# Patient Record
Sex: FEMALE | Race: WHITE | Employment: FULL TIME | URBAN - NONMETROPOLITAN AREA
[De-identification: names, ages, dates, MRNs, and addresses within clinical notes are randomized per-mention and may not be internally consistent; named-entity substitution may affect disease eponyms.]

---

## 2017-12-29 ENCOUNTER — HOSPITAL ENCOUNTER (EMERGENCY)
Age: 52
Discharge: HOME | End: 2017-12-29
Attending: FAMILY MEDICINE
Payer: COMMERCIAL

## 2017-12-29 VITALS
OXYGEN SATURATION: 98 % | DIASTOLIC BLOOD PRESSURE: 71 MMHG | SYSTOLIC BLOOD PRESSURE: 124 MMHG | HEART RATE: 81 BPM | RESPIRATION RATE: 16 BRPM | TEMPERATURE: 98.1 F

## 2017-12-29 DIAGNOSIS — S16.1XXA CERVICAL STRAIN, ACUTE, INITIAL ENCOUNTER: ICD-10-CM

## 2017-12-29 DIAGNOSIS — G43.009 MIGRAINE WITHOUT AURA AND WITHOUT STATUS MIGRAINOSUS, NOT INTRACTABLE: Primary | ICD-10-CM

## 2017-12-29 PROCEDURE — 2580000003 HC RX 258: Performed by: FAMILY MEDICINE

## 2017-12-29 PROCEDURE — 6360000002 HC RX W HCPCS: Performed by: FAMILY MEDICINE

## 2017-12-29 PROCEDURE — 96374 THER/PROPH/DIAG INJ IV PUSH: CPT

## 2017-12-29 PROCEDURE — 96375 TX/PRO/DX INJ NEW DRUG ADDON: CPT

## 2017-12-29 PROCEDURE — 96372 THER/PROPH/DIAG INJ SC/IM: CPT

## 2017-12-29 PROCEDURE — 99283 EMERGENCY DEPT VISIT LOW MDM: CPT

## 2017-12-29 RX ORDER — DIPHENHYDRAMINE HYDROCHLORIDE 50 MG/ML
25 INJECTION INTRAMUSCULAR; INTRAVENOUS ONCE
Status: COMPLETED | OUTPATIENT
Start: 2017-12-29 | End: 2017-12-29

## 2017-12-29 RX ORDER — METOCLOPRAMIDE HYDROCHLORIDE 5 MG/ML
5 INJECTION INTRAMUSCULAR; INTRAVENOUS ONCE
Status: COMPLETED | OUTPATIENT
Start: 2017-12-29 | End: 2017-12-29

## 2017-12-29 RX ORDER — HYDROCODONE BITARTRATE AND ACETAMINOPHEN 7.5; 325 MG/1; MG/1
1 TABLET ORAL EVERY 6 HOURS PRN
Qty: 8 TABLET | Refills: 0 | Status: SHIPPED | OUTPATIENT
Start: 2017-12-29 | End: 2018-01-05

## 2017-12-29 RX ORDER — METOCLOPRAMIDE HYDROCHLORIDE 5 MG/ML
10 INJECTION INTRAMUSCULAR; INTRAVENOUS ONCE
Status: DISCONTINUED | OUTPATIENT
Start: 2017-12-29 | End: 2017-12-29

## 2017-12-29 RX ORDER — 0.9 % SODIUM CHLORIDE 0.9 %
500 INTRAVENOUS SOLUTION INTRAVENOUS ONCE
Status: COMPLETED | OUTPATIENT
Start: 2017-12-29 | End: 2017-12-29

## 2017-12-29 RX ADMIN — DIPHENHYDRAMINE HYDROCHLORIDE 25 MG: 50 INJECTION, SOLUTION INTRAMUSCULAR; INTRAVENOUS at 12:29

## 2017-12-29 RX ADMIN — METOCLOPRAMIDE 5 MG: 5 INJECTION, SOLUTION INTRAMUSCULAR; INTRAVENOUS at 12:29

## 2017-12-29 RX ADMIN — HYDROMORPHONE HYDROCHLORIDE 1 MG: 1 INJECTION, SOLUTION INTRAMUSCULAR; INTRAVENOUS; SUBCUTANEOUS at 12:28

## 2017-12-29 RX ADMIN — SODIUM CHLORIDE 500 ML: 9 INJECTION, SOLUTION INTRAVENOUS at 12:34

## 2017-12-29 ASSESSMENT — ENCOUNTER SYMPTOMS
EYE DISCHARGE: 0
VOICE CHANGE: 0
DIARRHEA: 0
FACIAL SWELLING: 0
CHEST TIGHTNESS: 0
PHOTOPHOBIA: 0
EYE PAIN: 0
CONSTIPATION: 0
ABDOMINAL PAIN: 0
BACK PAIN: 0
VOMITING: 0
RHINORRHEA: 0
STRIDOR: 0
COUGH: 0
COLOR CHANGE: 0
EYE REDNESS: 0
WHEEZING: 0
SORE THROAT: 0
SHORTNESS OF BREATH: 0
NAUSEA: 0
ABDOMINAL DISTENTION: 0

## 2017-12-29 ASSESSMENT — PAIN SCALES - GENERAL
PAINLEVEL_OUTOF10: 0
PAINLEVEL_OUTOF10: 10

## 2017-12-29 ASSESSMENT — PAIN DESCRIPTION - ORIENTATION: ORIENTATION: POSTERIOR

## 2017-12-29 ASSESSMENT — PAIN DESCRIPTION - LOCATION: LOCATION: HEAD

## 2017-12-29 ASSESSMENT — PAIN DESCRIPTION - PAIN TYPE: TYPE: ACUTE PAIN

## 2017-12-29 NOTE — ED PROVIDER NOTES
not nervous/anxious. All other systems reviewed and are negative. PAST MEDICAL HISTORY    has a past medical history of Headache(784.0). SURGICAL HISTORY      has a past surgical history that includes  section; Endometrial ablation; and Carpal tunnel release (Right). CURRENT MEDICATIONS       Previous Medications    DIPHENHYDRAMINE (BENADRYL) 25 MG CAPSULE    Take 25 mg by mouth every 6 hours as needed for Itching    ESCITALOPRAM OXALATE (LEXAPRO PO)    Take by mouth    NAPROXEN (NAPROSYN) 500 MG TABLET    Take 1 tablet by mouth 2 times daily    SUMATRIPTAN (IMITREX) 5 MG/ACT NASAL SPRAY    1 spray by Nasal route daily as needed for Migraine       ALLERGIES     has No Known Allergies. FAMILY HISTORY     has no family status information on file. family history is not on file. SOCIAL HISTORY      reports that she has quit smoking. She does not have any smokeless tobacco history on file. She reports that she drinks alcohol. She reports that she does not use drugs. PHYSICAL EXAM     INITIAL VITALS:  oral temperature is 98.1 °F (36.7 °C). Her blood pressure is 124/71 and her pulse is 81. Her respiration is 16 and oxygen saturation is 98%. Physical Exam   Constitutional: She is oriented to person, place, and time. She appears well-developed and well-nourished. No distress. HENT:   Head: Normocephalic and atraumatic. Right Ear: External ear normal.   Left Ear: External ear normal.   Nose: Nose normal.   Mouth/Throat: Oropharynx is clear and moist.   Eyes: Conjunctivae and EOM are normal. Pupils are equal, round, and reactive to light. Right eye exhibits no discharge. Left eye exhibits no discharge. No scleral icterus. Neck: Normal range of motion. Neck supple. No JVD present. No tracheal deviation present. No thyromegaly present. Cardiovascular: Normal rate, regular rhythm, normal heart sounds and intact distal pulses. Exam reveals no gallop and no friction rub.     No murmur heard.  Pulmonary/Chest: Effort normal and breath sounds normal. No stridor. No respiratory distress. She has no wheezes. She has no rales. She exhibits no tenderness. Musculoskeletal: Normal range of motion. She exhibits no edema or tenderness. Lymphadenopathy:     She has no cervical adenopathy. Neurological: She is alert and oriented to person, place, and time. She has normal reflexes. No cranial nerve deficit. She exhibits normal muscle tone. Coordination normal.   No focal deficits. Skin: Skin is warm and dry. No rash (on exposed skin) noted. She is not diaphoretic. Psychiatric: She has a normal mood and affect. Her behavior is normal.   Nursing note and vitals reviewed. DIFFERENTIAL DIAGNOSIS:   Migraine, trapezius strain,    DIAGNOSTIC RESULTS     EKG: All EKG's are interpreted by the Emergency Department Physician who either signs or Co-signs this chart in the absence of a cardiologist.      RADIOLOGY: non-plain film images(s) such as CT, Ultrasound and MRI are read by the radiologist.  Plain radiographic images are visualized and preliminarily interpreted by the emergency physician unless otherwise stated below. LABS:   Labs Reviewed - No data to display    EMERGENCY DEPARTMENT COURSE(MDM): Vitals:    Vitals:    12/29/17 1209 12/29/17 1305   BP: (!) 140/83 124/71   Pulse: 88 81   Resp: 20 16   Temp: 98.1 °F (36.7 °C)    TempSrc: Oral    SpO2: 98% 98%     Patient admits to significant right-sided headache extending to her right trapezius area. Patient admits to using Imitrex prior to arrival with no improvement. Patient does admit to a history of migraines in the past however nothing within the last few months. She admitted to some mild nausea. Neurologic exam was unremarkable. This did not seem to have thunderclap equivalent symptoms. Patient was provided normal saline bolus 500 mL, Reglan 5 mg IV, Benadryl 25 mg IV, and Dilaudid 1 mg.   Patient noted significant improvement in

## 2018-02-28 ENCOUNTER — HOSPITAL ENCOUNTER (EMERGENCY)
Age: 53
Discharge: HOME | End: 2018-02-28
Attending: EMERGENCY MEDICINE
Payer: COMMERCIAL

## 2018-02-28 ENCOUNTER — APPOINTMENT (OUTPATIENT)
Dept: GENERAL RADIOLOGY | Age: 53
End: 2018-02-28
Payer: COMMERCIAL

## 2018-02-28 VITALS
RESPIRATION RATE: 16 BRPM | SYSTOLIC BLOOD PRESSURE: 133 MMHG | WEIGHT: 190 LBS | DIASTOLIC BLOOD PRESSURE: 83 MMHG | BODY MASS INDEX: 33.66 KG/M2 | HEART RATE: 102 BPM | OXYGEN SATURATION: 95 % | TEMPERATURE: 97.8 F | HEIGHT: 63 IN

## 2018-02-28 DIAGNOSIS — S93.601A SPRAIN OF RIGHT FOOT, INITIAL ENCOUNTER: Primary | ICD-10-CM

## 2018-02-28 PROCEDURE — 99283 EMERGENCY DEPT VISIT LOW MDM: CPT

## 2018-02-28 PROCEDURE — 73630 X-RAY EXAM OF FOOT: CPT

## 2018-02-28 PROCEDURE — L3260 AMBULATORY SURGICAL BOOT EAC: HCPCS

## 2018-02-28 ASSESSMENT — PAIN DESCRIPTION - PAIN TYPE: TYPE: ACUTE PAIN

## 2018-02-28 ASSESSMENT — PAIN DESCRIPTION - ORIENTATION: ORIENTATION: RIGHT

## 2018-02-28 ASSESSMENT — PAIN SCALES - GENERAL: PAINLEVEL_OUTOF10: 6

## 2018-02-28 ASSESSMENT — PAIN DESCRIPTION - DESCRIPTORS: DESCRIPTORS: BURNING;NUMBNESS;TINGLING

## 2018-02-28 ASSESSMENT — PAIN DESCRIPTION - LOCATION: LOCATION: FOOT

## 2018-02-28 NOTE — ED PROVIDER NOTES
[facility]  eMERGENCY dEPARTMENT eNCOUnter      [address] [facility]    Chief Complaint   Patient presents with    Foot Pain       HPI    Nicol Schmidt is a 50 y.o. female who presents  Above-noted complaint. Patient complains of pain and discomfort. She was walking in slippers this morning and felt pain in the bottom of the foot. She feels like her toes a little tingling now after that. She points to the area of the distal metatarsals. Denies weakness some significant tingling foreign body sensation other problems    PAST MEDICAL HISTORY    Past Medical History:   Diagnosis Date    Headache(784.0)        SURGICAL HISTORY    Past Surgical History:   Procedure Laterality Date    CARPAL TUNNEL RELEASE Right      SECTION      ENDOMETRIAL ABLATION         CURRENT MEDICATIONS    Current Outpatient Rx   Medication Sig Dispense Refill    diphenhydrAMINE (BENADRYL) 25 MG capsule Take 25 mg by mouth every 6 hours as needed for Itching      SUMAtriptan (IMITREX) 5 MG/ACT nasal spray 1 spray by Nasal route daily as needed for Migraine      Escitalopram Oxalate (LEXAPRO PO) Take by mouth         ALLERGIES    No Known Allergies    FAMILY HISTORY    History reviewed. No pertinent family history. SOCIAL HISTORY    Social History     Social History    Marital status:      Spouse name: N/A    Number of children: N/A    Years of education: N/A     Social History Main Topics    Smoking status: Current Every Day Smoker     Packs/day: 0.25    Smokeless tobacco: Never Used    Alcohol use Yes      Comment: on occasion    Drug use: No    Sexual activity: Not Asked     Other Topics Concern    None     Social History Narrative    None       REVIEW OF SYSTEMS    Positive for foot pain; no discharge redness drainage. No weakness  All systems negative except as marked.      PHYSICAL EXAM    VITAL SIGNS: /83   Pulse 102   Temp 97.8 °F (36.6 °C)   Resp 16   Ht 5' 3\"

## 2018-02-28 NOTE — ED TRIAGE NOTES
Patient presents to the ED via private auto with complaints of right foot pain. States she was walking up the steps this morning and felt a \"stinging\" pain in the base of her foot just below her toes. Denies trauma to the area.

## 2018-07-06 ENCOUNTER — HOSPITAL ENCOUNTER (EMERGENCY)
Age: 53
Discharge: HOME | End: 2018-07-06
Attending: EMERGENCY MEDICINE
Payer: COMMERCIAL

## 2018-07-06 VITALS
HEART RATE: 74 BPM | RESPIRATION RATE: 18 BRPM | SYSTOLIC BLOOD PRESSURE: 128 MMHG | OXYGEN SATURATION: 97 % | DIASTOLIC BLOOD PRESSURE: 73 MMHG | TEMPERATURE: 98.1 F

## 2018-07-06 DIAGNOSIS — K08.89 PAIN, DENTAL: Primary | ICD-10-CM

## 2018-07-06 PROCEDURE — 96372 THER/PROPH/DIAG INJ SC/IM: CPT

## 2018-07-06 PROCEDURE — 6360000002 HC RX W HCPCS: Performed by: EMERGENCY MEDICINE

## 2018-07-06 PROCEDURE — 6370000000 HC RX 637 (ALT 250 FOR IP): Performed by: EMERGENCY MEDICINE

## 2018-07-06 PROCEDURE — 99283 EMERGENCY DEPT VISIT LOW MDM: CPT

## 2018-07-06 RX ORDER — METHYLPREDNISOLONE ACETATE 80 MG/ML
80 INJECTION, SUSPENSION INTRA-ARTICULAR; INTRALESIONAL; INTRAMUSCULAR; SOFT TISSUE ONCE
Status: COMPLETED | OUTPATIENT
Start: 2018-07-06 | End: 2018-07-06

## 2018-07-06 RX ORDER — PREDNISONE 20 MG/1
TABLET ORAL
Qty: 12 TABLET | Refills: 0 | Status: SHIPPED | OUTPATIENT
Start: 2018-07-06 | End: 2018-12-14 | Stop reason: ALTCHOICE

## 2018-07-06 RX ORDER — OXYCODONE AND ACETAMINOPHEN 10; 325 MG/1; MG/1
1 TABLET ORAL EVERY 6 HOURS PRN
Qty: 12 TABLET | Refills: 0 | Status: SHIPPED | OUTPATIENT
Start: 2018-07-06 | End: 2018-07-09

## 2018-07-06 RX ORDER — PROMETHAZINE HYDROCHLORIDE 25 MG/ML
25 INJECTION, SOLUTION INTRAMUSCULAR; INTRAVENOUS ONCE
Status: COMPLETED | OUTPATIENT
Start: 2018-07-06 | End: 2018-07-06

## 2018-07-06 RX ORDER — MEPERIDINE HYDROCHLORIDE 50 MG/ML
50 INJECTION INTRAMUSCULAR; INTRAVENOUS; SUBCUTANEOUS ONCE
Status: COMPLETED | OUTPATIENT
Start: 2018-07-06 | End: 2018-07-06

## 2018-07-06 RX ORDER — HYDROCODONE BITARTRATE AND ACETAMINOPHEN 5; 325 MG/1; MG/1
1 TABLET ORAL EVERY 6 HOURS PRN
COMMUNITY
End: 2018-10-28 | Stop reason: RX

## 2018-07-06 RX ADMIN — PROMETHAZINE HYDROCHLORIDE 25 MG: 25 INJECTION INTRAMUSCULAR; INTRAVENOUS at 14:47

## 2018-07-06 RX ADMIN — LIDOCAINE HYDROCHLORIDE 15 ML: 20 SOLUTION ORAL; TOPICAL at 14:39

## 2018-07-06 RX ADMIN — MEPERIDINE HYDROCHLORIDE 50 MG: 50 INJECTION, SOLUTION INTRAMUSCULAR; INTRAVENOUS; SUBCUTANEOUS at 14:47

## 2018-07-06 RX ADMIN — METHYLPREDNISOLONE ACETATE 80 MG: 80 INJECTION, SUSPENSION INTRA-ARTICULAR; INTRALESIONAL; INTRAMUSCULAR; SOFT TISSUE at 15:38

## 2018-07-06 ASSESSMENT — PAIN DESCRIPTION - LOCATION
LOCATION: JAW

## 2018-07-06 ASSESSMENT — PAIN DESCRIPTION - ORIENTATION
ORIENTATION: LEFT;LOWER

## 2018-07-06 ASSESSMENT — PAIN SCALES - GENERAL
PAINLEVEL_OUTOF10: 10
PAINLEVEL_OUTOF10: 2
PAINLEVEL_OUTOF10: 9
PAINLEVEL_OUTOF10: 10

## 2018-07-06 NOTE — ED PROVIDER NOTES
[facility]  eMERGENCY dEPARTMENT eNCOUnter             Brady Peoples 19 COMPLAINT    Chief Complaint   Patient presents with    Dental Pain       Nurses Notes reviewed and I agree except as noted in the HPI. HPI    Angela Espino is a 50 y.o. female who presents with severe left mandibular dental pain after having removal of a cracked tooth yesterday at [facility]. The intense pain started soon after the numbing medication wore off. Her pain is  10/10, aching, constant, not relieved with Ibuprofen and Norco. She is also on Amoxicillin. Part of her left jaw \"still feels numb\", burning and stinging. She is sobbing, anxious, having trouble answering questions due to her pain. The worst pain is in the extraction site, and radiates into the adjacent mandible and submandibular area. She does not think cold application helps, and has not tried warm salt water rinses. REVIEW OF SYSTEMS      Review of Systems   Constitutional: Positive for malaise/fatigue. Negative for fever. HENT: Positive for ear pain (left side). Negative for sore throat. Gastrointestinal: Negative for abdominal pain and nausea. Musculoskeletal: Negative for neck pain. Neurological: Positive for headaches. Negative for focal weakness. Psychiatric/Behavioral: The patient is nervous/anxious. All other systems reviewed and are negative. PAST MEDICAL HISTORY     has a past medical history of Headache(784.0). SURGICAL HISTORY     has a past surgical history that includes  section; Endometrial ablation; and Carpal tunnel release (Right). CURRENT MEDICATIONS    Discharge Medication List as of 2018  3:40 PM      CONTINUE these medications which have NOT CHANGED    Details   HYDROcodone-acetaminophen (NORCO) 5-325 MG per tablet Take 1 tablet by mouth every 6 hours as needed for Pain. Amadou Sampson Historical Med      AMOXICILLIN PO Take 500 mg by mouth Historical Med

## 2018-07-06 NOTE — ED NOTES
Dr. Payton Cooper with patient and spouse discussing plan of care.      Natalio Perez RN  07/06/18 9869

## 2018-07-07 ASSESSMENT — ENCOUNTER SYMPTOMS
ABDOMINAL PAIN: 0
SORE THROAT: 0
NAUSEA: 0

## 2018-10-28 ENCOUNTER — HOSPITAL ENCOUNTER (EMERGENCY)
Age: 53
Discharge: HOME | End: 2018-10-28
Attending: FAMILY MEDICINE
Payer: COMMERCIAL

## 2018-10-28 VITALS
OXYGEN SATURATION: 95 % | HEART RATE: 70 BPM | TEMPERATURE: 96.6 F | HEIGHT: 63 IN | BODY MASS INDEX: 35.44 KG/M2 | WEIGHT: 200 LBS | RESPIRATION RATE: 16 BRPM | SYSTOLIC BLOOD PRESSURE: 141 MMHG | DIASTOLIC BLOOD PRESSURE: 89 MMHG

## 2018-10-28 DIAGNOSIS — G43.001 MIGRAINE WITHOUT AURA AND WITH STATUS MIGRAINOSUS, NOT INTRACTABLE: Primary | ICD-10-CM

## 2018-10-28 LAB
ANION GAP: 13 MEQ/L (ref 8–16)
BASOPHILS # BLD: 0.6 % (ref 0–3)
BUN BLDV-MCNC: 14 MG/DL (ref 7–18)
CHLORIDE BLD-SCNC: 103 MEQ/L (ref 98–107)
CO2: 24 MEQ/L (ref 21–32)
CREAT SERPL-MCNC: 0.7 MG/DL (ref 0.6–1.3)
EOSINOPHILS RELATIVE PERCENT: 4.1 % (ref 0–4)
GFR, ESTIMATED: > 90 ML/MIN/1.73M2
GLUCOSE BLD-MCNC: 115 MG/DL (ref 74–106)
HCT VFR BLD CALC: 45.3 % (ref 37–47)
HEMOGLOBIN: 15.7 GM/DL (ref 12–16)
LYMPHOCYTES # BLD: 36.2 % (ref 15–47)
MCH RBC QN AUTO: 32.5 PG (ref 27–31)
MCHC RBC AUTO-ENTMCNC: 34.7 GM/DL (ref 33–37)
MCV RBC AUTO: 93.5 FL (ref 81–99)
MONOCYTES: 7.1 % (ref 0–12)
PDW BLD-RTO: 13.8 % (ref 11.5–14.5)
PLATELET # BLD: 243 THOU/MM3 (ref 130–400)
PMV BLD AUTO: 8.3 FL (ref 7.4–10.4)
POC CALCIUM: 9.1 MG/DL (ref 8.5–10.1)
POTASSIUM SERPL-SCNC: 4.3 MEQ/L (ref 3.5–5.1)
RBC # BLD: 4.84 MILL/MM3 (ref 4.2–5.4)
SEGS: 52 % (ref 43–75)
SODIUM BLD-SCNC: 140 MEQ/L (ref 136–145)
WBC # BLD: 5.8 THOU/MM3 (ref 4.8–10.8)

## 2018-10-28 PROCEDURE — 96375 TX/PRO/DX INJ NEW DRUG ADDON: CPT

## 2018-10-28 PROCEDURE — 36415 COLL VENOUS BLD VENIPUNCTURE: CPT

## 2018-10-28 PROCEDURE — 2580000003 HC RX 258: Performed by: FAMILY MEDICINE

## 2018-10-28 PROCEDURE — 80048 BASIC METABOLIC PNL TOTAL CA: CPT

## 2018-10-28 PROCEDURE — 99283 EMERGENCY DEPT VISIT LOW MDM: CPT

## 2018-10-28 PROCEDURE — 2709999900 HC NON-CHARGEABLE SUPPLY

## 2018-10-28 PROCEDURE — 6360000002 HC RX W HCPCS: Performed by: FAMILY MEDICINE

## 2018-10-28 PROCEDURE — 96374 THER/PROPH/DIAG INJ IV PUSH: CPT

## 2018-10-28 PROCEDURE — 85025 COMPLETE CBC W/AUTO DIFF WBC: CPT

## 2018-10-28 RX ORDER — HYDROCODONE BITARTRATE AND ACETAMINOPHEN 5; 325 MG/1; MG/1
1 TABLET ORAL EVERY 6 HOURS PRN
Qty: 6 TABLET | Refills: 0 | Status: SHIPPED | OUTPATIENT
Start: 2018-10-28 | End: 2018-10-31

## 2018-10-28 RX ORDER — METOCLOPRAMIDE HYDROCHLORIDE 5 MG/ML
10 INJECTION INTRAMUSCULAR; INTRAVENOUS ONCE
Status: COMPLETED | OUTPATIENT
Start: 2018-10-28 | End: 2018-10-28

## 2018-10-28 RX ORDER — DIPHENHYDRAMINE HYDROCHLORIDE 50 MG/ML
25 INJECTION INTRAMUSCULAR; INTRAVENOUS ONCE
Status: COMPLETED | OUTPATIENT
Start: 2018-10-28 | End: 2018-10-28

## 2018-10-28 RX ORDER — 0.9 % SODIUM CHLORIDE 0.9 %
1000 INTRAVENOUS SOLUTION INTRAVENOUS ONCE
Status: COMPLETED | OUTPATIENT
Start: 2018-10-28 | End: 2018-10-28

## 2018-10-28 RX ADMIN — DIPHENHYDRAMINE HYDROCHLORIDE 25 MG: 50 INJECTION, SOLUTION INTRAMUSCULAR; INTRAVENOUS at 09:37

## 2018-10-28 RX ADMIN — SODIUM CHLORIDE 1000 ML: 9 INJECTION, SOLUTION INTRAVENOUS at 09:35

## 2018-10-28 RX ADMIN — HYDROMORPHONE HYDROCHLORIDE 1 MG: 1 INJECTION, SOLUTION INTRAMUSCULAR; INTRAVENOUS; SUBCUTANEOUS at 09:36

## 2018-10-28 RX ADMIN — METOCLOPRAMIDE 10 MG: 5 INJECTION, SOLUTION INTRAMUSCULAR; INTRAVENOUS at 09:36

## 2018-10-28 ASSESSMENT — ENCOUNTER SYMPTOMS
CONSTIPATION: 0
COUGH: 0
EYE PAIN: 0
STRIDOR: 0
WHEEZING: 0
VOMITING: 0
DIARRHEA: 0
COLOR CHANGE: 0
CHEST TIGHTNESS: 0
RHINORRHEA: 0
ABDOMINAL PAIN: 0
VOICE CHANGE: 0
PHOTOPHOBIA: 0
NAUSEA: 0
BACK PAIN: 0
EYE DISCHARGE: 0
FACIAL SWELLING: 0
ABDOMINAL DISTENTION: 0
SHORTNESS OF BREATH: 0
EYE REDNESS: 0
SORE THROAT: 0

## 2018-10-28 ASSESSMENT — PAIN SCALES - GENERAL
PAINLEVEL_OUTOF10: 0
PAINLEVEL_OUTOF10: 10

## 2018-10-28 NOTE — PROGRESS NOTES
AVS rev'd with pt. And copy given, pt. Given Rx for norco, verbalizes understanding, pt. delines w/c. adivised pt. To go home and rest remainder of day and not to operate or drive heavy machinery, pt. verabalizes understanding. Of poc. Pulse regular. Extremities warm. Respirations regular and quiet. Mucous membranes pink & moist. Alert and oriented times 3. No nausea or vomiting. Range of motion within patient's limits. Skin pink, warm and dry. Calm and cooperative.

## 2018-10-28 NOTE — PROGRESS NOTES
Pt. Presents ambulatory to [facility]  With c/o migraine h/a woke her up around 8:00 am, c/o nausea, pt. Voices unable to sit down, pt. Tearful and anxious, pt. Upset with nurse trying to get medical history, pt. Taken to exam 1 , lights dimmed,emesis bag given.

## 2018-10-28 NOTE — ED PROVIDER NOTES
Endometrial ablation; and Carpal tunnel release (Right). CURRENT MEDICATIONS     Discharge Medication List as of 10/28/2018 10:28 AM      CONTINUE these medications which have NOT CHANGED    Details   AMOXICILLIN PO Take 500 mg by mouth Historical Med      predniSONE (DELTASONE) 20 MG tablet 3 daily for 2 days, then 2 daily for 2 days,then 1 daily for 2 days for inflammation. , Disp-12 tablet, R-0Print      diphenhydrAMINE (BENADRYL) 25 MG capsule Take 25 mg by mouth every 6 hours as needed for ItchingHistorical Med      SUMAtriptan (IMITREX) 5 MG/ACT nasal spray 1 spray by Nasal route daily as needed for Migraine      Escitalopram Oxalate (LEXAPRO PO) Take by mouth             ALLERGIES     has No Known Allergies. FAMILY HISTORY     has no family status information on file. family history is not on file. SOCIAL HISTORY      reports that she has been smoking. She has been smoking about 0.25 packs per day. She has never used smokeless tobacco. She reports that she drinks alcohol. She reports that she does not use drugs. PHYSICAL EXAM     INITIAL VITALS:  height is 5' 3\" (1.6 m) and weight is 200 lb (90.7 kg). Her temporal temperature is 96.6 °F (35.9 °C). Her blood pressure is 141/89 (abnormal) and her pulse is 70. Her respiration is 16 and oxygen saturation is 95%. Physical Exam   Constitutional: She appears well-developed and well-nourished. She appears distressed. HENT:   Head: Normocephalic and atraumatic. Eyes: Pupils are equal, round, and reactive to light. Conjunctivae and EOM are normal.   Neck: Normal range of motion. Neck supple. Skin: Skin is warm and dry. Psychiatric: Her mood appears anxious. She is agitated. Nursing note and vitals reviewed.       DIFFERENTIAL DIAGNOSIS:   Migraine, SAH,    DIAGNOSTIC RESULTS     EKG: All EKG's are interpreted by the Emergency Department Physician who either signs or Co-signs this chart in the absence of a cardiologist.      RADIOLOGY: for Pain for up to 3 days. Intended supply: 3 days.  Take lowest dose possible to manage pain., Disp-6 tablet, R-0Print             (Please note that portionsof this note were completed with a voice recognition program.  Efforts were made to edit the dictations but occasionally words are mis-transcribed.)    MD Khanh Bojorquez MD  10/28/18 7179

## 2018-12-14 ENCOUNTER — HOSPITAL ENCOUNTER (EMERGENCY)
Age: 53
Discharge: HOME | End: 2018-12-14
Attending: EMERGENCY MEDICINE
Payer: COMMERCIAL

## 2018-12-14 VITALS
OXYGEN SATURATION: 94 % | HEART RATE: 82 BPM | RESPIRATION RATE: 16 BRPM | TEMPERATURE: 98.8 F | BODY MASS INDEX: 31.89 KG/M2 | SYSTOLIC BLOOD PRESSURE: 124 MMHG | WEIGHT: 180 LBS | DIASTOLIC BLOOD PRESSURE: 59 MMHG

## 2018-12-14 DIAGNOSIS — J30.2 SEASONAL ALLERGIES: ICD-10-CM

## 2018-12-14 DIAGNOSIS — J06.9 ACUTE UPPER RESPIRATORY INFECTION: Primary | ICD-10-CM

## 2018-12-14 PROCEDURE — 99283 EMERGENCY DEPT VISIT LOW MDM: CPT

## 2018-12-14 PROCEDURE — 6360000002 HC RX W HCPCS: Performed by: EMERGENCY MEDICINE

## 2018-12-14 PROCEDURE — 96372 THER/PROPH/DIAG INJ SC/IM: CPT

## 2018-12-14 RX ORDER — PREDNISONE 10 MG/1
TABLET ORAL
Qty: 30 TABLET | Refills: 0 | Status: SHIPPED | OUTPATIENT
Start: 2018-12-14 | End: 2019-03-26 | Stop reason: ALTCHOICE

## 2018-12-14 RX ORDER — METHYLPREDNISOLONE SODIUM SUCCINATE 125 MG/2ML
80 INJECTION, POWDER, LYOPHILIZED, FOR SOLUTION INTRAMUSCULAR; INTRAVENOUS ONCE
Status: COMPLETED | OUTPATIENT
Start: 2018-12-14 | End: 2018-12-14

## 2018-12-14 RX ADMIN — METHYLPREDNISOLONE SODIUM SUCCINATE 80 MG: 125 INJECTION, POWDER, FOR SOLUTION INTRAMUSCULAR; INTRAVENOUS at 06:33

## 2018-12-14 NOTE — ED PROVIDER NOTES
activity: Not Asked     Other Topics Concern    None     Social History Narrative    None       REVIEW OF SYSTEMS    Positive for rhinorrhea and stuffiness. Negative for neck pain chest pain terms of breath focal weakness or vomiting. All systems negative except as marked. PHYSICAL EXAM    VITAL SIGNS: BP (!) 124/59   Pulse 82   Temp 98.8 °F (37.1 °C) (Temporal)   Resp 16   Wt 180 lb (81.6 kg)   SpO2 94%   BMI 31.89 kg/m²    Constitutional:  Alert not toxtic or ill, able to give coherent history  HENT:  Normocephalic, Atraumatic, TMs are normal, oropharynx normal.  Some maxillary sinus tenderness. Cervical Spine: Normal range of motion,  No stridor. Eyes:  No discharge or  Swelling,perrla  Respiratory: No respiratory distress, clear to auscultation  Musculoskeletal:  Intact distal pulses, No edema, No tenderness, No cyanosis, No clubbing. Good range of motion in all major joints. No tenderness to palpation or major deformities noted. Integument:  Warm, Dry, No erythema, No rash (on exposed areas)   Neurologic:  Alert & appropriate, moving all fours   Psychiatric:  Affect normal                    RADIOLOGY    No orders to display       PROCEDURES    none      CONSULTS:  None        ED COURSE & MEDICAL DECISION MAKING    Pertinent Labs & Imaging studies reviewed. (See chart for details)  She presents with URI and sinusitis type symptoms. Neurological exam is otherwise stable. Nothing else to suggest stroke mass or bleeding or other issues. X-rays allergic component. Give her dose and some Solu-Medrol Place her on some prednisone taper. She is artery on appropriate other medicines. Continue Flonase.       SCREENINGS  BP (!) 124/59   Pulse 82   Temp 98.8 °F (37.1 °C) (Temporal)   Resp 16   Wt 180 lb (81.6 kg)   SpO2 94%   BMI 31.89 kg/m²      No orders to display       Screening For Hypertension and Follow-up (#317)  patient informed that blood pressure is normal but should always be

## 2019-03-26 ENCOUNTER — HOSPITAL ENCOUNTER (EMERGENCY)
Age: 54
Discharge: HOME | End: 2019-03-26
Attending: EMERGENCY MEDICINE
Payer: COMMERCIAL

## 2019-03-26 VITALS
HEART RATE: 75 BPM | BODY MASS INDEX: 31.89 KG/M2 | HEIGHT: 63 IN | RESPIRATION RATE: 16 BRPM | SYSTOLIC BLOOD PRESSURE: 123 MMHG | DIASTOLIC BLOOD PRESSURE: 70 MMHG | WEIGHT: 180 LBS | OXYGEN SATURATION: 96 % | TEMPERATURE: 98.7 F

## 2019-03-26 DIAGNOSIS — H66.91 ACUTE RIGHT OTITIS MEDIA: Primary | ICD-10-CM

## 2019-03-26 PROCEDURE — 6370000000 HC RX 637 (ALT 250 FOR IP): Performed by: EMERGENCY MEDICINE

## 2019-03-26 PROCEDURE — 99283 EMERGENCY DEPT VISIT LOW MDM: CPT

## 2019-03-26 RX ORDER — AMOXICILLIN 500 MG/1
1000 CAPSULE ORAL 2 TIMES DAILY
Qty: 40 CAPSULE | Refills: 0 | Status: SHIPPED | OUTPATIENT
Start: 2019-03-26 | End: 2019-04-05

## 2019-03-26 RX ORDER — LISINOPRIL 10 MG/1
10 TABLET ORAL DAILY
COMMUNITY
End: 2019-12-26

## 2019-03-26 RX ORDER — ONDANSETRON 4 MG/1
4 TABLET, ORALLY DISINTEGRATING ORAL EVERY 8 HOURS PRN
Qty: 6 TABLET | Refills: 0 | Status: SHIPPED | OUTPATIENT
Start: 2019-03-26 | End: 2019-12-26

## 2019-03-26 RX ORDER — IBUPROFEN 200 MG
600 TABLET ORAL ONCE
Status: COMPLETED | OUTPATIENT
Start: 2019-03-26 | End: 2019-03-26

## 2019-03-26 RX ORDER — HYDROCODONE BITARTRATE AND ACETAMINOPHEN 5; 325 MG/1; MG/1
1 TABLET ORAL ONCE
Status: COMPLETED | OUTPATIENT
Start: 2019-03-26 | End: 2019-03-26

## 2019-03-26 RX ORDER — HYDROCODONE BITARTRATE AND ACETAMINOPHEN 5; 325 MG/1; MG/1
1 TABLET ORAL EVERY 6 HOURS PRN
Qty: 12 TABLET | Refills: 0 | Status: SHIPPED | OUTPATIENT
Start: 2019-03-26 | End: 2019-03-29

## 2019-03-26 RX ORDER — ONDANSETRON 4 MG/1
4 TABLET, ORALLY DISINTEGRATING ORAL ONCE
Status: COMPLETED | OUTPATIENT
Start: 2019-03-26 | End: 2019-03-26

## 2019-03-26 RX ORDER — AMOXICILLIN 250 MG/1
250 CAPSULE ORAL ONCE
Status: COMPLETED | OUTPATIENT
Start: 2019-03-26 | End: 2019-03-26

## 2019-03-26 RX ADMIN — HYDROCODONE BITARTRATE AND ACETAMINOPHEN 1 TABLET: 5; 325 TABLET ORAL at 04:00

## 2019-03-26 RX ADMIN — AMOXICILLIN 250 MG: 250 CAPSULE ORAL at 17:58

## 2019-03-26 RX ADMIN — IBUPROFEN 600 MG: 200 TABLET, FILM COATED ORAL at 17:59

## 2019-03-26 RX ADMIN — ONDANSETRON 4 MG: 4 TABLET, ORALLY DISINTEGRATING ORAL at 17:33

## 2019-03-26 ASSESSMENT — PAIN SCALES - GENERAL
PAINLEVEL_OUTOF10: 4
PAINLEVEL_OUTOF10: 7

## 2019-03-26 ASSESSMENT — ENCOUNTER SYMPTOMS
SHORTNESS OF BREATH: 0
COUGH: 1
SORE THROAT: 1
NAUSEA: 0
SINUS PRESSURE: 1
ABDOMINAL PAIN: 0
WHEEZING: 0
RHINORRHEA: 1

## 2019-03-26 ASSESSMENT — PAIN DESCRIPTION - LOCATION: LOCATION: EAR

## 2019-03-26 ASSESSMENT — PAIN DESCRIPTION - ORIENTATION: ORIENTATION: RIGHT

## 2019-05-06 ENCOUNTER — HOSPITAL ENCOUNTER (OUTPATIENT)
Dept: GENERAL RADIOLOGY | Age: 54
Discharge: HOME | End: 2019-05-06
Payer: COMMERCIAL

## 2019-05-06 ENCOUNTER — HOSPITAL ENCOUNTER (OUTPATIENT)
Age: 54
Discharge: HOME | End: 2019-05-06
Payer: COMMERCIAL

## 2019-05-06 DIAGNOSIS — R52 PAIN: ICD-10-CM

## 2019-05-06 PROCEDURE — 73630 X-RAY EXAM OF FOOT: CPT

## 2019-05-06 PROCEDURE — 73610 X-RAY EXAM OF ANKLE: CPT

## 2019-05-16 ENCOUNTER — HOSPITAL ENCOUNTER (OUTPATIENT)
Dept: MRI IMAGING | Age: 54
Discharge: HOME | End: 2019-05-16
Payer: COMMERCIAL

## 2019-05-16 DIAGNOSIS — M25.571 RIGHT ANKLE PAIN, UNSPECIFIED CHRONICITY: ICD-10-CM

## 2019-05-16 PROCEDURE — 73721 MRI JNT OF LWR EXTRE W/O DYE: CPT

## 2019-05-24 ENCOUNTER — HOSPITAL ENCOUNTER (EMERGENCY)
Age: 54
Discharge: HOME | End: 2019-05-24
Attending: EMERGENCY MEDICINE
Payer: COMMERCIAL

## 2019-05-24 VITALS
TEMPERATURE: 98.7 F | OXYGEN SATURATION: 97 % | HEART RATE: 73 BPM | DIASTOLIC BLOOD PRESSURE: 90 MMHG | RESPIRATION RATE: 18 BRPM | SYSTOLIC BLOOD PRESSURE: 148 MMHG

## 2019-05-24 DIAGNOSIS — M72.2 PLANTAR FASCIITIS, RIGHT: ICD-10-CM

## 2019-05-24 DIAGNOSIS — M25.571 ACUTE RIGHT ANKLE PAIN: Primary | ICD-10-CM

## 2019-05-24 PROCEDURE — 99283 EMERGENCY DEPT VISIT LOW MDM: CPT

## 2019-05-24 PROCEDURE — 2709999900 HC NON-CHARGEABLE SUPPLY

## 2019-05-24 ASSESSMENT — ENCOUNTER SYMPTOMS: SHORTNESS OF BREATH: 0

## 2019-05-24 ASSESSMENT — PAIN SCALES - GENERAL
PAINLEVEL_OUTOF10: 4
PAINLEVEL_OUTOF10: 7

## 2019-05-24 ASSESSMENT — PAIN DESCRIPTION - LOCATION
LOCATION: ANKLE
LOCATION: ANKLE;FOOT

## 2019-05-24 ASSESSMENT — PAIN DESCRIPTION - ORIENTATION
ORIENTATION: RIGHT;OUTER
ORIENTATION: LEFT

## 2019-05-24 NOTE — ED NOTES
Discharge instructions reviewed with patient and spouse,  questions answered. Skin warm and dry, color normal for ethnicity. Remains alert and cooperative. Denies further questions or concerns at this time.      Ella Solomon RN  05/24/19 9112

## 2019-05-24 NOTE — DISCHARGE INSTR - COC
Continuity of Care Form    Patient Name: Justina Murillo   :  1965  MRN:  [id number]    Admit date:  2019  Discharge date:  ***    Code Status Order: No Order   Advance Directives:     Admitting Physician:  No admitting provider for patient encounter. PCP: Jacek Gates MD    Discharging Nurse: [facility] Unit/Room#: E5/E5  Discharging Unit Phone Number: ***    Emergency Contact:   Extended Emergency Contact Information  Primary Emergency Contact: Lion Pino [address] [place] [address] Phone: [phone]  Relation: Other  Secondary Emergency Contact: Ambikakt Lindy  Address: [place] [address].           Atul Mondragon [address] [place] [address] Phone: [phone]  Relation: Spouse    Past Surgical History:  Past Surgical History:   Procedure Laterality Date    CARPAL TUNNEL RELEASE Right      SECTION      ENDOMETRIAL ABLATION         Immunization History:   Immunization History   Administered Date(s) Administered    Tdap (Boostrix, Adacel) 2013       Active Problems: There is no problem list on file for this patient.       Isolation/Infection:   Isolation          No Isolation            Nurse Assessment:  Last Vital Signs: BP (!) 148/90   Pulse 73   Temp 98.7 °F (37.1 °C) (Oral)   Resp 18   SpO2 97%     Last documented pain score (0-10 scale): Pain Level: 4  Last Weight:   Wt Readings from Last 1 Encounters:   19 180 lb (81.6 kg)     Mental Status:  {IP PT MENTAL STATUS:43871}    IV Access:  { PRUDENCE IV ACCESS:517739307}    Nursing Mobility/ADLs:  Walking   {CHP DME IBDM:343272534}  Transfer  {CHP DME ZVXU:691571732}  Bathing  {CHP DME FJFF:353964293}  Dressing  {CHP DME JDLN:140413462}  Toileting  {CHP DME VPZV:832591502}  Feeding  {CHP DME OTDO:139767152}  Med Admin  {CHP DME IRHA:684867856}  Med Delivery   { PRUDENCE MED Delivery:975685855}    Wound Care Documentation and Therapy: Elimination:  Continence:   · Bowel: {YES / DV:74324}  · Bladder: {YES / SQ:72144}  Urinary Catheter: {Urinary Catheter:155216999}   Colostomy/Ileostomy/Ileal Conduit: {YES / JL:04911}       Date of Last BM: ***  No intake or output data in the 24 hours ending 19 1031  No intake/output data recorded.     Safety Concerns:     [facility] Safety Concerns:272133495}    Impairments/Disabilities:      [facility] Impairments/Disabilities:156054716}    Nutrition Therapy:  Current Nutrition Therapy:   [facility] Diet List:499089472}    Routes of Feeding: {CHP DME Other Feedings:840647874}  Liquids: {Slp liquid thickness:88944}  Daily Fluid Restriction: {CHP DME Yes amt example:352348150}  Last Modified Barium Swallow with Video (Video Swallowing Test): {Done Not Done EWSN:941540433}    Treatments at the Time of Hospital Discharge:   Respiratory Treatments: ***  Oxygen Therapy:  {Therapy; copd oxygen:87164}  Ventilator:    {[facility] Vent KVUL:353902634}    Rehab Therapies: {THERAPEUTIC INTERVENTION:9748089081}  Weight Bearing Status/Restrictions: [address] [place] [address] Weight Bearin}  Other Medical Equipment (for information only, NOT a DME order):  {EQUIPMENT:830107387}  Other Treatments: ***    Patient's personal belongings (please select all that are sent with patient):  {[facility] DME Belongings:562403431}    RN SIGNATURE:  {Esignature:378538963}    CASE MANAGEMENT/SOCIAL WORK SECTION    Inpatient Status Date: ***    Readmission Risk Assessment Score:  Readmission Risk              Risk of Unplanned Readmission:        0           Discharging to Facility/ Agency   · Name:   · Address:  · Phone:  · Fax:    Dialysis Facility (if applicable)   · Name:  · Address:  · Dialysis Schedule:  · Phone:  · Fax:    / signature: {Esignature:329126363}    PHYSICIAN SECTION    Prognosis: {Prognosis:2109126099}    Condition at Discharge: [facility] Patient Condition:010605672}    Rehab Potential (if transferring to Rehab):

## 2019-05-24 NOTE — ED PROVIDER NOTES
Jola Goltz   [address]  Phone: [phone]    eMERGENCY dEPARTMENT eNCOUnter           [address] [facility]       Chief Complaint   Patient presents with    Ankle Pain     right       Nurses Notes reviewed and I agree except as noted in the HPI. HISTORY OF PRESENT ILLNESS    Grace Pizarro is a 48 y.o. female who presented via private vehicle with the  chief complaint mentioned above. She presented with one-month history of right foot and ankle pain. She denies injury or fall. She said that her right foot developed on her today at work. He did not fall. She describes her pain as mild sharp pain involving the anterior aspect of the right and conjoint and the plantar aspect of the right foot. Pain is worse with walking. She denies weakness or numbness. She denies fever or chills. She had MRI of the right foot 8 days ago, which showed the bone contusion and    REVIEW OF SYSTEMS     Review of Systems   Constitutional: Negative for fever. Respiratory: Negative for shortness of breath. Cardiovascular: Negative for chest pain. PAST MEDICAL HISTORY    has a past medical history of Headache(784.0). SURGICAL HISTORY      has a past surgical history that includes  section; Endometrial ablation; and Carpal tunnel release (Right).     CURRENT MEDICATIONS       Previous Medications    ASPIRIN 500 MG TABLET    Take 1,000 mg by mouth every 6 hours as needed for Pain caffeine also in product    DIPHENHYDRAMINE (BENADRYL) 25 MG CAPSULE    Take 25 mg by mouth every 6 hours as needed for Itching    ESCITALOPRAM OXALATE (LEXAPRO PO)    Take by mouth    IBUPROFEN PO    Take by mouth    LISINOPRIL (ZESTRIL) 10 MG TABLET    Take 10 mg by mouth daily    ONDANSETRON (ZOFRAN ODT) 4 MG DISINTEGRATING TABLET    Take 1 tablet by mouth every 8 hours as needed for Nausea or Vomiting    SUMATRIPTAN (IMITREX) 5 MG/ACT NASAL SPRAY    1 spray by Nasal route daily as needed for Migraine       ALLERGIES     has No Known Allergies. FAMILY HISTORY     has no family status information on file. family history is not on file. SOCIAL HISTORY      reports that she has been smoking. She has been smoking about 0.25 packs per day. She has never used smokeless tobacco. She reports that she drinks alcohol. She reports that she does not use drugs. PHYSICAL EXAM     INITIAL VITALS:  oral temperature is 98.7 °F (37.1 °C). Her blood pressure is 148/90 (abnormal) and her pulse is 73. Her respiration is 18 and oxygen saturation is 97%. Physical Exam   Constitutional: She appears well-developed and well-nourished. No distress. HENT:   Head: Atraumatic. Musculoskeletal:   Examination of the right foot and ankle showed no swelling or obvious injury. Temperature is normal to touch. There is no joint effusion. Ligaments are stable. Active and ge of motion didn't did not induce pain. There is tenderness to palpation of the proximal plantar area. Is normal.  Neurovascular examination of the right foot and ankle. Neurological: She is alert. DIAGNOSTIC RESULTS       LABS:   Labs Reviewed - No data to display    EMERGENCY DEPARTMENT COURSE:   Vitals:    Vitals:    05/24/19 0948   BP: (!) 148/90   Pulse: 73   Resp: 18   Temp: 98.7 °F (37.1 °C)   TempSrc: Oral   SpO2: 97%         FINAL IMPRESSION      1. Acute right ankle pain    2. Plantar fasciitis, right          DISPOSITION/PLAN   Discharge home in stable condition, she was given discharge instructions and was advised to return of force or new symptoms.     PATIENT REFERRED TO:  Mariaa Oliveros DPM  [address] [phone]    In 2 days        DISCHARGE MEDICATIONS:  New Prescriptions    No medications on file       (Please note that portions of this note were completed with a voice recognition program.  Efforts were made to edit the dictations but occasionally words are mis-transcribed.)    MD Noemi Bella MD  05/24/19 7812

## 2019-05-24 NOTE — ED NOTES
Flare up of right ankle pain that she has had for several months. No new injury noted. Has had xrays and MRI completed. Pain worse with walking. Tried to work today but pain worsened today. Neuro/circ status intact. Limited range of motion noted due to pain.      Lakhwinder Aguillon RN  05/24/19 4309

## 2019-05-29 ENCOUNTER — HOSPITAL ENCOUNTER (OUTPATIENT)
Age: 54
Discharge: HOME | End: 2019-05-29
Payer: COMMERCIAL

## 2019-05-29 LAB
ERYTHROCYTE [DISTWIDTH] IN BLOOD BY AUTOMATED COUNT: 13.5 % (ref 11.5–14.5)
ERYTHROCYTE [DISTWIDTH] IN BLOOD BY AUTOMATED COUNT: 46.3 FL (ref 35–45)
HCT VFR BLD CALC: 45.3 % (ref 37–47)
HEMOGLOBIN: 15 GM/DL (ref 12–16)
MCH RBC QN AUTO: 30.9 PG (ref 26–33)
MCHC RBC AUTO-ENTMCNC: 33.1 GM/DL (ref 32.2–35.5)
MCV RBC AUTO: 93.4 FL (ref 81–99)
PLATELET # BLD: 290 THOU/MM3 (ref 130–400)
PMV BLD AUTO: 11.4 FL (ref 9.4–12.4)
RBC # BLD: 4.85 MILL/MM3 (ref 4.2–5.4)
RHEUMATOID FACTOR: < 10 IU/ML (ref 0–13)
SEDIMENTATION RATE, ERYTHROCYTE: 4 MM/HR (ref 0–20)
TSH SERPL DL<=0.05 MIU/L-ACNC: 1.16 UIU/ML (ref 0.4–4.2)
URIC ACID: 3.6 MG/DL (ref 2.4–5.7)
VITAMIN D 25-HYDROXY: 27 NG/ML (ref 30–100)
WBC # BLD: 9.3 THOU/MM3 (ref 4.8–10.8)

## 2019-05-29 PROCEDURE — 84443 ASSAY THYROID STIM HORMONE: CPT

## 2019-05-29 PROCEDURE — 84550 ASSAY OF BLOOD/URIC ACID: CPT

## 2019-05-29 PROCEDURE — 36415 COLL VENOUS BLD VENIPUNCTURE: CPT

## 2019-05-29 PROCEDURE — 85027 COMPLETE CBC AUTOMATED: CPT

## 2019-05-29 PROCEDURE — 86038 ANTINUCLEAR ANTIBODIES: CPT

## 2019-05-29 PROCEDURE — 86431 RHEUMATOID FACTOR QUANT: CPT

## 2019-05-29 PROCEDURE — 86200 CCP ANTIBODY: CPT

## 2019-05-29 PROCEDURE — 85651 RBC SED RATE NONAUTOMATED: CPT

## 2019-05-29 PROCEDURE — 86039 ANTINUCLEAR ANTIBODIES (ANA): CPT

## 2019-05-29 PROCEDURE — 82306 VITAMIN D 25 HYDROXY: CPT

## 2019-06-01 LAB
ANA SCREEN: DETECTED
CYCLIC CITRULLIN PEPTIDE AB: 2 UNITS (ref 0–19)

## 2019-06-02 LAB
ANA PATTERN 2: ABNORMAL
ANA PATTERN: ABNORMAL
ANA TITER 2: ABNORMAL
ANA TITER: ABNORMAL
ANTINUCLEAR AB INTERPRETIVE COMMENT: ABNORMAL
ANTINUCLEAR ANTIBODY, HEP-2, IGG: DETECTED
CYTOPLASMIC PATTERN TITER: ABNORMAL

## 2019-07-22 ENCOUNTER — HOSPITAL ENCOUNTER (EMERGENCY)
Age: 54
Discharge: HOME | End: 2019-07-22
Attending: EMERGENCY MEDICINE
Payer: COMMERCIAL

## 2019-07-22 ENCOUNTER — APPOINTMENT (OUTPATIENT)
Dept: CT IMAGING | Age: 54
End: 2019-07-22
Payer: COMMERCIAL

## 2019-07-22 VITALS
SYSTOLIC BLOOD PRESSURE: 119 MMHG | HEART RATE: 77 BPM | DIASTOLIC BLOOD PRESSURE: 70 MMHG | RESPIRATION RATE: 16 BRPM | OXYGEN SATURATION: 95 % | BODY MASS INDEX: 35.44 KG/M2 | WEIGHT: 200 LBS | TEMPERATURE: 98.2 F | HEIGHT: 63 IN

## 2019-07-22 DIAGNOSIS — G43.009 MIGRAINE WITHOUT AURA AND WITHOUT STATUS MIGRAINOSUS, NOT INTRACTABLE: Primary | ICD-10-CM

## 2019-07-22 LAB
ALBUMIN SERPL-MCNC: 4 GM/DL (ref 3.4–5)
ALP BLD-CCNC: 59 U/L (ref 46–116)
ALT SERPL-CCNC: 30 U/L (ref 14–63)
ANION GAP: 10 MEQ/L (ref 8–16)
AST SERPL-CCNC: 14 U/L (ref 15–37)
BASOPHILS # BLD: 0.4 % (ref 0–3)
BILIRUB SERPL-MCNC: 0.4 MG/DL (ref 0.2–1)
BUN BLDV-MCNC: 17 MG/DL (ref 7–18)
CHLORIDE BLD-SCNC: 103 MEQ/L (ref 98–107)
CO2: 29 MEQ/L (ref 21–32)
CREAT SERPL-MCNC: 1 MG/DL (ref 0.6–1.3)
EOSINOPHILS RELATIVE PERCENT: 2.3 % (ref 0–4)
GFR, ESTIMATED: 62 ML/MIN/1.73M2
GLUCOSE BLD-MCNC: 149 MG/DL (ref 74–106)
HCT VFR BLD CALC: 39.9 % (ref 37–47)
HEMOGLOBIN: 13.8 GM/DL (ref 12–16)
LYMPHOCYTES # BLD: 14.2 % (ref 15–47)
MCH RBC QN AUTO: 32.1 PG (ref 27–31)
MCHC RBC AUTO-ENTMCNC: 34.6 GM/DL (ref 33–37)
MCV RBC AUTO: 92.6 FL (ref 81–99)
MONOCYTES: 6 % (ref 0–12)
PDW BLD-RTO: 12.4 % (ref 11.5–14.5)
PLATELET # BLD: 215 THOU/MM3 (ref 130–400)
PMV BLD AUTO: 8.1 FL (ref 7.4–10.4)
POC CALCIUM: 9.4 MG/DL (ref 8.5–10.1)
POTASSIUM SERPL-SCNC: 3.7 MEQ/L (ref 3.5–5.1)
RBC # BLD: 4.31 MILL/MM3 (ref 4.2–5.4)
SEGS: 77.1 % (ref 43–75)
SODIUM BLD-SCNC: 142 MEQ/L (ref 136–145)
TOTAL PROTEIN: 7 GM/DL (ref 6.4–8.2)
WBC # BLD: 7.4 THOU/MM3 (ref 4.8–10.8)

## 2019-07-22 PROCEDURE — 96375 TX/PRO/DX INJ NEW DRUG ADDON: CPT

## 2019-07-22 PROCEDURE — 80053 COMPREHEN METABOLIC PANEL: CPT

## 2019-07-22 PROCEDURE — 99284 EMERGENCY DEPT VISIT MOD MDM: CPT

## 2019-07-22 PROCEDURE — 96374 THER/PROPH/DIAG INJ IV PUSH: CPT

## 2019-07-22 PROCEDURE — 85025 COMPLETE CBC W/AUTO DIFF WBC: CPT

## 2019-07-22 PROCEDURE — 6360000002 HC RX W HCPCS: Performed by: EMERGENCY MEDICINE

## 2019-07-22 PROCEDURE — 70450 CT HEAD/BRAIN W/O DYE: CPT

## 2019-07-22 PROCEDURE — 36415 COLL VENOUS BLD VENIPUNCTURE: CPT

## 2019-07-22 PROCEDURE — 2709999900 HC NON-CHARGEABLE SUPPLY

## 2019-07-22 RX ORDER — PROMETHAZINE HYDROCHLORIDE 25 MG/1
25 TABLET ORAL EVERY 6 HOURS PRN
Qty: 12 TABLET | Refills: 0 | Status: SHIPPED | OUTPATIENT
Start: 2019-07-22 | End: 2019-07-25

## 2019-07-22 RX ORDER — HYDROCODONE BITARTRATE AND ACETAMINOPHEN 5; 325 MG/1; MG/1
1 TABLET ORAL EVERY 6 HOURS PRN
Qty: 8 TABLET | Refills: 0 | Status: SHIPPED | OUTPATIENT
Start: 2019-07-22 | End: 2019-07-24

## 2019-07-22 RX ORDER — DIPHENHYDRAMINE HYDROCHLORIDE 50 MG/ML
25 INJECTION INTRAMUSCULAR; INTRAVENOUS ONCE
Status: COMPLETED | OUTPATIENT
Start: 2019-07-22 | End: 2019-07-22

## 2019-07-22 RX ORDER — METOCLOPRAMIDE HYDROCHLORIDE 5 MG/ML
10 INJECTION INTRAMUSCULAR; INTRAVENOUS ONCE
Status: COMPLETED | OUTPATIENT
Start: 2019-07-22 | End: 2019-07-22

## 2019-07-22 RX ORDER — METHYLPREDNISOLONE SODIUM SUCCINATE 125 MG/2ML
125 INJECTION, POWDER, LYOPHILIZED, FOR SOLUTION INTRAMUSCULAR; INTRAVENOUS ONCE
Status: COMPLETED | OUTPATIENT
Start: 2019-07-22 | End: 2019-07-22

## 2019-07-22 RX ADMIN — HYDROMORPHONE HYDROCHLORIDE 0.5 MG: 1 INJECTION, SOLUTION INTRAMUSCULAR; INTRAVENOUS; SUBCUTANEOUS at 13:20

## 2019-07-22 RX ADMIN — DIPHENHYDRAMINE HYDROCHLORIDE 25 MG: 50 INJECTION, SOLUTION INTRAMUSCULAR; INTRAVENOUS at 13:20

## 2019-07-22 RX ADMIN — METOCLOPRAMIDE 10 MG: 5 INJECTION, SOLUTION INTRAMUSCULAR; INTRAVENOUS at 13:21

## 2019-07-22 RX ADMIN — METHYLPREDNISOLONE SODIUM SUCCINATE 125 MG: 125 INJECTION, POWDER, FOR SOLUTION INTRAMUSCULAR; INTRAVENOUS at 14:41

## 2019-07-22 ASSESSMENT — ENCOUNTER SYMPTOMS
BLURRED VISION: 0
SHORTNESS OF BREATH: 0
NAUSEA: 1
PHOTOPHOBIA: 1
ABDOMINAL PAIN: 0
BACK PAIN: 0
VOMITING: 1
SORE THROAT: 0

## 2019-07-22 ASSESSMENT — PAIN DESCRIPTION - LOCATION
LOCATION: HEAD
LOCATION: HEAD

## 2019-07-22 ASSESSMENT — PAIN SCALES - GENERAL
PAINLEVEL_OUTOF10: 9
PAINLEVEL_OUTOF10: 5

## 2019-07-22 ASSESSMENT — PAIN DESCRIPTION - DESCRIPTORS
DESCRIPTORS: CONSTANT
DESCRIPTORS: CONSTANT

## 2019-07-22 NOTE — ED PROVIDER NOTES
PM      CONTINUE these medications which have NOT CHANGED    Details   IBUPROFEN PO Take by mouthHistorical Med      aspirin 500 MG tablet Take 1,000 mg by mouth every 6 hours as needed for Pain caffeine also in productHistorical Med      lisinopril (ZESTRIL) 10 MG tablet Take 10 mg by mouth dailyHistorical Med      ondansetron (ZOFRAN ODT) 4 MG disintegrating tablet Take 1 tablet by mouth every 8 hours as needed for Nausea or Vomiting, Disp-6 tablet, R-0Print      diphenhydrAMINE (BENADRYL) 25 MG capsule Take 25 mg by mouth every 6 hours as needed for ItchingHistorical Med      SUMAtriptan (IMITREX) 5 MG/ACT nasal spray 1 spray by Nasal route daily as needed for Migraine      Escitalopram Oxalate (LEXAPRO PO) Take by mouth             ALLERGIES    has No Known Allergies. FAMILY HISTORY    has no family status information on file. family history is not on file. SOCIAL HISTORY     reports that she has been smoking. She has been smoking about 0.25 packs per day. She has never used smokeless tobacco. She reports that she drinks alcohol. She reports that she does not use drugs. PHYSICAL EXAM       INITIAL VITALS: /70   Pulse 77   Temp 98.2 °F (36.8 °C) (Oral)   Resp 16   Ht 5' 3\" (1.6 m)   Wt 200 lb (90.7 kg)   SpO2 95%   BMI 35.43 kg/m²      Physical Exam   Constitutional: She is oriented to person, place, and time. She appears well-developed and well-nourished. She appears distressed. Lying in a darkened room, tearful   HENT:   Head: Normocephalic and atraumatic. Right Ear: External ear normal.   Left Ear: External ear normal.   Nose: Nose normal.   Mouth/Throat: Oropharynx is clear and moist.   Eyes: Pupils are equal, round, and reactive to light. EOM are normal.   Neck: Neck supple. No meningeal signs   Cardiovascular: Normal rate and regular rhythm. No murmur heard. Pulmonary/Chest: Effort normal and breath sounds normal. No respiratory distress. Abdominal: Soft.  Bowel sounds are normal. She exhibits no mass. There is no tenderness. Musculoskeletal: She exhibits no edema or tenderness. Lymphadenopathy:     She has no cervical adenopathy. Neurological: She is alert and oriented to person, place, and time. She exhibits normal muscle tone. Coordination normal.   Skin: Skin is warm and dry. No rash noted. She is not diaphoretic. Psychiatric:   Pain behavior, tearful   Nursing note and vitals reviewed. RADIOLOGY:    CT HEAD WO CONTRAST   Final Result   No acute intracranial findings. **This report has been created using voice recognition software. It may contain minor errors which are inherent in voice recognition technology. **      Final report electronically signed by Dr. Mike Amaral on 7/22/2019 2:07 PM             LABS:     Labs Reviewed   CBC WITH AUTO DIFFERENTIAL - Abnormal; Notable for the following components:       Result Value    MCH 32.1 (*)     SEGS 77.1 (*)     Lymphocytes 14.2 (*)     All other components within normal limits   COMPREHENSIVE METABOLIC PANEL - Abnormal; Notable for the following components:    Glucose 149 (*)     AST 14 (*)     All other components within normal limits   GLOMERULAR FILTRATION RATE, ESTIMATED - Abnormal; Notable for the following components:    GFR, Estimated 62 (*)     All other components within normal limits   ANION GAP       Vitals:    Vitals:    07/22/19 1256 07/22/19 1417   BP: 118/76 119/70   Pulse: 85 77   Resp: 18 16   Temp: 98.2 °F (36.8 °C)    TempSrc: Oral    SpO2: 95%    Weight: 200 lb (90.7 kg)    Height: 5' 3\" (1.6 m)        EMERGENCY DEPARTMENT COURSE:    Much better with IVF, Benadryl, Reglan, Dilaudid, and Solu Medrol. Discussed recent positive ERIKA, is being referred to rheumatology. She has had some joint pains, no rash. She wonders if this is linked to her headaches, instructed to discuss this with her doctor. Consider neurology referral.       FINAL IMPRESSION      1.  Migraine without aura and without

## 2019-08-20 ENCOUNTER — HOSPITAL ENCOUNTER (EMERGENCY)
Age: 54
Discharge: HOME | End: 2019-08-20
Attending: FAMILY MEDICINE
Payer: COMMERCIAL

## 2019-08-20 VITALS
RESPIRATION RATE: 15 BRPM | HEART RATE: 74 BPM | DIASTOLIC BLOOD PRESSURE: 60 MMHG | TEMPERATURE: 96.7 F | SYSTOLIC BLOOD PRESSURE: 120 MMHG | OXYGEN SATURATION: 98 %

## 2019-08-20 DIAGNOSIS — G43.009 MIGRAINE WITHOUT AURA AND WITHOUT STATUS MIGRAINOSUS, NOT INTRACTABLE: Primary | ICD-10-CM

## 2019-08-20 PROCEDURE — 2580000003 HC RX 258: Performed by: FAMILY MEDICINE

## 2019-08-20 PROCEDURE — 96374 THER/PROPH/DIAG INJ IV PUSH: CPT

## 2019-08-20 PROCEDURE — 2709999900 HC NON-CHARGEABLE SUPPLY

## 2019-08-20 PROCEDURE — 99283 EMERGENCY DEPT VISIT LOW MDM: CPT

## 2019-08-20 PROCEDURE — 6370000000 HC RX 637 (ALT 250 FOR IP): Performed by: FAMILY MEDICINE

## 2019-08-20 PROCEDURE — 6360000002 HC RX W HCPCS: Performed by: FAMILY MEDICINE

## 2019-08-20 PROCEDURE — 96375 TX/PRO/DX INJ NEW DRUG ADDON: CPT

## 2019-08-20 RX ORDER — METOCLOPRAMIDE HYDROCHLORIDE 5 MG/ML
10 INJECTION INTRAMUSCULAR; INTRAVENOUS ONCE
Status: COMPLETED | OUTPATIENT
Start: 2019-08-20 | End: 2019-08-20

## 2019-08-20 RX ORDER — 0.9 % SODIUM CHLORIDE 0.9 %
1000 INTRAVENOUS SOLUTION INTRAVENOUS ONCE
Status: COMPLETED | OUTPATIENT
Start: 2019-08-20 | End: 2019-08-20

## 2019-08-20 RX ORDER — DIPHENHYDRAMINE HYDROCHLORIDE 50 MG/ML
25 INJECTION INTRAMUSCULAR; INTRAVENOUS ONCE
Status: COMPLETED | OUTPATIENT
Start: 2019-08-20 | End: 2019-08-20

## 2019-08-20 RX ORDER — KETOROLAC TROMETHAMINE 10 MG/1
10 TABLET, FILM COATED ORAL EVERY 6 HOURS PRN
COMMUNITY
End: 2020-04-09 | Stop reason: ALTCHOICE

## 2019-08-20 RX ORDER — KETOROLAC TROMETHAMINE 30 MG/ML
30 INJECTION, SOLUTION INTRAMUSCULAR; INTRAVENOUS ONCE
Status: DISCONTINUED | OUTPATIENT
Start: 2019-08-20 | End: 2019-08-20

## 2019-08-20 RX ADMIN — SODIUM CHLORIDE 1000 ML: 9 INJECTION, SOLUTION INTRAVENOUS at 16:08

## 2019-08-20 RX ADMIN — DIPHENHYDRAMINE HYDROCHLORIDE 25 MG: 50 INJECTION, SOLUTION INTRAMUSCULAR; INTRAVENOUS at 16:09

## 2019-08-20 RX ADMIN — Medication: at 17:34

## 2019-08-20 RX ADMIN — HYDROMORPHONE HYDROCHLORIDE 1 MG: 1 INJECTION, SOLUTION INTRAMUSCULAR; INTRAVENOUS; SUBCUTANEOUS at 16:12

## 2019-08-20 RX ADMIN — METOCLOPRAMIDE 10 MG: 5 INJECTION, SOLUTION INTRAMUSCULAR; INTRAVENOUS at 16:10

## 2019-08-20 ASSESSMENT — PAIN DESCRIPTION - LOCATION: LOCATION: HEAD

## 2019-08-20 ASSESSMENT — PAIN SCALES - GENERAL
PAINLEVEL_OUTOF10: 10
PAINLEVEL_OUTOF10: 2
PAINLEVEL_OUTOF10: 8

## 2019-08-20 NOTE — ED NOTES
Pt presenta amb and tearful with complaint of sudden onset of severe migraine. Pt holding back of neck and  States pain starts there and goes up her head. Pt requests ice for neck which she says helps it. Pt denies vomiting. States is nauseated. Pt denies headache prior to going to work today.       Gracia Moon, [place]  08/20/19 [address] [facility], [place]  08/20/19 4809

## 2019-08-21 ASSESSMENT — ENCOUNTER SYMPTOMS
SINUS PRESSURE: 0
PHOTOPHOBIA: 1
NAUSEA: 1
BACK PAIN: 0
CHEST TIGHTNESS: 0
VOMITING: 0
WHEEZING: 0
SORE THROAT: 0
DIARRHEA: 0
ABDOMINAL PAIN: 0
COLOR CHANGE: 0
SHORTNESS OF BREATH: 0

## 2019-10-28 ENCOUNTER — HOSPITAL ENCOUNTER (OUTPATIENT)
Age: 54
Discharge: HOME | End: 2019-10-28
Payer: COMMERCIAL

## 2019-10-28 LAB
ANION GAP SERPL CALCULATED.3IONS-SCNC: 12 MEQ/L (ref 8–16)
BASOPHILS # BLD: 1.2 %
BASOPHILS ABSOLUTE: 0.1 THOU/MM3 (ref 0–0.1)
BUN BLDV-MCNC: 17 MG/DL (ref 7–22)
CALCIUM SERPL-MCNC: 9.7 MG/DL (ref 8.5–10.5)
CHLORIDE BLD-SCNC: 100 MEQ/L (ref 98–111)
CO2: 23 MEQ/L (ref 23–33)
CREAT SERPL-MCNC: 1 MG/DL (ref 0.4–1.2)
EOSINOPHIL # BLD: 4.5 %
EOSINOPHILS ABSOLUTE: 0.3 THOU/MM3 (ref 0–0.4)
ERYTHROCYTE [DISTWIDTH] IN BLOOD BY AUTOMATED COUNT: 13.9 % (ref 11.5–14.5)
ERYTHROCYTE [DISTWIDTH] IN BLOOD BY AUTOMATED COUNT: 49.3 FL (ref 35–45)
GFR SERPL CREATININE-BSD FRML MDRD: 58 ML/MIN/1.73M2
GLUCOSE BLD-MCNC: 98 MG/DL (ref 70–108)
HCT VFR BLD CALC: 43.5 % (ref 37–47)
HEMOGLOBIN: 13.8 GM/DL (ref 12–16)
IMMATURE GRANS (ABS): 0.02 THOU/MM3 (ref 0–0.07)
IMMATURE GRANULOCYTES: 0.3 %
LYMPHOCYTES # BLD: 36 %
LYMPHOCYTES ABSOLUTE: 2.4 THOU/MM3 (ref 1–4.8)
MCH RBC QN AUTO: 30.6 PG (ref 26–33)
MCHC RBC AUTO-ENTMCNC: 31.7 GM/DL (ref 32.2–35.5)
MCV RBC AUTO: 96.5 FL (ref 81–99)
MONOCYTES # BLD: 6.6 %
MONOCYTES ABSOLUTE: 0.4 THOU/MM3 (ref 0.4–1.3)
NUCLEATED RED BLOOD CELLS: 0 /100 WBC
PLATELET # BLD: 241 THOU/MM3 (ref 130–400)
PMV BLD AUTO: 11.2 FL (ref 9.4–12.4)
POTASSIUM SERPL-SCNC: 4.2 MEQ/L (ref 3.5–5.2)
RBC # BLD: 4.51 MILL/MM3 (ref 4.2–5.4)
SEG NEUTROPHILS: 51.4 %
SEGMENTED NEUTROPHILS ABSOLUTE COUNT: 3.4 THOU/MM3 (ref 1.8–7.7)
SODIUM BLD-SCNC: 135 MEQ/L (ref 135–145)
WBC # BLD: 6.6 THOU/MM3 (ref 4.8–10.8)

## 2019-10-28 PROCEDURE — 85025 COMPLETE CBC W/AUTO DIFF WBC: CPT

## 2019-10-28 PROCEDURE — 80048 BASIC METABOLIC PNL TOTAL CA: CPT

## 2019-10-28 PROCEDURE — 36415 COLL VENOUS BLD VENIPUNCTURE: CPT

## 2019-12-26 ENCOUNTER — HOSPITAL ENCOUNTER (EMERGENCY)
Age: 54
Discharge: HOME | End: 2019-12-26
Attending: FAMILY MEDICINE
Payer: COMMERCIAL

## 2019-12-26 ENCOUNTER — APPOINTMENT (OUTPATIENT)
Dept: GENERAL RADIOLOGY | Age: 54
End: 2019-12-26
Payer: COMMERCIAL

## 2019-12-26 VITALS
SYSTOLIC BLOOD PRESSURE: 123 MMHG | TEMPERATURE: 97 F | DIASTOLIC BLOOD PRESSURE: 63 MMHG | WEIGHT: 210 LBS | HEIGHT: 63 IN | HEART RATE: 89 BPM | BODY MASS INDEX: 37.21 KG/M2 | OXYGEN SATURATION: 95 % | RESPIRATION RATE: 15 BRPM

## 2019-12-26 DIAGNOSIS — J01.00 ACUTE MAXILLARY SINUSITIS, RECURRENCE NOT SPECIFIED: Primary | ICD-10-CM

## 2019-12-26 DIAGNOSIS — J40 BRONCHITIS: ICD-10-CM

## 2019-12-26 PROCEDURE — 99283 EMERGENCY DEPT VISIT LOW MDM: CPT

## 2019-12-26 PROCEDURE — 96374 THER/PROPH/DIAG INJ IV PUSH: CPT

## 2019-12-26 PROCEDURE — 96372 THER/PROPH/DIAG INJ SC/IM: CPT

## 2019-12-26 PROCEDURE — 71046 X-RAY EXAM CHEST 2 VIEWS: CPT

## 2019-12-26 PROCEDURE — 6360000002 HC RX W HCPCS: Performed by: FAMILY MEDICINE

## 2019-12-26 RX ORDER — PREDNISONE 10 MG/1
TABLET ORAL
Qty: 20 TABLET | Refills: 0 | Status: SHIPPED | OUTPATIENT
Start: 2019-12-26 | End: 2020-01-05

## 2019-12-26 RX ORDER — LEVOCETIRIZINE DIHYDROCHLORIDE 5 MG/1
TABLET, FILM COATED ORAL
COMMUNITY
Start: 2019-10-30 | End: 2021-09-07

## 2019-12-26 RX ORDER — KETOROLAC TROMETHAMINE 30 MG/ML
30 INJECTION, SOLUTION INTRAMUSCULAR; INTRAVENOUS ONCE
Status: COMPLETED | OUTPATIENT
Start: 2019-12-26 | End: 2019-12-26

## 2019-12-26 RX ORDER — ALBUTEROL SULFATE 90 UG/1
2 AEROSOL, METERED RESPIRATORY (INHALATION) EVERY 4 HOURS PRN
Qty: 1 INHALER | Refills: 0 | Status: SHIPPED | OUTPATIENT
Start: 2019-12-26 | End: 2020-08-30

## 2019-12-26 RX ORDER — PROMETHAZINE HYDROCHLORIDE 25 MG/ML
25 INJECTION, SOLUTION INTRAMUSCULAR; INTRAVENOUS ONCE
Status: COMPLETED | OUTPATIENT
Start: 2019-12-26 | End: 2019-12-26

## 2019-12-26 RX ORDER — AMOXICILLIN AND CLAVULANATE POTASSIUM 875; 125 MG/1; MG/1
1 TABLET, FILM COATED ORAL 2 TIMES DAILY
Qty: 20 TABLET | Refills: 0 | Status: SHIPPED | OUTPATIENT
Start: 2019-12-26 | End: 2020-01-05

## 2019-12-26 RX ADMIN — PROMETHAZINE HYDROCHLORIDE 25 MG: 25 INJECTION INTRAMUSCULAR; INTRAVENOUS at 12:44

## 2019-12-26 RX ADMIN — KETOROLAC TROMETHAMINE 30 MG: 30 INJECTION, SOLUTION INTRAMUSCULAR at 12:39

## 2019-12-26 ASSESSMENT — ENCOUNTER SYMPTOMS
VOMITING: 0
COUGH: 1
SINUS PRESSURE: 1
SINUS PAIN: 1
EYE REDNESS: 0
SORE THROAT: 0
NAUSEA: 1
EYE DISCHARGE: 0

## 2019-12-26 ASSESSMENT — PAIN DESCRIPTION - LOCATION
LOCATION: HEAD;FACE
LOCATION_2: ABDOMEN
LOCATION: HEAD;FACE

## 2019-12-26 ASSESSMENT — PAIN SCALES - GENERAL
PAINLEVEL_OUTOF10: 5
PAINLEVEL_OUTOF10: 8

## 2019-12-26 ASSESSMENT — PAIN DESCRIPTION - INTENSITY: RATING_2: 8

## 2020-04-09 ENCOUNTER — HOSPITAL ENCOUNTER (EMERGENCY)
Age: 55
Discharge: HOME | End: 2020-04-09
Attending: EMERGENCY MEDICINE
Payer: COMMERCIAL

## 2020-04-09 ENCOUNTER — APPOINTMENT (OUTPATIENT)
Dept: CT IMAGING | Age: 55
End: 2020-04-09
Payer: COMMERCIAL

## 2020-04-09 VITALS
HEIGHT: 63 IN | BODY MASS INDEX: 35.44 KG/M2 | HEART RATE: 86 BPM | OXYGEN SATURATION: 96 % | SYSTOLIC BLOOD PRESSURE: 139 MMHG | TEMPERATURE: 98 F | WEIGHT: 200 LBS | DIASTOLIC BLOOD PRESSURE: 78 MMHG | RESPIRATION RATE: 16 BRPM

## 2020-04-09 VITALS
DIASTOLIC BLOOD PRESSURE: 64 MMHG | BODY MASS INDEX: 35.44 KG/M2 | TEMPERATURE: 98.2 F | SYSTOLIC BLOOD PRESSURE: 105 MMHG | OXYGEN SATURATION: 95 % | RESPIRATION RATE: 16 BRPM | HEIGHT: 63 IN | HEART RATE: 91 BPM | WEIGHT: 200 LBS

## 2020-04-09 PROCEDURE — 6360000002 HC RX W HCPCS: Performed by: EMERGENCY MEDICINE

## 2020-04-09 PROCEDURE — 99283 EMERGENCY DEPT VISIT LOW MDM: CPT

## 2020-04-09 PROCEDURE — 70450 CT HEAD/BRAIN W/O DYE: CPT

## 2020-04-09 PROCEDURE — 99284 EMERGENCY DEPT VISIT MOD MDM: CPT

## 2020-04-09 PROCEDURE — 2709999900 HC NON-CHARGEABLE SUPPLY

## 2020-04-09 PROCEDURE — 6370000000 HC RX 637 (ALT 250 FOR IP): Performed by: EMERGENCY MEDICINE

## 2020-04-09 PROCEDURE — 96372 THER/PROPH/DIAG INJ SC/IM: CPT

## 2020-04-09 PROCEDURE — 96374 THER/PROPH/DIAG INJ IV PUSH: CPT

## 2020-04-09 RX ORDER — MEPERIDINE HYDROCHLORIDE 25 MG/ML
50 INJECTION INTRAMUSCULAR; INTRAVENOUS; SUBCUTANEOUS ONCE
Status: COMPLETED | OUTPATIENT
Start: 2020-04-09 | End: 2020-04-09

## 2020-04-09 RX ORDER — SUMATRIPTAN 6 MG/.5ML
6 INJECTION, SOLUTION SUBCUTANEOUS ONCE
Status: COMPLETED | OUTPATIENT
Start: 2020-04-09 | End: 2020-04-09

## 2020-04-09 RX ORDER — PROMETHAZINE HYDROCHLORIDE 25 MG/ML
25 INJECTION, SOLUTION INTRAMUSCULAR; INTRAVENOUS ONCE
Status: COMPLETED | OUTPATIENT
Start: 2020-04-09 | End: 2020-04-09

## 2020-04-09 RX ORDER — KETOROLAC TROMETHAMINE 30 MG/ML
30 INJECTION, SOLUTION INTRAMUSCULAR; INTRAVENOUS ONCE
Status: COMPLETED | OUTPATIENT
Start: 2020-04-09 | End: 2020-04-09

## 2020-04-09 RX ORDER — DIPHENHYDRAMINE HYDROCHLORIDE 50 MG/ML
50 INJECTION INTRAMUSCULAR; INTRAVENOUS ONCE
Status: COMPLETED | OUTPATIENT
Start: 2020-04-09 | End: 2020-04-09

## 2020-04-09 RX ADMIN — DIPHENHYDRAMINE HYDROCHLORIDE 50 MG: 50 INJECTION, SOLUTION INTRAMUSCULAR; INTRAVENOUS at 01:49

## 2020-04-09 RX ADMIN — SUMATRIPTAN 6 MG: 6 INJECTION, SOLUTION SUBCUTANEOUS at 01:47

## 2020-04-09 RX ADMIN — MEPERIDINE HYDROCHLORIDE 50 MG: 25 INJECTION INTRAMUSCULAR; INTRAVENOUS; SUBCUTANEOUS at 06:31

## 2020-04-09 RX ADMIN — PROMETHAZINE HYDROCHLORIDE 25 MG: 25 INJECTION INTRAMUSCULAR; INTRAVENOUS at 01:48

## 2020-04-09 RX ADMIN — PROMETHAZINE HYDROCHLORIDE 25 MG: 25 INJECTION INTRAMUSCULAR; INTRAVENOUS at 06:32

## 2020-04-09 RX ADMIN — KETOROLAC TROMETHAMINE 30 MG: 30 INJECTION, SOLUTION INTRAMUSCULAR at 01:49

## 2020-04-09 RX ADMIN — KETOROLAC TROMETHAMINE 30 MG: 30 INJECTION, SOLUTION INTRAMUSCULAR at 06:32

## 2020-04-09 ASSESSMENT — PAIN DESCRIPTION - LOCATION
LOCATION: HEAD;NECK
LOCATION: HEAD

## 2020-04-09 ASSESSMENT — PAIN DESCRIPTION - DESCRIPTORS
DESCRIPTORS: HEADACHE
DESCRIPTORS: PRESSURE;HEADACHE

## 2020-04-09 ASSESSMENT — ENCOUNTER SYMPTOMS
BLOOD IN STOOL: 0
PHOTOPHOBIA: 1
SHORTNESS OF BREATH: 0
WHEEZING: 0
SHORTNESS OF BREATH: 0
PHOTOPHOBIA: 1
VOMITING: 0
EYE PAIN: 0
NAUSEA: 1
DIARRHEA: 0
WHEEZING: 0
ABDOMINAL PAIN: 0
SORE THROAT: 0
DIARRHEA: 0
SORE THROAT: 0
ABDOMINAL PAIN: 0
BLOOD IN STOOL: 0
NAUSEA: 1

## 2020-04-09 ASSESSMENT — PAIN SCALES - GENERAL
PAINLEVEL_OUTOF10: 8
PAINLEVEL_OUTOF10: 10
PAINLEVEL_OUTOF10: 7
PAINLEVEL_OUTOF10: 8
PAINLEVEL_OUTOF10: 8
PAINLEVEL_OUTOF10: 10
PAINLEVEL_OUTOF10: 1

## 2020-04-09 NOTE — ED TRIAGE NOTES
Patient reports, \"migraine started yesterday about 1100 am it woke me up out of a sleep. Has got increasing worse. My neck hurts and it feels like it needs to be cracked. I have had this before. It hasn't hurt this bad in a long time. I took 4 Excedrin at midnight it didn't help. I hate coming here for this. I need a shot for this pain. \" Observed patient sitting up on the bed with ice pack on head. Declines laying down.

## 2020-04-09 NOTE — ED PROVIDER NOTES
[address]  [address] [facility] [address]  Phone: [phone]    eMERGENCY dEPARTMENT eNCOUnter           CHIEF COMPLAINT       Chief Complaint   Patient presents with    Migraine       Nurses Notes reviewed and I agree except as noted in the HPI. HISTORY OF PRESENT ILLNESS    Constanza Osorio is a 48 y.o. female who presented via private vehicle with a chief complaint mentioned above. Patient was seen earlier for the same complaint and return because her headache rebounded. She describes her headache as severe frontal throbbing which radiates to the top of her head. She denies focal weakness or numbness. She denies mental status change. She has nausea but no vomiting. He stated this is not the worst headache in her life. REVIEW OF SYSTEMS     Review of Systems   Constitutional: Negative for chills and fever. HENT: Negative for sore throat. Eyes: Positive for photophobia. Negative for pain. Respiratory: Negative for shortness of breath and wheezing. Cardiovascular: Negative for chest pain and palpitations. Gastrointestinal: Positive for nausea. Negative for abdominal pain, blood in stool and diarrhea. Genitourinary: Negative for dysuria and hematuria. Musculoskeletal: Negative for neck pain and neck stiffness. Neurological: Positive for headaches. Negative for seizures and syncope. Psychiatric/Behavioral: Negative for confusion. PAST MEDICAL HISTORY    has a past medical history of Headache(784.0). SURGICAL HISTORY      has a past surgical history that includes  section; Endometrial ablation; Carpal tunnel release (Right); and Ankle Fusion.     CURRENT MEDICATIONS       Previous Medications    ALBUTEROL SULFATE HFA (PROVENTIL HFA) 108 (90 BASE) MCG/ACT INHALER    Inhale 2 puffs into the lungs every 4 hours as needed for Wheezing or Shortness of Breath (Space out to every 6 hours as symptoms improve) Space out to every 6 hours as symptoms improve. ESCITALOPRAM OXALATE (LEXAPRO PO)    Take by mouth    IBUPROFEN PO    Take by mouth    LEVOCETIRIZINE (XYZAL) 5 MG TABLET           ALLERGIES     has No Known Allergies. FAMILY HISTORY     has no family status information on file. family history is not on file. SOCIAL HISTORY      reports that she has been smoking cigarettes. She has been smoking about 0.50 packs per day. She has never used smokeless tobacco. She reports current alcohol use. She reports that she does not use drugs. PHYSICAL EXAM     INITIAL VITALS:  height is 5' 3\" (1.6 m) and weight is 200 lb (90.7 kg). Her temperature is 98.2 °F (36.8 °C). Her blood pressure is 103/78 and her pulse is 95. Her respiration is 17 and oxygen saturation is 98%. Physical Exam   Constitutional: She appears well-developed and well-nourished. She appears distressed. HENT:   Head: Atraumatic. Mouth/Throat: Oropharynx is clear and moist.   Eyes: Pupils are equal, round, and reactive to light. Conjunctivae are normal.   Neck: Neck supple. No JVD present. No tracheal deviation present. No thyromegaly present. Cardiovascular: Normal rate and regular rhythm. Exam reveals no gallop and no friction rub. No murmur heard. Pulmonary/Chest: Effort normal and breath sounds normal.   Musculoskeletal:         General: No tenderness or edema. Neurological: She is alert. She has normal strength. No cranial nerve deficit. Coordination normal.   Nursing note and vitals reviewed. DIFFERENTIAL DIAGNOSIS:       DIAGNOSTIC RESULTS       RADIOLOGY:   Head CT without contrast was unremarkable.   LABS:   Labs Reviewed - No data to display    EMERGENCY DEPARTMENT COURSE:   Vitals:    Vitals:    04/09/20 0617   BP: 103/78   Pulse: 95   Resp: 17   Temp: 98.2 °F (36.8 °C)   SpO2: 98%   Weight: 200 lb (90.7 kg)   Height: 5' 3\" (1.6 m)     She received Phenergan, Demerol and Toradol IM,  Reevaluation at 7:40 AM;  Resting comfortably, she

## 2020-04-09 NOTE — ED NOTES
Observed patient fully dressed sitting on the bed, resp easy, warm and dry. Patient stable requesting discharge paperwork, provided. Patient educated on rest, diet, pain control, signs and symptoms, and follow up. Patient verbalized understanding, questions denied. Patient steadily assisted in leaving department. Observed patient leave with significant.      Katrina Hassan RN  04/09/20 8987

## 2020-04-09 NOTE — ED NOTES
Pt released ambulatory in stable condition. Offered w/c but declined. Resp easy, non-labored. Skin warm, dry. Color pink. AVS reviewed. Pt voiced understanding.      Lexy Ghosh RN  04/09/20 7848

## 2020-08-30 ENCOUNTER — HOSPITAL ENCOUNTER (EMERGENCY)
Age: 55
Discharge: HOME | End: 2020-08-30
Attending: FAMILY MEDICINE
Payer: COMMERCIAL

## 2020-08-30 VITALS
HEART RATE: 77 BPM | SYSTOLIC BLOOD PRESSURE: 119 MMHG | BODY MASS INDEX: 38.98 KG/M2 | RESPIRATION RATE: 16 BRPM | DIASTOLIC BLOOD PRESSURE: 56 MMHG | WEIGHT: 220 LBS | OXYGEN SATURATION: 95 % | TEMPERATURE: 98.5 F | HEIGHT: 63 IN

## 2020-08-30 PROCEDURE — 2709999900 HC NON-CHARGEABLE SUPPLY

## 2020-08-30 PROCEDURE — 99283 EMERGENCY DEPT VISIT LOW MDM: CPT

## 2020-08-30 PROCEDURE — 6360000002 HC RX W HCPCS: Performed by: FAMILY MEDICINE

## 2020-08-30 PROCEDURE — 99282 EMERGENCY DEPT VISIT SF MDM: CPT

## 2020-08-30 PROCEDURE — 96372 THER/PROPH/DIAG INJ SC/IM: CPT

## 2020-08-30 PROCEDURE — 6370000000 HC RX 637 (ALT 250 FOR IP): Performed by: FAMILY MEDICINE

## 2020-08-30 RX ORDER — HYDROXYZINE HYDROCHLORIDE 25 MG/1
25 TABLET, FILM COATED ORAL EVERY 6 HOURS PRN
Qty: 20 TABLET | Refills: 0 | Status: SHIPPED | OUTPATIENT
Start: 2020-08-30 | End: 2020-09-09

## 2020-08-30 RX ORDER — HYDROXYZINE HYDROCHLORIDE 10 MG/1
TABLET, FILM COATED ORAL
Status: DISCONTINUED
Start: 2020-08-30 | End: 2020-08-30 | Stop reason: WASHOUT

## 2020-08-30 RX ORDER — LEVOTHYROXINE SODIUM 0.05 MG/1
50 TABLET ORAL DAILY
COMMUNITY
End: 2021-04-04

## 2020-08-30 RX ORDER — METHYLPREDNISOLONE SODIUM SUCCINATE 125 MG/2ML
60 INJECTION, POWDER, LYOPHILIZED, FOR SOLUTION INTRAMUSCULAR; INTRAVENOUS ONCE
Status: COMPLETED | OUTPATIENT
Start: 2020-08-30 | End: 2020-08-30

## 2020-08-30 RX ORDER — METHYLPREDNISOLONE 4 MG/1
TABLET ORAL
Qty: 1 KIT | Refills: 0 | Status: SHIPPED | OUTPATIENT
Start: 2020-08-30 | End: 2020-09-05

## 2020-08-30 RX ORDER — HYDROXYZINE HYDROCHLORIDE 10 MG/1
25 TABLET, FILM COATED ORAL ONCE
Status: COMPLETED | OUTPATIENT
Start: 2020-08-30 | End: 2020-08-30

## 2020-08-30 RX ADMIN — METHYLPREDNISOLONE SODIUM SUCCINATE 60 MG: 125 INJECTION, POWDER, FOR SOLUTION INTRAMUSCULAR; INTRAVENOUS at 13:49

## 2020-08-30 RX ADMIN — HYDROXYZINE HYDROCHLORIDE 25 MG: 10 TABLET, FILM COATED ORAL at 13:49

## 2020-08-30 ASSESSMENT — PAIN DESCRIPTION - LOCATION
LOCATION: FOOT;HAND
LOCATION: FOOT;HAND

## 2020-08-30 ASSESSMENT — ENCOUNTER SYMPTOMS
COUGH: 0
TROUBLE SWALLOWING: 0
SORE THROAT: 0
SHORTNESS OF BREATH: 0

## 2020-08-30 ASSESSMENT — PAIN DESCRIPTION - ORIENTATION
ORIENTATION: RIGHT;LEFT
ORIENTATION: LEFT;RIGHT

## 2020-08-30 ASSESSMENT — PAIN SCALES - GENERAL
PAINLEVEL_OUTOF10: 5
PAINLEVEL_OUTOF10: 9

## 2020-08-30 ASSESSMENT — PAIN DESCRIPTION - PAIN TYPE
TYPE: ACUTE PAIN;OTHER (COMMENT)
TYPE: ACUTE PAIN

## 2020-08-30 ASSESSMENT — PAIN - FUNCTIONAL ASSESSMENT: PAIN_FUNCTIONAL_ASSESSMENT: 0-10

## 2020-08-30 ASSESSMENT — PAIN DESCRIPTION - DESCRIPTORS
DESCRIPTORS: BURNING;OTHER (COMMENT)
DESCRIPTORS: ACHING;ITCHING

## 2020-08-30 NOTE — ED PROVIDER NOTES
[facility]  eMERGENCY dEPARTMENT eNCOUnter          [address] [facility]       Chief Complaint   Patient presents with    Rash     itching and slight redness of palms of hands and feet. Had teeth extracted Friday and started PCN and tramadol       Nurses Notes reviewed and I agree except as noted in the HPI. HISTORY OF PRESENT ILLNESS    Dimitrios Miranda is a 48 y.o. female who presents itching and slight redness to the palms of the hands and feet. Patient notes a few days ago she had dental extraction and at that time was started on penicillin and tramadol. She is unaware of previous drug allergies. She does admit yesterday to taking some friends EpiPen. She does not believe that the EpiPen helped at all. She is also taken some Benadryl with only mild relief. Denies any other symptoms at this time. REVIEW OF SYSTEMS     Review of Systems   Constitutional: Negative for chills and fever. HENT: Negative for sore throat and trouble swallowing. Respiratory: Negative for cough and shortness of breath. Musculoskeletal: Negative for arthralgias and joint swelling. Skin: Positive for rash. Negative for wound. Psychiatric/Behavioral: Negative for agitation and behavioral problems. All other systems reviewed and are negative. PAST MEDICAL HISTORY    has a past medical history of Headache(784.0). SURGICAL HISTORY      has a past surgical history that includes  section; Endometrial ablation; Carpal tunnel release (Right); Ankle Fusion; and Tooth Extraction (2020). CURRENT MEDICATIONS       Previous Medications    ESCITALOPRAM OXALATE (LEXAPRO PO)    Take by mouth    LEVOCETIRIZINE (XYZAL) 5 MG TABLET        LEVOTHYROXINE (SYNTHROID) 50 MCG TABLET    Take 50 mcg by mouth Daily       ALLERGIES     has No Known Allergies. FAMILY HISTORY     has no family status information on file. family history is not on file.     SOCIAL HISTORY      reports that she has been smoking cigarettes. She has been smoking about 0.50 packs per day. She has never used smokeless tobacco. She reports current alcohol use. She reports that she does not use drugs. PHYSICAL EXAM     INITIAL VITALS:  height is 5' 3\" (1.6 m) and weight is 220 lb (99.8 kg). Her temporal temperature is 98.5 °F (36.9 °C). Her blood pressure is 131/60 and her pulse is 90. Her respiration is 16 and oxygen saturation is 96%. Physical Exam  Vitals signs and nursing note reviewed. Constitutional:       Appearance: She is not ill-appearing. Eyes:      General:         Right eye: No discharge. Left eye: No discharge. Extraocular Movements: Extraocular movements intact. Conjunctiva/sclera: Conjunctivae normal.      Pupils: Pupils are equal, round, and reactive to light. Skin:     Findings: Erythema and rash present. Neurological:      Mental Status: She is alert. Psychiatric:         Mood and Affect: Mood normal.         Behavior: Behavior normal.         DIFFERENTIAL DIAGNOSIS:   Drug reaction,RMSF,poison ivy,    DIAGNOSTIC RESULTS     EKG: All EKG's are interpreted by the Emergency Department Physician who either signs or Co-signs this chart in the absence of a cardiologist.      RADIOLOGY: non-plain film images(s) such as CT, Ultrasound and MRI are read by the radiologist.      LABS:   Labs Reviewed - No data to display    EMERGENCY DEPARTMENT COURSE:   Vitals:    Vitals:    08/30/20 1319   BP: 131/60   Pulse: 90   Resp: 16   Temp: 98.5 °F (36.9 °C)   TempSrc: Temporal   SpO2: 96%   Weight: 220 lb (99.8 kg)   Height: 5' 3\" (1.6 m)     On exam the patient's rash is mostly noted to the palms of the hands, and the feet including the dorsal and plantar aspects. There is no peeling noted. No other areas of rash eruption is noted on the body. Lungs are clear. Eyes are clear. Denies any throat swelling. This could be a drug rash rather be penicillin or tramadol I am unsure at this time. I did asked the patient to stop both medications. Solu-Medrol was provided during her course of care. I will start the patient on a Medrol Dosepak and provide her Atarax for itching. Care instructions were provided. Further follow-up was advised if symptoms should not be improving. CRITICAL CARE:       CONSULTS:      PROCEDURES:  None    FINAL IMPRESSION      1. Rash and other nonspecific skin eruption          DISPOSITION/PLAN   Home. Care instructions provided. Follow up with PCP or ED as needed. PATIENT REFERRED TO:  FAISAL Hurt CNP  [address]  [phone]    Call in 2 days  If symptoms worsen      DISCHARGE MEDICATIONS:  New Prescriptions    HYDROXYZINE (ATARAX) 25 MG TABLET    Take 1 tablet by mouth every 6 hours as needed for Itching    METHYLPREDNISOLONE (MEDROL, ANNAMARIE,) 4 MG TABLET    Take by mouth.        (Please note that portions of this note were completed with a voice recognition program.  Efforts were made to edit the dictations but occasionally words are mis-transcribed.)    MD Conrad Martinez MD  08/30/20 8477

## 2020-08-30 NOTE — ED TRIAGE NOTES
Pt had a tooth extraction on Friday. Started PCN 500mg 4 times a day, and tramadol. Last dose this am. Swelling and itching of feet and palms. Strong pedal pulses.

## 2020-08-30 NOTE — ED NOTES
Pt pink, warm and dry, breathing with ease. Pt states the itching and pain is improving. Pt is to stop taking PCN and tramadol per Dr. Asif Stanley. Pt is to call her dentist for further instructions regarding antibiotics or pain meds. Prescriptions explained, pt states understanding. AVS reviewed including follow up appointments, diagnosis, and care of self at home. Denies questions or concerns. Pt remains alert and oriented. Pt discharged in stable condition.       Darnell Aden RN  08/30/20 4259

## 2021-04-04 ENCOUNTER — HOSPITAL ENCOUNTER (EMERGENCY)
Age: 56
Discharge: HOME | End: 2021-04-05
Attending: EMERGENCY MEDICINE
Payer: COMMERCIAL

## 2021-04-04 DIAGNOSIS — G43.009 MIGRAINE WITHOUT AURA AND WITHOUT STATUS MIGRAINOSUS, NOT INTRACTABLE: Primary | ICD-10-CM

## 2021-04-04 PROCEDURE — 96372 THER/PROPH/DIAG INJ SC/IM: CPT

## 2021-04-04 PROCEDURE — 99284 EMERGENCY DEPT VISIT MOD MDM: CPT

## 2021-04-04 PROCEDURE — 6370000000 HC RX 637 (ALT 250 FOR IP): Performed by: EMERGENCY MEDICINE

## 2021-04-04 PROCEDURE — 96374 THER/PROPH/DIAG INJ IV PUSH: CPT

## 2021-04-04 PROCEDURE — 6360000002 HC RX W HCPCS: Performed by: EMERGENCY MEDICINE

## 2021-04-04 RX ORDER — DIPHENHYDRAMINE HYDROCHLORIDE 50 MG/ML
50 INJECTION INTRAMUSCULAR; INTRAVENOUS ONCE
Status: COMPLETED | OUTPATIENT
Start: 2021-04-05 | End: 2021-04-04

## 2021-04-04 RX ORDER — 0.9 % SODIUM CHLORIDE 0.9 %
500 INTRAVENOUS SOLUTION INTRAVENOUS ONCE
Status: COMPLETED | OUTPATIENT
Start: 2021-04-05 | End: 2021-04-05

## 2021-04-04 RX ORDER — SUMATRIPTAN 6 MG/.5ML
6 INJECTION, SOLUTION SUBCUTANEOUS ONCE
Status: COMPLETED | OUTPATIENT
Start: 2021-04-05 | End: 2021-04-04

## 2021-04-04 RX ORDER — ACETAMINOPHEN, ASPIRIN AND CAFFEINE 250; 250; 65 MG/1; MG/1; MG/1
1 TABLET, FILM COATED ORAL EVERY 6 HOURS PRN
COMMUNITY

## 2021-04-04 RX ORDER — KETOROLAC TROMETHAMINE 30 MG/ML
15 INJECTION, SOLUTION INTRAMUSCULAR; INTRAVENOUS ONCE
Status: COMPLETED | OUTPATIENT
Start: 2021-04-05 | End: 2021-04-04

## 2021-04-04 RX ORDER — PROMETHAZINE HYDROCHLORIDE 25 MG/ML
25 INJECTION, SOLUTION INTRAMUSCULAR; INTRAVENOUS ONCE
Status: COMPLETED | OUTPATIENT
Start: 2021-04-05 | End: 2021-04-04

## 2021-04-04 RX ADMIN — PROMETHAZINE HYDROCHLORIDE 25 MG: 25 INJECTION INTRAMUSCULAR; INTRAVENOUS at 23:57

## 2021-04-04 RX ADMIN — SUMATRIPTAN 6 MG: 6 INJECTION, SOLUTION SUBCUTANEOUS at 23:56

## 2021-04-04 RX ADMIN — DIPHENHYDRAMINE HYDROCHLORIDE 50 MG: 50 INJECTION, SOLUTION INTRAMUSCULAR; INTRAVENOUS at 23:58

## 2021-04-04 RX ADMIN — KETOROLAC TROMETHAMINE 15 MG: 30 INJECTION, SOLUTION INTRAMUSCULAR at 23:58

## 2021-04-04 ASSESSMENT — PAIN DESCRIPTION - LOCATION: LOCATION: HEAD

## 2021-04-04 ASSESSMENT — ENCOUNTER SYMPTOMS
BLOOD IN STOOL: 0
ABDOMINAL PAIN: 0
PHOTOPHOBIA: 1
DIARRHEA: 0
VOMITING: 0
NAUSEA: 1
SORE THROAT: 0
WHEEZING: 0
EYE DISCHARGE: 0
SHORTNESS OF BREATH: 0
EYE PAIN: 0

## 2021-04-04 ASSESSMENT — PAIN SCALES - GENERAL: PAINLEVEL_OUTOF10: 10

## 2021-04-05 VITALS
HEIGHT: 63 IN | SYSTOLIC BLOOD PRESSURE: 117 MMHG | TEMPERATURE: 98 F | WEIGHT: 205 LBS | RESPIRATION RATE: 16 BRPM | OXYGEN SATURATION: 98 % | HEART RATE: 67 BPM | DIASTOLIC BLOOD PRESSURE: 56 MMHG | BODY MASS INDEX: 36.32 KG/M2

## 2021-04-05 PROCEDURE — 2580000003 HC RX 258: Performed by: EMERGENCY MEDICINE

## 2021-04-05 RX ADMIN — SODIUM CHLORIDE 500 ML: 9 INJECTION, SOLUTION INTRAVENOUS at 00:11

## 2021-04-05 ASSESSMENT — PAIN DESCRIPTION - DESCRIPTORS: DESCRIPTORS: DULL;HEADACHE

## 2021-04-05 NOTE — ED PROVIDER NOTES
(EXCEDRIN MIGRAINE) 250-250-65 MG per tablet Take 1 tablet by mouth every 6 hours as needed for HeadachesHistorical Med      levocetirizine (XYZAL) 5 MG tablet Historical Med      Escitalopram Oxalate (LEXAPRO PO) Take by mouth             ALLERGIES     is allergic to penicillins. FAMILY HISTORY     has no family status information on file. family history is not on file. SOCIAL HISTORY      reports that she has been smoking cigarettes. She has been smoking about 0.50 packs per day. She has never used smokeless tobacco. She reports current alcohol use. She reports that she does not use drugs. PHYSICAL EXAM     INITIAL VITALS:  height is 5' 3\" (1.6 m) and weight is 205 lb (93 kg). Her oral temperature is 98 °F (36.7 °C). Her blood pressure is 117/56 (abnormal) and her pulse is 67. Her respiration is 16 and oxygen saturation is 98%. Physical Exam   Constitutional: She is oriented to person, place, and time. She appears well-developed and well-nourished. She appears distressed. She is awake alert, nontoxic-appearing she looks slightly uncomfortable sitting in a dark room   HENT:   Head: Atraumatic. Mouth/Throat: Oropharynx is clear and moist.   Eyes: Pupils are equal, round, and reactive to light. Conjunctivae are normal.   Neck: Neck supple. No JVD present. No tracheal deviation present. No thyromegaly present. Cardiovascular: Normal rate and regular rhythm. Exam reveals no gallop and no friction rub. No murmur heard. Pulmonary/Chest: Effort normal and breath sounds normal.   Abdominal: Soft. Bowel sounds are normal. There is no abdominal tenderness. Musculoskeletal:         General: No tenderness or edema. Neurological: She is alert and oriented to person, place, and time. She has normal strength. No cranial nerve deficit. Coordination normal.   Psychiatric: She has a normal mood and affect. Nursing note and vitals reviewed.           DIAGNOSTIC RESULTS       LABS:   Labs Reviewed - No data to display    EMERGENCY DEPARTMENT COURSE:   Vitals:    Vitals:    04/04/21 2332 04/05/21 0015 04/05/21 0057 04/05/21 0107   BP: 139/67 (!) 135/59 (!) 117/56    Pulse: 74 68 67    Resp: 16 16 16    Temp: 97.3 °F (36.3 °C)   98 °F (36.7 °C)   TempSrc: Oral   Oral   SpO2: 98% 95% 97% 98%   Weight: 205 lb (93 kg)      Height: 5' 3\" (1.6 m)        She received normal saline 500 bolus IV, Phenergan IM, matrix subcu, Toradol and Benadryl IV. Reevaluation and 0035: She was resting comfortably. She said that her pain is much better and almost resolved. I discussed with her the diagnosis and discharge instructions. She demonstrated understanding. FINAL IMPRESSION      1. Migraine without aura and without status migrainosus, not intractable          DISPOSITION/PLAN   Discharged home in good condition.     PATIENT REFERRED TO:  FAISAL Shaikh - [facility]  [address] [facility]  [phone]    In 2 days        DISCHARGE MEDICATIONS:  Discharge Medication List as of 4/5/2021  1:03 AM          (Please note that portions of this note were completed with a voice recognition program.  Efforts were made to edit the dictations but occasionally words are mis-transcribed.)    MD Shakeel Garcia MD  04/05/21 9239

## 2021-04-05 NOTE — ED NOTES
Observed patient sitting up in bed, eyes open, resp easy. Patient stable discharge instructions provided. Patient educated on pain control, diet, signs and symptoms, and follow up. Patient verbalized understanding, questions denied. Observed patient steadily ambulate from the department with the significant after discharge.       Godfrey Culp RN  04/05/21 8308

## 2021-04-05 NOTE — ED TRIAGE NOTES
Patient reports, \"headache woke her up. I took Excedrin 2 tabs at 2230. Headache hurting in the front of the head and the neck is hurting on the right side. I have had migraines before usually the Excedrin helps. I am nauseated also. \" lights dimmed for comfort.

## 2021-09-07 PROBLEM — E03.9 HYPOTHYROIDISM: Status: ACTIVE | Noted: 2021-09-07

## 2021-09-07 PROBLEM — F17.200 SMOKER: Status: ACTIVE | Noted: 2021-09-07

## 2021-09-07 PROBLEM — E55.9 VITAMIN D DEFICIENCY: Status: ACTIVE | Noted: 2021-09-07

## 2021-09-07 PROBLEM — F33.42 RECURRENT MAJOR DEPRESSIVE DISORDER, IN FULL REMISSION (HCC): Status: ACTIVE | Noted: 2021-09-07

## 2021-09-07 PROBLEM — F41.9 ANXIETY: Status: ACTIVE | Noted: 2021-09-07

## 2021-09-07 PROBLEM — B35.1 FUNGAL INFECTION OF TOENAIL: Status: ACTIVE | Noted: 2021-09-07

## 2021-09-11 ENCOUNTER — HOSPITAL ENCOUNTER (OUTPATIENT)
Age: 56
Discharge: HOME | End: 2021-09-11
Payer: COMMERCIAL

## 2021-09-11 DIAGNOSIS — E03.9 HYPOTHYROIDISM, UNSPECIFIED TYPE: ICD-10-CM

## 2021-09-11 DIAGNOSIS — Z00.00 WELLNESS EXAMINATION: ICD-10-CM

## 2021-09-11 DIAGNOSIS — E55.9 VITAMIN D DEFICIENCY: ICD-10-CM

## 2021-09-11 LAB
ALBUMIN SERPL-MCNC: 4.8 G/DL (ref 3.5–5.1)
ALP BLD-CCNC: 60 U/L (ref 38–126)
ALT SERPL-CCNC: 14 U/L (ref 11–66)
ANION GAP SERPL CALCULATED.3IONS-SCNC: 10 MEQ/L (ref 8–16)
AST SERPL-CCNC: 16 U/L (ref 5–40)
BASOPHILS # BLD: 1.1 %
BASOPHILS ABSOLUTE: 0.1 THOU/MM3 (ref 0–0.1)
BILIRUB SERPL-MCNC: 0.3 MG/DL (ref 0.3–1.2)
BUN BLDV-MCNC: 25 MG/DL (ref 7–22)
CALCIUM SERPL-MCNC: 9.8 MG/DL (ref 8.5–10.5)
CHLORIDE BLD-SCNC: 107 MEQ/L (ref 98–111)
CHOLESTEROL, TOTAL: 194 MG/DL (ref 100–199)
CO2: 27 MEQ/L (ref 23–33)
CREAT SERPL-MCNC: 0.7 MG/DL (ref 0.4–1.2)
EOSINOPHIL # BLD: 4.3 %
EOSINOPHILS ABSOLUTE: 0.2 THOU/MM3 (ref 0–0.4)
ERYTHROCYTE [DISTWIDTH] IN BLOOD BY AUTOMATED COUNT: 13.3 % (ref 11.5–14.5)
ERYTHROCYTE [DISTWIDTH] IN BLOOD BY AUTOMATED COUNT: 48.5 FL (ref 35–45)
GFR SERPL CREATININE-BSD FRML MDRD: 87 ML/MIN/1.73M2
GLUCOSE BLD-MCNC: 107 MG/DL (ref 70–108)
HCT VFR BLD CALC: 47.2 % (ref 37–47)
HDLC SERPL-MCNC: 39 MG/DL
HEMOGLOBIN: 15.2 GM/DL (ref 12–16)
IMMATURE GRANS (ABS): 0.01 THOU/MM3 (ref 0–0.07)
IMMATURE GRANULOCYTES: 0.2 %
LDL CHOLESTEROL CALCULATED: 122 MG/DL
LYMPHOCYTES # BLD: 25.2 %
LYMPHOCYTES ABSOLUTE: 1.4 THOU/MM3 (ref 1–4.8)
MCH RBC QN AUTO: 31.9 PG (ref 26–33)
MCHC RBC AUTO-ENTMCNC: 32.2 GM/DL (ref 32.2–35.5)
MCV RBC AUTO: 99 FL (ref 81–99)
MONOCYTES # BLD: 7.6 %
MONOCYTES ABSOLUTE: 0.4 THOU/MM3 (ref 0.4–1.3)
NUCLEATED RED BLOOD CELLS: 0 /100 WBC
PLATELET # BLD: 221 THOU/MM3 (ref 130–400)
PMV BLD AUTO: 11.8 FL (ref 9.4–12.4)
POTASSIUM SERPL-SCNC: 5.1 MEQ/L (ref 3.5–5.2)
RBC # BLD: 4.77 MILL/MM3 (ref 4.2–5.4)
SEG NEUTROPHILS: 61.6 %
SEGMENTED NEUTROPHILS ABSOLUTE COUNT: 3.4 THOU/MM3 (ref 1.8–7.7)
SODIUM BLD-SCNC: 144 MEQ/L (ref 135–145)
TOTAL PROTEIN: 7 G/DL (ref 6.1–8)
TRIGL SERPL-MCNC: 166 MG/DL (ref 0–199)
TSH SERPL DL<=0.05 MIU/L-ACNC: 1.34 UIU/ML (ref 0.4–4.2)
VITAMIN D 25-HYDROXY: 38 NG/ML (ref 30–100)
WBC # BLD: 5.6 THOU/MM3 (ref 4.8–10.8)

## 2021-09-11 PROCEDURE — 85025 COMPLETE CBC W/AUTO DIFF WBC: CPT

## 2021-09-11 PROCEDURE — 84443 ASSAY THYROID STIM HORMONE: CPT

## 2021-09-11 PROCEDURE — 80061 LIPID PANEL: CPT

## 2021-09-11 PROCEDURE — 80053 COMPREHEN METABOLIC PANEL: CPT

## 2021-09-11 PROCEDURE — 82306 VITAMIN D 25 HYDROXY: CPT

## 2021-09-11 PROCEDURE — 36415 COLL VENOUS BLD VENIPUNCTURE: CPT

## 2021-09-20 ENCOUNTER — HOSPITAL ENCOUNTER (OUTPATIENT)
Dept: MAMMOGRAPHY | Age: 56
Discharge: HOME | End: 2021-09-20
Payer: COMMERCIAL

## 2021-09-20 DIAGNOSIS — Z12.31 ENCOUNTER FOR SCREENING MAMMOGRAM FOR BREAST CANCER: ICD-10-CM

## 2021-09-20 PROCEDURE — 77063 BREAST TOMOSYNTHESIS BI: CPT

## 2021-10-19 ENCOUNTER — APPOINTMENT (OUTPATIENT)
Dept: CT IMAGING | Age: 56
End: 2021-10-19
Payer: COMMERCIAL

## 2021-10-19 ENCOUNTER — HOSPITAL ENCOUNTER (EMERGENCY)
Age: 56
Discharge: HOME | End: 2021-10-19
Attending: EMERGENCY MEDICINE
Payer: COMMERCIAL

## 2021-10-19 VITALS
RESPIRATION RATE: 16 BRPM | BODY MASS INDEX: 35.08 KG/M2 | SYSTOLIC BLOOD PRESSURE: 124 MMHG | HEIGHT: 63 IN | WEIGHT: 198 LBS | TEMPERATURE: 98.3 F | OXYGEN SATURATION: 97 % | HEART RATE: 68 BPM | DIASTOLIC BLOOD PRESSURE: 56 MMHG

## 2021-10-19 DIAGNOSIS — E27.8 ADRENAL NODULE (HCC): ICD-10-CM

## 2021-10-19 DIAGNOSIS — S29.011A INTERCOSTAL MUSCLE STRAIN, INITIAL ENCOUNTER: Primary | ICD-10-CM

## 2021-10-19 LAB
ALBUMIN SERPL-MCNC: 4.5 GM/DL (ref 3.4–5)
ALP BLD-CCNC: 59 U/L (ref 46–116)
ALT SERPL-CCNC: 28 U/L (ref 14–63)
AMORPHOUS: ABNORMAL
ANION GAP: 10 MEQ/L (ref 8–16)
AST SERPL-CCNC: 17 U/L (ref 15–37)
BACTERIA: ABNORMAL
BASOPHILS # BLD: 1.2 % (ref 0–3)
BILIRUB SERPL-MCNC: 0.5 MG/DL (ref 0.2–1)
BILIRUBIN URINE: NEGATIVE
BLOOD, URINE: NEGATIVE
BUN BLDV-MCNC: 22 MG/DL (ref 7–18)
CASTS UA: ABNORMAL /LPF
CHARACTER, URINE: CLEAR
CHLORIDE BLD-SCNC: 103 MEQ/L (ref 98–107)
CO2: 29 MEQ/L (ref 21–32)
COLOR: ABNORMAL
CREAT SERPL-MCNC: 0.8 MG/DL (ref 0.6–1.3)
CRYSTALS, UA: ABNORMAL
EOSINOPHILS RELATIVE PERCENT: 3.1 % (ref 0–4)
EPITHELIAL CELLS, UA: ABNORMAL /HPF
GFR, ESTIMATED: 79 ML/MIN/1.73M2
GLUCOSE BLD-MCNC: 90 MG/DL (ref 74–106)
GLUCOSE, URINE: NEGATIVE MG/DL
HCT VFR BLD CALC: 44.5 % (ref 37–47)
HEMOGLOBIN: 14.9 GM/DL (ref 12–16)
KETONES, URINE: NEGATIVE
LEUKOCYTE ESTERASE, URINE: ABNORMAL
LIPASE: 224 U/L (ref 73–393)
LYMPHOCYTES # BLD: 36.4 % (ref 15–47)
MCH RBC QN AUTO: 31.4 PG (ref 27–31)
MCHC RBC AUTO-ENTMCNC: 33.6 GM/DL (ref 33–37)
MCV RBC AUTO: 93.6 FL (ref 81–99)
MONOCYTES: 8.3 % (ref 0–12)
MUCUS: ABNORMAL
NITRITE, URINE: NEGATIVE
PDW BLD-RTO: 13.5 % (ref 11.5–14.5)
PH UA: 7 (ref 5–9)
PLATELET # BLD: 237 THOU/MM3 (ref 130–400)
PMV BLD AUTO: 9 FL (ref 7.4–10.4)
POC CALCIUM: 9.5 MG/DL (ref 8.5–10.1)
POTASSIUM SERPL-SCNC: 4.1 MEQ/L (ref 3.5–5.1)
PROTEIN UA: NEGATIVE MG/DL
RBC # BLD: 4.75 MILL/MM3 (ref 4.2–5.4)
RBC UA: ABNORMAL /HPF
REFLEX TO URINE C & S: ABNORMAL
SEGS: 51 % (ref 43–75)
SODIUM BLD-SCNC: 142 MEQ/L (ref 136–145)
SPECIFIC GRAVITY UA: 1.01 (ref 1–1.03)
TOTAL PROTEIN: 7.6 GM/DL (ref 6.4–8.2)
UROBILINOGEN, URINE: 0.2 EU/DL (ref 0–1)
WBC # BLD: 7.4 THOU/MM3 (ref 4.8–10.8)
WBC UA: ABNORMAL /HPF

## 2021-10-19 PROCEDURE — 36415 COLL VENOUS BLD VENIPUNCTURE: CPT

## 2021-10-19 PROCEDURE — 74176 CT ABD & PELVIS W/O CONTRAST: CPT

## 2021-10-19 PROCEDURE — 81001 URINALYSIS AUTO W/SCOPE: CPT

## 2021-10-19 PROCEDURE — 80053 COMPREHEN METABOLIC PANEL: CPT

## 2021-10-19 PROCEDURE — 6360000002 HC RX W HCPCS: Performed by: EMERGENCY MEDICINE

## 2021-10-19 PROCEDURE — 96374 THER/PROPH/DIAG INJ IV PUSH: CPT

## 2021-10-19 PROCEDURE — 83690 ASSAY OF LIPASE: CPT

## 2021-10-19 PROCEDURE — 85025 COMPLETE CBC W/AUTO DIFF WBC: CPT

## 2021-10-19 PROCEDURE — 99284 EMERGENCY DEPT VISIT MOD MDM: CPT

## 2021-10-19 PROCEDURE — 96375 TX/PRO/DX INJ NEW DRUG ADDON: CPT

## 2021-10-19 PROCEDURE — 6370000000 HC RX 637 (ALT 250 FOR IP): Performed by: EMERGENCY MEDICINE

## 2021-10-19 RX ORDER — CYCLOBENZAPRINE HCL 10 MG
10 TABLET ORAL ONCE
Status: COMPLETED | OUTPATIENT
Start: 2021-10-19 | End: 2021-10-19

## 2021-10-19 RX ORDER — CYCLOBENZAPRINE HCL 10 MG
10 TABLET ORAL 3 TIMES DAILY PRN
Qty: 30 TABLET | Refills: 0 | Status: SHIPPED | OUTPATIENT
Start: 2021-10-19 | End: 2021-10-29

## 2021-10-19 RX ORDER — KETOROLAC TROMETHAMINE 30 MG/ML
30 INJECTION, SOLUTION INTRAMUSCULAR; INTRAVENOUS ONCE
Status: COMPLETED | OUTPATIENT
Start: 2021-10-19 | End: 2021-10-19

## 2021-10-19 RX ORDER — DICLOFENAC SODIUM 75 MG/1
75 TABLET, DELAYED RELEASE ORAL 2 TIMES DAILY
Qty: 30 TABLET | Refills: 0 | Status: SHIPPED | OUTPATIENT
Start: 2021-10-19 | End: 2021-10-20 | Stop reason: SDUPTHER

## 2021-10-19 RX ORDER — ONDANSETRON 2 MG/ML
4 INJECTION INTRAMUSCULAR; INTRAVENOUS ONCE
Status: COMPLETED | OUTPATIENT
Start: 2021-10-19 | End: 2021-10-19

## 2021-10-19 RX ADMIN — CYCLOBENZAPRINE 10 MG: 10 TABLET, FILM COATED ORAL at 20:25

## 2021-10-19 RX ADMIN — Medication: at 20:26

## 2021-10-19 RX ADMIN — KETOROLAC TROMETHAMINE 30 MG: 30 INJECTION, SOLUTION INTRAMUSCULAR; INTRAVENOUS at 17:38

## 2021-10-19 RX ADMIN — ONDANSETRON 4 MG: 2 INJECTION INTRAMUSCULAR; INTRAVENOUS at 17:39

## 2021-10-19 ASSESSMENT — PAIN DESCRIPTION - LOCATION: LOCATION: FLANK

## 2021-10-19 ASSESSMENT — PAIN DESCRIPTION - DESCRIPTORS: DESCRIPTORS: STABBING

## 2021-10-19 ASSESSMENT — PAIN SCALES - GENERAL
PAINLEVEL_OUTOF10: 0
PAINLEVEL_OUTOF10: 3

## 2021-10-19 ASSESSMENT — ENCOUNTER SYMPTOMS
SHORTNESS OF BREATH: 0
WHEEZING: 0
COUGH: 0
NAUSEA: 0

## 2021-10-19 ASSESSMENT — PAIN DESCRIPTION - PAIN TYPE: TYPE: ACUTE PAIN

## 2021-10-19 ASSESSMENT — PAIN DESCRIPTION - ORIENTATION: ORIENTATION: RIGHT

## 2021-10-19 ASSESSMENT — PAIN DESCRIPTION - FREQUENCY: FREQUENCY: INTERMITTENT

## 2021-10-19 NOTE — Clinical Note
Ricci Oliva was seen and treated in our emergency department on 10/19/2021. She may return to work on 10/22/2021. If you have any questions or concerns, please don't hesitate to call.       Donte Skinner MD

## 2021-10-19 NOTE — ED NOTES
Zero pain while lying in bed. Unless she moves a certain way then some sharp pain.       Loren Nicolas RN  10/19/21 4359

## 2021-10-20 NOTE — ED PROVIDER NOTES
[facility]  eMERGENCY dEPARTMENT eNCOUnter             Brady Peoples 21 COMPLAINT    Chief Complaint   Patient presents with    Flank Pain     right flank pain, hurts worse with movement. Nurses Notes reviewed and I agree except as noted in the HPI. HPI    Kimberly Hutchison is a 49 y.o. female who presents pain that for 1 week she has had pain in her right flank area, including the lower lateral ribs, going to the front into the back. She knows of no specific injury. She does a job that requires repetitive movement. When she does her job, the pain bothers her more when she twists. Today, it is hurting worse, with \"muscle spasms \"when she changes position. Over-the-counter medication is not helping much. Her pain is worse with deep breathing. She denies any nausea or change in appetite. The pain is not worse with eating. She has no dysuria or fever. Current pain is 6/10 with movement, 1/10 if she sits still. REVIEW OF SYSTEMS      Review of Systems   Constitutional: Negative for fever. HENT: Negative for congestion. Respiratory: Negative for cough, shortness of breath and wheezing. Cardiovascular: Negative for palpitations and leg swelling. Gastrointestinal: Negative for nausea. Genitourinary: Negative for dysuria and hematuria. Musculoskeletal: Negative for falls. Skin: Negative for rash. Neurological: Negative for sensory change and weakness. All other systems reviewed and are negative. PAST MEDICAL HISTORY     has a past medical history of Headache(784.0). SURGICAL HISTORY     has a past surgical history that includes  section; Endometrial ablation; Carpal tunnel release (Right); Ankle Fusion; and Tooth Extraction (2020).     CURRENT MEDICATIONS    Discharge Medication List as of 10/19/2021  8:12 PM      CONTINUE these medications which have NOT CHANGED    Details   levothyroxine (SYNTHROID) 50 MCG tablet Take 50 mcg by mouth DailyHistorical Med      diphenhydrAMINE HCl (BENADRYL ALLERGY PO) Take by mouthHistorical Med      aspirin-acetaminophen-caffeine (EXCEDRIN MIGRAINE) 250-250-65 MG per tablet Take 1 tablet by mouth every 6 hours as needed for HeadachesHistorical Med      Escitalopram Oxalate (LEXAPRO PO) Take 20 mg by mouth Historical Med             ALLERGIES    is allergic to augmentin [amoxicillin-pot clavulanate], chantix [varenicline], and penicillins. FAMILY HISTORY    has no family status information on file. family history is not on file. SOCIAL HISTORY     reports that she has been smoking cigarettes. She started smoking about 16 years ago. She has a 15.00 pack-year smoking history. She has never used smokeless tobacco. She reports current alcohol use. She reports that she does not use drugs. PHYSICAL EXAM       INITIAL VITALS: BP (!) 124/56   Pulse 68   Temp 98.3 °F (36.8 °C)   Resp 16   Ht 5' 3\" (1.6 m)   Wt 198 lb (89.8 kg)   SpO2 97%   BMI 35.07 kg/m²        Physical Exam  Vitals and nursing note reviewed. Exam conducted with a chaperone present. Constitutional:       General: She is in acute distress. HENT:      Nose: Nose normal.      Mouth/Throat:      Mouth: Mucous membranes are moist.   Eyes:      Pupils: Pupils are equal, round, and reactive to light. Cardiovascular:      Rate and Rhythm: Normal rate and regular rhythm. Heart sounds: No murmur heard. Pulmonary:      Effort: Pulmonary effort is normal. No respiratory distress. Breath sounds: Normal breath sounds. Chest:      Chest wall: Tenderness (Right lateral, inferior, posterior, and anterior chest wall. No step-off, deformity, crepitus.) present. Abdominal:      General: Bowel sounds are normal.      Palpations: Abdomen is soft. Tenderness: There is abdominal tenderness (Right upper quadrant, right CVA). Musculoskeletal:         General: No swelling or tenderness.       Cervical back: Neck supple. Lymphadenopathy:      Cervical: No cervical adenopathy. Skin:     General: Skin is warm and dry. Findings: No erythema or rash. Neurological:      Mental Status: She is alert and oriented to person, place, and time. Psychiatric:         Behavior: Behavior normal.           RADIOLOGY:      CT ABDOMEN PELVIS WO CONTRAST Additional Contrast? None   Final Result   1. No nephrolithiasis, ureterolithiasis, hydronephrosis or hydroureter is seen. No acute intra-abdominal or pelvic findings. 2. There is a right adrenal gland nodular lesion demonstrated. This measures 2.5 cm in transverse dimension and 7 Hounsfield units. This could represent a possible lipid rich adenoma. However this is incompletely characterized and can be further    evaluated with routine nonemergent adrenal lesion protocol CT imaging. **This report has been created using voice recognition software. It may contain minor errors which are inherent in voice recognition technology. **      Final report electronically signed by Dr. Meenu Gustafson on 10/19/2021 7:48 PM           LABS:     Labs Reviewed   URINE RT REFLEX TO CULTURE - Abnormal; Notable for the following components:       Result Value    Leukocyte Esterase, Urine TRACE (*)     All other components within normal limits   CBC WITH AUTO DIFFERENTIAL - Abnormal; Notable for the following components:    MCH 31.4 (*)     All other components within normal limits   COMPREHENSIVE METABOLIC PANEL - Abnormal; Notable for the following components:    BUN 22 (*)     All other components within normal limits   GLOMERULAR FILTRATION RATE, ESTIMATED - Abnormal; Notable for the following components:    GFR, Estimated 79 (*)     All other components within normal limits   LIPASE   ANION GAP       Vitals:    Vitals:    10/19/21 1701 10/19/21 1812 10/19/21 2030   BP: 137/82 106/61 (!) 124/56   Pulse: 79 66 68   Resp: 16 16 16   Temp: 98.3 °F (36.8 °C)     SpO2: 98% 96% 97%   Weight: 198 lb (89.8 kg)     Height: 5' 3\" (1.6 m)         EMERGENCY DEPARTMENT COURSE:    Some better with Toradol and Zofran, still hurts with movement. Test results and plan of care discussed. She was notified of her adrenal nodule, which I do not believe has anything to do with her pain. FINAL IMPRESSION      1. Intercostal muscle strain, initial encounter    2. Adrenal nodule [facility])        DISPOSITION/PLAN    DISPOSITION Decision To Discharge 10/19/2021 08:06:14 PM      PATIENT REFERRED TO:    Vicenta Shah, APRN - [facility]  [address] [facility]  [phone]    Schedule an appointment as soon as possible for a visit         DISCHARGE MEDICATIONS:    Discharge Medication List as of 10/19/2021  8:12 PM      START taking these medications    Details   cyclobenzaprine (FLEXERIL) 10 MG tablet Take 1 tablet by mouth 3 times daily as needed for Muscle spasms, Disp-30 tablet, R-0Normal      diclofenac (VOLTAREN) 75 MG EC tablet Take 1 tablet by mouth 2 times daily For pain and inflammation. , Disp-30 tablet, R-0Normal               (Please note that portions of this note were completed with a voice recognition program.  Efforts were made to edit the dictations but occasionally words are mis-transcribed.)      Shahnaz Painting MD  10/19/21 2680

## 2021-10-20 NOTE — ED NOTES
Pain 3. States feels better. Pt released ambulatory in stable condition. Resp easy, non-labored. Skin warm, dry. Color pink. AVS and scripts reviewed. Pt voiced understanding.      Shar Hector RN  10/19/21 9997

## 2021-10-22 ENCOUNTER — HOSPITAL ENCOUNTER (OUTPATIENT)
Dept: CT IMAGING | Age: 56
Discharge: HOME | End: 2021-10-22
Payer: COMMERCIAL

## 2021-10-22 DIAGNOSIS — E27.8 ADRENAL NODULE (HCC): ICD-10-CM

## 2021-10-22 PROCEDURE — 6360000004 HC RX CONTRAST MEDICATION: Performed by: NURSE PRACTITIONER

## 2021-10-22 PROCEDURE — 74170 CT ABD WO CNTRST FLWD CNTRST: CPT

## 2021-10-22 RX ADMIN — IOPAMIDOL 100 ML: 755 INJECTION, SOLUTION INTRAVENOUS at 12:25

## 2021-10-28 NOTE — PROGRESS NOTES
[address] [facility] [id number]  Dept: [phone]  Loc: [phone]      Ms. Rubén Kingsley was seen in follow up for   Chief Complaint   Patient presents with    New Patient     adrenal nodule         HPI:    Ms. Rubén Kingsley is a 60-year-old female with a two-week history of right flank pain. She presented to [facility] on 10/19/21 for further evaluation. She reported spasm-like pain located in the right upper abdomen with radiation to the right flank and lateral ribs. Aggravating factors included twisting and deep breathing. She reports that she performs repetitive movement at work but is not aware of any specific trauma. A CT scan of the abdomen and pelvis was performed demonstrating a 2.5 cm right adrenal gland nodule thought to represent a lipid rich adenoma. There was no hydroureteronephrosis or nephrolithiasis seen. She reports that the day before her pain started, she was hauling water in large buckets and rotating right to left. She was started on a muscle-relaxant, which did offer some improvement in her symptoms. She reports mild, intermittent urinary urgency. She denies any dysuria, gross hematuria, urinary frequency, weakened stream, urinary retention, fever/chills, nausea/vomiting, or bowel irregularity. She presents today for further evaluation. Past Medical History:   Diagnosis Date    Headache(784.0)        Past Surgical History:   Procedure Laterality Date    ANKLE FUSION      CARPAL TUNNEL RELEASE Right      SECTION      ENDOMETRIAL ABLATION      TOOTH EXTRACTION  2020       Current Outpatient Medications on File Prior to Visit   Medication Sig Dispense Refill    diclofenac (VOLTAREN) 75 MG EC tablet Take 1 tablet by mouth 2 times daily For pain and inflammation.  30 tablet 0    levothyroxine (SYNTHROID) 50 MCG tablet Take 50 mcg by mouth Daily      diphenhydrAMINE HCl (BENADRYL ALLERGY PO) Take by mouth      aspirin-acetaminophen-caffeine (EXCEDRIN MIGRAINE) 250-250-65 MG per tablet Take 1 tablet by mouth every 6 hours as needed for Headaches      Escitalopram Oxalate (LEXAPRO PO) Take 20 mg by mouth        No current facility-administered medications on file prior to visit. Allergies   Allergen Reactions    Augmentin [Amoxicillin-Pot Clavulanate]      GI upset    Chantix [Varenicline]      Nausea, sweats, chills    Penicillins Rash       No family history on file. Social History     Socioeconomic History    Marital status:      Spouse name: Not on file    Number of children: Not on file    Years of education: Not on file    Highest education level: Not on file   Occupational History    Not on file   Tobacco Use    Smoking status: Current Every Day Smoker     Packs/day: 1.00     Years: 15.00     Pack years: 15.00     Types: Cigarettes     Start date: 1/1/2005    Smokeless tobacco: Never Used   Vaping Use    Vaping Use: Never used   Substance and Sexual Activity    Alcohol use: Yes     Comment: on occasion    Drug use: No    Sexual activity: Not on file   Other Topics Concern    Not on file   Social History Narrative    Not on file     Social Determinants of Health     Financial Resource Strain:     Difficulty of Paying Living Expenses:    Food Insecurity:     Worried About [address] in the Last Year:     [facility] in the Last Year:    Transportation Needs:     Lack of Transportation (Medical):      Lack of Transportation (Non-Medical):    Physical Activity:     Days of Exercise per Week:     Minutes of Exercise per Session:    Stress:     Feeling of Stress :    Social Connections:     Frequency of Communication with Friends and Family:     Frequency of Social Gatherings with Friends and Family:     Attends [employer] Services:     Active Member of Clubs or Organizations:     Attends Club or Organization Meetings:     Marital Status:    Intimate Partner Violence:     Fear of Current or Ex-Partner:     Emotionally Abused:     Physically Abused:     Sexually Abused:        Review of Systems  Constitutional: Negative for fatigue, fever and unexpected weight change. HENT: Negative for congestion and trouble swallowing. Eyes: Negative for pain and itching. Respiratory: Negative for cough and shortness of breath. Cardiovascular: Negative for chest pain and leg swelling. Gastrointestinal: Positive for right upper quadrant pain. No constipation, diarrhea and nausea. Endocrine: Negative for cold intolerance and heat intolerance. Genitourinary: See HPI. Musculoskeletal: Positive for right flank/upper abdominal pain. Denies other back pain and joint swelling. Skin: Negative for rash. Neurological: Negative for dizziness, weakness, numbness and headaches. Psychiatric/Behavioral: The patient is not nervous/anxious. Exam    BP (!) 133/98   Ht 5' 3\" (1.6 m)   Wt 198 lb (89.8 kg)   BMI 35.07 kg/m²     Constitutional: Oriented to person, place, and time. Vital signs are normal. Appears well-developed and well-nourished. Cooperative. No distress. HENT:    Head: Normocephalic and atraumatic. Eyes: EOM are normal. Pupils are equal, round, and reactive to light. Right eye exhibits no discharge. Left eye exhibits no discharge. No scleral icterus. Neck: Trachea normal. No JVD present. Cardiovascular: Normal rate and regular rhythm. S1/S2. Pulmonary/Chest: Effort normal. No respiratory distress. No wheezes, rhonchi, or rales. Abdominal: Soft. Exhibits mild distension. There is mild  tenderness in the right upper quadrant. There is no rebound, rigidity, or guarding and no CVA tenderness. Musculoskeletal: No pitting edema or calf tenderness. Point tenderness in the right latissimus dorsi to light palpation. Right thoracic paraspinal tenderness to light palpation. Right shoulder is higher than left shoulder.  Right shoulder is higher than the left. Right iliac spine is higher and pelvis, with rotation of the pelvic girdle. Lymphadenopathy:   Right: No supraclavicular adenopathy present. Left: No supraclavicular adenopathy present. Neurological: Alert and oriented to person, place, and time. No cranial nerve deficit. Skin: Skin is warm and dry. Not diaphoretic. Psychiatric: Normal mood and affect. Behavior is normal.   Nursing note and vitals reviewed. Labs    Results for POC orders placed in visit on 11/01/21   POCT Urinalysis No Micro (Auto)   Result Value Ref Range    Glucose, Ur Negative NEGATIVE mg/dl    Bilirubin Urine Negative     Ketones, Urine Negative NEGATIVE    Specific Gravity, Urine 1.010 1.002 - 1.030    Blood, UA POC Negative NEGATIVE    pH, Urine 5.50 5.0 - 9.0    Protein, Urine Negative NEGATIVE mg/dl    Urobilinogen, Urine 0.20 0.0 - 1.0 eu/dl    Nitrite, Urine Negative NEGATIVE    Leukocyte Clumps, Urine Negative NEGATIVE    Color, Urine Yellow [id number]    Character, Urine Clear [id number]       Lab Results   Component Value Date    CREATININE 0.8 10/19/2021    BUN 22 (H) 10/19/2021     10/19/2021    K 4.1 10/19/2021     10/19/2021    CO2 29 10/19/2021       Radiology:     CT abdomen with and without contrast (10/22/21)    FINDINGS:   Belem Schmid is again noted to be a hypodense nodule at the right adrenal gland measuring 2.9 cm in greatest axial dimension, unchanged compared to prior exam. This has an average Hounsfield density of negative 4 on precontrast sequence, 62 on portal venous    phase and 19 on delayed phase images for 65% washout. The left adrenal gland is unremarkable.       Visualized portions of the liver are unremarkable. The gallbladder is unremarkable. The kidneys are normal in appearance. The spleen and pancreas are unremarkable. Visualized portions of the bowel are unremarkable.  Visualized osseous structures are also    unremarkable.           Impression    2.9 cm right adrenal adenoma. Assessment/Plan:    1. Right Adrenal Adenoma- CT abdomen with and without contrast obtained on 10/22/21 demonstrates a 2.9 cm hypodense nodule of the right adrenal gland with characteristics of a right adrenal adenoma. Unenhanced CT attenuation <10 HU and CT contrast washout >50% at 10 minutes, indicating a benign process at this time. She has no evidence of hormone hypersecretion. Will plan to repeat CT abdomen and pelvis (adrenal protocol) in one year. If progression noted (>4 cm in size, growth >1 cm, or development of hormonal activity), surgery will be necessary. 2. Right Flank Pain- Improvement noted. Discontinue Voltaren. Start Toradol. Continue Flexeril. Physical exam demonstrates musculoskeletal etiology for pain. Recommend massage/physical therapy/chiropractor for alignment issues. Two weeks follow-up to assess status.

## 2021-11-01 ENCOUNTER — OFFICE VISIT (OUTPATIENT)
Dept: UROLOGY | Age: 56
End: 2021-11-01
Payer: COMMERCIAL

## 2021-11-01 VITALS
HEIGHT: 63 IN | DIASTOLIC BLOOD PRESSURE: 98 MMHG | WEIGHT: 198 LBS | BODY MASS INDEX: 35.08 KG/M2 | SYSTOLIC BLOOD PRESSURE: 133 MMHG

## 2021-11-01 DIAGNOSIS — E27.8 ADRENAL NODULE (HCC): Primary | ICD-10-CM

## 2021-11-01 DIAGNOSIS — R10.9 FLANK PAIN: ICD-10-CM

## 2021-11-01 LAB
BILIRUBIN URINE: NEGATIVE
BLOOD URINE, POC: NEGATIVE
CHARACTER, URINE: CLEAR
COLOR, URINE: YELLOW
GLUCOSE URINE: NEGATIVE MG/DL
KETONES, URINE: NEGATIVE
LEUKOCYTE CLUMPS, URINE: NEGATIVE
NITRITE, URINE: NEGATIVE
PH, URINE: 5.5 (ref 5–9)
PROTEIN, URINE: NEGATIVE MG/DL
SPECIFIC GRAVITY, URINE: 1.01 (ref 1–1.03)
UROBILINOGEN, URINE: 0.2 EU/DL (ref 0–1)

## 2021-11-01 PROCEDURE — 99204 OFFICE O/P NEW MOD 45 MIN: CPT | Performed by: PHYSICIAN ASSISTANT

## 2021-11-01 PROCEDURE — 81003 URINALYSIS AUTO W/O SCOPE: CPT | Performed by: PHYSICIAN ASSISTANT

## 2021-11-01 RX ORDER — KETOROLAC TROMETHAMINE 10 MG/1
10 TABLET, FILM COATED ORAL EVERY 6 HOURS PRN
Qty: 20 TABLET | Refills: 0 | Status: SHIPPED | OUTPATIENT
Start: 2021-11-01

## 2021-11-01 NOTE — PATIENT INSTRUCTIONS
Start Toradol. Discontinue Diclofenac and Aleve. Continue Flexeril while not at working. Please make appointment for adjustment with Chiropractor.

## 2022-09-13 ENCOUNTER — HOSPITAL ENCOUNTER (OUTPATIENT)
Age: 57
Discharge: HOME | End: 2022-09-13
Payer: COMMERCIAL

## 2022-09-13 ENCOUNTER — HOSPITAL ENCOUNTER (OUTPATIENT)
Dept: GENERAL RADIOLOGY | Age: 57
Discharge: HOME | End: 2022-09-13
Payer: COMMERCIAL

## 2022-09-13 DIAGNOSIS — M79.672 LEFT FOOT PAIN: ICD-10-CM

## 2022-09-13 PROCEDURE — 73630 X-RAY EXAM OF FOOT: CPT

## 2022-11-01 ENCOUNTER — APPOINTMENT (OUTPATIENT)
Dept: GENERAL RADIOLOGY | Age: 57
End: 2022-11-01
Payer: COMMERCIAL

## 2022-11-01 ENCOUNTER — HOSPITAL ENCOUNTER (EMERGENCY)
Age: 57
Discharge: HOME | End: 2022-11-01
Attending: EMERGENCY MEDICINE
Payer: COMMERCIAL

## 2022-11-01 VITALS
TEMPERATURE: 97.7 F | DIASTOLIC BLOOD PRESSURE: 65 MMHG | SYSTOLIC BLOOD PRESSURE: 139 MMHG | OXYGEN SATURATION: 98 % | RESPIRATION RATE: 16 BRPM | HEART RATE: 86 BPM

## 2022-11-01 DIAGNOSIS — M54.40 BILATERAL LOW BACK PAIN WITH SCIATICA, SCIATICA LATERALITY UNSPECIFIED, UNSPECIFIED CHRONICITY: Primary | ICD-10-CM

## 2022-11-01 DIAGNOSIS — M25.561 ACUTE PAIN OF BOTH KNEES: ICD-10-CM

## 2022-11-01 DIAGNOSIS — M25.562 ACUTE PAIN OF BOTH KNEES: ICD-10-CM

## 2022-11-01 PROCEDURE — 99283 EMERGENCY DEPT VISIT LOW MDM: CPT

## 2022-11-01 PROCEDURE — 73564 X-RAY EXAM KNEE 4 OR MORE: CPT

## 2022-11-01 PROCEDURE — 72100 X-RAY EXAM L-S SPINE 2/3 VWS: CPT

## 2022-11-01 RX ORDER — HYDROCODONE BITARTRATE AND ACETAMINOPHEN 5; 325 MG/1; MG/1
1 TABLET ORAL EVERY 6 HOURS PRN
Qty: 10 TABLET | Refills: 0 | Status: SHIPPED | OUTPATIENT
Start: 2022-11-01 | End: 2022-11-04

## 2022-11-01 RX ORDER — CYCLOBENZAPRINE HCL 10 MG
10 TABLET ORAL 3 TIMES DAILY PRN
Qty: 30 TABLET | Refills: 0 | Status: SHIPPED | OUTPATIENT
Start: 2022-11-01 | End: 2022-11-11

## 2022-11-01 RX ORDER — DICLOFENAC SODIUM 75 MG/1
75 TABLET, DELAYED RELEASE ORAL 2 TIMES DAILY
Qty: 30 TABLET | Refills: 0 | Status: SHIPPED | OUTPATIENT
Start: 2022-11-01

## 2022-11-01 ASSESSMENT — PAIN DESCRIPTION - LOCATION: LOCATION: BACK;KNEE

## 2022-11-01 ASSESSMENT — ENCOUNTER SYMPTOMS
BACK PAIN: 1
GASTROINTESTINAL NEGATIVE: 1
RESPIRATORY NEGATIVE: 1

## 2022-11-01 ASSESSMENT — PAIN - FUNCTIONAL ASSESSMENT: PAIN_FUNCTIONAL_ASSESSMENT: 0-10

## 2022-11-01 ASSESSMENT — PAIN DESCRIPTION - DESCRIPTORS: DESCRIPTORS: SORE;ACHING

## 2022-11-01 ASSESSMENT — PAIN SCALES - GENERAL: PAINLEVEL_OUTOF10: 9

## 2022-11-01 ASSESSMENT — PAIN DESCRIPTION - ORIENTATION: ORIENTATION: RIGHT;LEFT

## 2022-11-01 NOTE — Clinical Note
Patricia Pina was seen and treated in our emergency department on 11/1/2022. She may return to work on 11/04/2022. If you have any questions or concerns, please don't hesitate to call.       Murphy Romero MD

## 2022-11-01 NOTE — ED PROVIDER NOTES
[facility]  eMERGENCY dEPARTMENT eNCOUnter             Brady Peoples 21 COMPLAINT    Chief Complaint   Patient presents with    Knee Pain    Back Pain       Nurses Notes reviewed and I agree except as noted in the HPI. HPI    Rachele Colby is a 56 y.o. female who presents in tears because of pain in both of her knees and her low back. She has been having this for a while, and notices it gets worse when she stands or walks for a long time. She does an 8-hour day job that requires her to be on her feet all the time. Starting yesterday, the pain has been much worse. She had trouble sleeping last night because of her pain, especially in the left knee. Over-the-counter Aleve is not helping much. She has not seen her doctor about this problem. She has no known history of arthritis. She has no other affected joints. Current pain level is 9/10, aching, constant. REVIEW OF SYSTEMS      Review of Systems   Constitutional:  Positive for malaise/fatigue. Respiratory: Negative. Cardiovascular: Negative. Gastrointestinal: Negative. Musculoskeletal:  Positive for back pain, joint pain and myalgias. Negative for falls and neck pain. Neurological:  Negative for sensory change and focal weakness. All other systems reviewed and are negative. PAST MEDICAL HISTORY     has a past medical history of Headache(784.0). SURGICAL HISTORY     has a past surgical history that includes  section; Endometrial ablation; Carpal tunnel release (Right); Ankle Fusion; and Tooth Extraction (2020).     CURRENT MEDICATIONS    Discharge Medication List as of 2022  3:22 PM        CONTINUE these medications which have NOT CHANGED    Details   ketorolac (TORADOL) 10 MG tablet Take 1 tablet by mouth every 6 hours as needed for Pain, Disp-20 tablet, R-0Normal      levothyroxine (SYNTHROID) 50 MCG tablet Take 50 mcg by mouth DailyHistorical Med diphenhydrAMINE HCl (BENADRYL ALLERGY PO) Take by mouthHistorical Med      aspirin-acetaminophen-caffeine (EXCEDRIN MIGRAINE) 250-250-65 MG per tablet Take 1 tablet by mouth every 6 hours as needed for HeadachesHistorical Med      Escitalopram Oxalate (LEXAPRO PO) Take 20 mg by mouth Historical Med             ALLERGIES    is allergic to augmentin [amoxicillin-pot clavulanate], chantix [varenicline], and penicillins. FAMILY HISTORY    has no family status information on file. family history is not on file. SOCIAL HISTORY     reports that she has been smoking cigarettes. She started smoking about 17 years ago. She has a 15.00 pack-year smoking history. She has never used smokeless tobacco. She reports current alcohol use. She reports that she does not use drugs. PHYSICAL EXAM       INITIAL VITALS: /65   Pulse 86   Temp 97.7 °F (36.5 °C)   Resp 16   SpO2 98%      Physical Exam  Vitals and nursing note reviewed. Constitutional:       General: She is in acute distress. Eyes:      Pupils: Pupils are equal, round, and reactive to light. Cardiovascular:      Rate and Rhythm: Normal rate and regular rhythm. Pulses: Normal pulses. Heart sounds: No murmur heard. Pulmonary:      Effort: Pulmonary effort is normal. No respiratory distress. Breath sounds: Normal breath sounds. Musculoskeletal:         General: Tenderness present. No swelling. Cervical back: Neck supple. No tenderness. Comments: She is tender across her lower lumbar area. Range of motion of her back is fairly normal with some increase in pain with extremes of range of motion. Both knees are tender, slightly swollen, left greater than right. No erythema, heat. Range of motion of her knees is painful. No localized areas of tenderness. Some crepitus bilaterally. The knees are stable to varus and valgus stress, no anterior drawer signs. No calf tenderness. Skin:     General: Skin is warm and dry. Neurological:      General: No focal deficit present. Mental Status: She is alert and oriented to person, place, and time. Psychiatric:         Behavior: Behavior normal.          RADIOLOGY:    XR LUMBAR SPINE (2-3 VIEWS)   Final Result      1. Lumbar spondylosis at T11-12, T12-L1, L1-2, L2-3, L3-4 and L5-S1.   2. Degenerative change involving the sacroiliac joints bilaterally. **This report has been created using voice recognition software. It may contain minor errors which are inherent in voice recognition technology. **      Final report electronically signed by DR Jr Tello on 11/1/2022 2:43 PM      XR KNEE RIGHT (MIN 4 VIEWS)   Final Result   No acute fracture or dislocation involving the right knee. **This report has been created using voice recognition software. It may contain minor errors which are inherent in voice recognition technology. **      Final report electronically signed by Dr Gavino Dyson on 11/1/2022 2:35 PM      XR KNEE LEFT (MIN 4 VIEWS)   Final Result       1. Mild degenerative change. 2. Otherwise negative left knee radiographs. .               **This report has been created using voice recognition software. It may contain minor errors which are inherent in voice recognition technology. **      Final report electronically signed by DR Jr Tello on 11/1/2022 2:54 PM           Vitals:    Vitals:    11/01/22 1309   BP: 139/65   Pulse: 86   Resp: 16   Temp: 97.7 °F (36.5 °C)   SpO2: 98%       EMERGENCY DEPARTMENT COURSE:    X-ray results and general measures were discussed with the patient. She was given a Velcro brace for her left knee, which is the most painful. She states she has a walker at home. She was given a note for work. She will follow-up with her own physician. FINAL IMPRESSION      1. Bilateral low back pain with sciatica, sciatica laterality unspecified, unspecified chronicity    2.  Acute pain of both knees DISPOSITION/PLAN    DISPOSITION Decision To Discharge 11/01/2022 03:18:28 PM      PATIENT REFERRED TO:    Mohamud Ferguson, FAISAL - CNP  [address] [facility]  [phone]      As scheduled tomorrow    DISCHARGE MEDICATIONS:    Discharge Medication List as of 11/1/2022  3:22 PM        START taking these medications    Details   diclofenac (VOLTAREN) 75 MG EC tablet Take 1 tablet by mouth 2 times daily, Disp-30 tablet, R-0Normal      cyclobenzaprine (FLEXERIL) 10 MG tablet Take 1 tablet by mouth 3 times daily as needed for Muscle spasms, Disp-30 tablet, R-0Normal      HYDROcodone-acetaminophen (NORCO) 5-325 MG per tablet Take 1 tablet by mouth every 6 hours as needed for Pain for up to 3 days. Intended supply: 3 days.  Take lowest dose possible to manage pain, Disp-10 tablet, R-0Normal                (Please note that portions of this note were completed with a voice recognition program.  Efforts were made to edit the dictations but occasionally words are mis-transcribed.)       Osvaldo Lee MD  11/01/22 [id number]

## 2022-11-01 NOTE — ED NOTES
Patient in stable condition. Alert and oriented x3. Unlabored breathing present. Patient aware of plan of care. Patient discharge instructions given and explained. Follow up information instructions given. Prescription order explained to patient. Pharmacy with patient verified. Patient agreeable to plan of care. Patient states understanding and denies any questions or concerns. Patient ambulated out of ER with no complications.         Rachel Christine RN  11/01/22 0844

## 2022-11-01 NOTE — ED TRIAGE NOTES
Patient arrival to the ER with back and bilateral knee pain states the flare up started lastnight. Patient didn't sleep good due to the pain. Patient states it feels like \"they could break\". Patient is crying. Patient believes the back pain is from her knee pain and walking different then normal. Patient has had this before and believes weather changes trigger it. Patient states today is just \"a really bad day\". Patient drove herself, she left work early today. Patient is alert and oriented and breathing with ease.

## 2022-11-01 NOTE — DISCHARGE INSTRUCTIONS
Wear the brace as needed on your left knee. Rest, elevate, ice as needed for pain and inflammation. Medications as prescribed. Follow-up with your doctor.

## 2022-11-11 ENCOUNTER — HOSPITAL ENCOUNTER (OUTPATIENT)
Dept: MRI IMAGING | Age: 57
Discharge: HOME | End: 2022-11-11
Payer: COMMERCIAL

## 2022-11-11 DIAGNOSIS — M46.1 DEGENERATIVE JOINT DISEASE OF SACROILIAC JOINT (HCC): ICD-10-CM

## 2022-11-11 DIAGNOSIS — M47.816 LUMBAR SPONDYLOSIS: ICD-10-CM

## 2022-11-11 DIAGNOSIS — M54.41 ACUTE BILATERAL LOW BACK PAIN WITH BILATERAL SCIATICA: ICD-10-CM

## 2022-11-11 DIAGNOSIS — M54.42 ACUTE BILATERAL LOW BACK PAIN WITH BILATERAL SCIATICA: ICD-10-CM

## 2022-11-11 PROCEDURE — 72148 MRI LUMBAR SPINE W/O DYE: CPT

## 2023-01-20 ENCOUNTER — APPOINTMENT (OUTPATIENT)
Dept: GENERAL RADIOLOGY | Age: 58
End: 2023-01-20
Payer: COMMERCIAL

## 2023-01-20 ENCOUNTER — HOSPITAL ENCOUNTER (EMERGENCY)
Age: 58
Discharge: HOME | End: 2023-01-20
Attending: FAMILY MEDICINE
Payer: COMMERCIAL

## 2023-01-20 VITALS
RESPIRATION RATE: 19 BRPM | DIASTOLIC BLOOD PRESSURE: 81 MMHG | WEIGHT: 212 LBS | HEART RATE: 90 BPM | SYSTOLIC BLOOD PRESSURE: 135 MMHG | HEIGHT: 63 IN | TEMPERATURE: 96.9 F | OXYGEN SATURATION: 96 % | BODY MASS INDEX: 37.56 KG/M2

## 2023-01-20 DIAGNOSIS — S60.221A CONTUSION OF RIGHT HAND, INITIAL ENCOUNTER: Primary | ICD-10-CM

## 2023-01-20 PROCEDURE — 99283 EMERGENCY DEPT VISIT LOW MDM: CPT

## 2023-01-20 PROCEDURE — 73130 X-RAY EXAM OF HAND: CPT

## 2023-01-20 ASSESSMENT — PAIN - FUNCTIONAL ASSESSMENT: PAIN_FUNCTIONAL_ASSESSMENT: 0-10

## 2023-01-20 ASSESSMENT — PAIN DESCRIPTION - PAIN TYPE: TYPE: ACUTE PAIN

## 2023-01-20 ASSESSMENT — ENCOUNTER SYMPTOMS
VOMITING: 0
NAUSEA: 0
COLOR CHANGE: 1

## 2023-01-20 ASSESSMENT — LIFESTYLE VARIABLES
HOW OFTEN DO YOU HAVE A DRINK CONTAINING ALCOHOL: NEVER
HOW MANY STANDARD DRINKS CONTAINING ALCOHOL DO YOU HAVE ON A TYPICAL DAY: PATIENT DOES NOT DRINK

## 2023-01-20 ASSESSMENT — PAIN SCALES - GENERAL: PAINLEVEL_OUTOF10: 7

## 2023-01-20 ASSESSMENT — PAIN DESCRIPTION - DESCRIPTORS: DESCRIPTORS: ACHING

## 2023-01-20 ASSESSMENT — PAIN DESCRIPTION - LOCATION: LOCATION: HAND

## 2023-01-20 ASSESSMENT — PAIN DESCRIPTION - ORIENTATION: ORIENTATION: RIGHT

## 2023-01-20 NOTE — ED NOTES
ED nurse-to-nurse bedside report    Chief Complaint   Patient presents with    Work Related Injury    Hand Injury      LOC: alert and orientated to name, place, date  Vital signs   Vitals:    01/20/23 0654 01/20/23 0702   BP: 135/81    Pulse: 90 90   Resp: 19    Temp: 96.9 °F (36.1 °C)    TempSrc: Temporal    SpO2: 96%    Weight: 212 lb (96.2 kg)    Height: 5' 3\" (1.6 m)       Pain:    Pain Interventions: ICE / MOTRIN AT HOME   Pain Goal: 0  Oxygen: No    Current needs required DRUG SCREEN    Telemetry: No  LDAs:    Continuous Infusions:   Mobility: Independent  Yo Fall Risk Score: No flowsheet data found.   Fall Interventions: SIDE RAIL X 1   Report given to: Caitlyn Jordan RN  01/20/23 9005

## 2023-01-20 NOTE — ED NOTES
Pt given discharge instructions. Verbalized understanding and left ambulatory per self. Pt in stable condition.       Maye Barron RN  01/20/23 0197

## 2023-01-20 NOTE — ED PROVIDER NOTES
[facility]  eMERGENCY dEPARTMENT eNCOUnter          CHIEF COMPLAINT       Chief Complaint   Patient presents with    Work Related Injury    Hand Injury       Nurses Notes reviewed and I agree except as noted in the HPI. 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 female who presents work-related right hand injury. Apparently the patient was using clippers repetitively at work yesterday. Soon after work she noted some redness to her right palm area specifically just beneath the right thumb area. She noted progressive swelling to the involved hand. And she notes moderate pain. She admits to taking over-the-counter Motrin with mild relief. Notes that the pain extends up to her wrist area. REVIEW OF SYSTEMS     Review of Systems   Constitutional:  Negative for chills and fever. Gastrointestinal:  Negative for nausea and vomiting. Musculoskeletal:  Positive for arthralgias (right hand pain in palm area) and joint swelling. Negative for myalgias. Skin:  Positive for color change (bruising noted in right palm). Hematological:  Negative for adenopathy. Does not bruise/bleed easily. All other systems reviewed and are negative. PAST MEDICAL HISTORY    has a past medical history of Headache(784.0). SURGICAL HISTORY      has a past surgical history that includes  section; Endometrial ablation; Carpal tunnel release (Right); Ankle Fusion; and Tooth Extraction (2020).     CURRENT MEDICATIONS       Previous Medications    ASPIRIN-ACETAMINOPHEN-CAFFEINE (EXCEDRIN MIGRAINE) 250-250-65 MG PER TABLET    Take 1 tablet by mouth every 6 hours as needed for Headaches    DICLOFENAC (VOLTAREN) 75 MG EC TABLET    Take 1 tablet by mouth 2 times daily    ESCITALOPRAM OXALATE (LEXAPRO PO)    Take 20 mg by mouth     KETOROLAC (TORADOL) 10 MG TABLET    Take 1 tablet by mouth every 6 hours as needed for Pain    LEVOTHYROXINE (SYNTHROID) 50 MCG TABLET    Take 50 mcg by mouth Daily    PREDNISONE (DELTASONE) 10 MG TABLET    4 tabs po x 1 day, 3 tabs po x 1 day, 2 tabs po  X 1 day, 1 tab po x 1 day. ALLERGIES     is allergic to augmentin [amoxicillin-pot clavulanate], chantix [varenicline], and penicillins. FAMILY HISTORY     has no family status information on file. family history is not on file. SOCIAL HISTORY      reports that she has been smoking cigarettes. She started smoking about 18 years ago. She has a 15.00 pack-year smoking history. She has never used smokeless tobacco. She reports current alcohol use. She reports that she does not use drugs. PHYSICAL EXAM     INITIAL VITALS:  height is 5' 3\" (1.6 m) and weight is 212 lb (96.2 kg). Her temporal temperature is 96.9 °F (36.1 °C). Her blood pressure is 135/81 and her pulse is 90. Her respiration is 19 and oxygen saturation is 96%. Physical Exam  Vitals and nursing note reviewed. Constitutional:       General: She is not in acute distress. Appearance: She is obese. Musculoskeletal:         General: Swelling, tenderness and signs of injury present. Right hand: Swelling and tenderness present. Decreased range of motion. Normal capillary refill. Normal pulse. Comments: Ecchymosis is noted to the right thenar eminence of the right hand. There is some noticeable swelling of the right hand when compared to the left unaffected. Skin:     Capillary Refill: Capillary refill takes less than 2 seconds. Findings: Bruising present. Neurological:      General: No focal deficit present. Mental Status: She is alert. Sensory: No sensory deficit. DIFFERENTIAL DIAGNOSIS:   Contusion to the right hand, fracture of the right hand not otherwise specified.     DIAGNOSTIC RESULTS     EKG: All EKG's are interpreted by the Emergency Department Physician who either signs or Co-signs this chart in the absence of a cardiologist.      RADIOLOGY: non-plain film images(s) such as CT, Ultrasound and MRI are read by the radiologist.      XR HAND RIGHT (MIN 3 VIEWS) (Final result)  Result time 01/20/23 07:29:31  Final result by Priscila Saenz MD (01/20/23 07:29:31)                Impression:      No fracture or dislocation. Final report electronically signed by Dr. Coretta Cote on 1/20/2023 7:29 AM            Narrative:    PROCEDURE: XR HAND RIGHT (MIN 3 VIEWS)     CLINICAL INFORMATION: Right hand swelling     TECHNIQUE: 3 views of the right hand     COMPARISON: None     FINDINGS: There is no fracture or dislocation. Joint spaces are preserved. No soft tissue or osseous abnormalities are identified. LABS:   Labs Reviewed - No data to display    EMERGENCY DEPARTMENT COURSE:   Vitals:    Vitals:    01/20/23 0654 01/20/23 0702   BP: 135/81    Pulse: 90 90   Resp: 19    Temp: 96.9 °F (36.1 °C)    TempSrc: Temporal    SpO2: 96%    Weight: 212 lb (96.2 kg)    Height: 5' 3\" (1.6 m)      On exam there is some swelling but is noticeable to the right hand. There is some ecchymosis noted to the right thenar eminence. The patient can flex and extend all of her fingers. Palpation there is some grimace. X-ray of the right hand was obtained noted some soft tissue swelling but no fracture, or dislocation. At this time the symptoms seem to be consistent with a repetitive use of an instrument at work that caused a localized swelling. I will have the patient rest, ice the affected hand. I would limit her activity with this repetitive movement at work for the next few days. Patient may use over-the-counter anti-inflammatories for pain and swelling. Patient may return to [address] [place] [address] ambulatory care [place] and/or the occupational clinic for release of work restrictions. PROCEDURES:  None    FINAL IMPRESSION      1. Contusion of right hand, initial encounter          DISPOSITION/PLAN   Home. Care instructions provided. Patient will rest, ice, elevate the affected hand.   No use of the right hand for at least the next 3 days. Patient will return to occupational health for restrictions placed today.     PATIENT REFERRED TO:  Go to [facility] on Monday 1/23/23 at 12 noon    Go in 3 days      DISCHARGE MEDICATIONS:  New Prescriptions    No medications on file       (Please note that portions of this note were completed with a voice recognition program.  Efforts were made to edit the dictations but occasionally words are mis-transcribed.)    MD Maria Esther Felder MD  01/20/23 7664

## 2023-01-25 ENCOUNTER — TELEPHONE (OUTPATIENT)
Dept: PHYSICAL MEDICINE AND REHAB | Age: 58
End: 2023-01-25

## 2023-01-25 ENCOUNTER — OFFICE VISIT (OUTPATIENT)
Dept: PHYSICAL MEDICINE AND REHAB | Age: 58
End: 2023-01-25

## 2023-01-25 VITALS
BODY MASS INDEX: 38.95 KG/M2 | DIASTOLIC BLOOD PRESSURE: 74 MMHG | HEIGHT: 63 IN | SYSTOLIC BLOOD PRESSURE: 115 MMHG | WEIGHT: 219.8 LBS

## 2023-01-25 DIAGNOSIS — M79.18 MYOFASCIAL PAIN: ICD-10-CM

## 2023-01-25 DIAGNOSIS — M54.16 LUMBAR RADICULOPATHY: ICD-10-CM

## 2023-01-25 DIAGNOSIS — M47.816 LUMBAR SPONDYLOSIS: ICD-10-CM

## 2023-01-25 DIAGNOSIS — M53.3 SACROILIAC JOINT PAIN: ICD-10-CM

## 2023-01-25 DIAGNOSIS — M48.07 NEURAL FORAMINAL STENOSIS OF LUMBOSACRAL SPINE: Primary | ICD-10-CM

## 2023-01-25 DIAGNOSIS — M51.37 DDD (DEGENERATIVE DISC DISEASE), LUMBOSACRAL: ICD-10-CM

## 2023-01-25 DIAGNOSIS — M48.061 SPINAL STENOSIS OF LUMBAR REGION WITHOUT NEUROGENIC CLAUDICATION: ICD-10-CM

## 2023-01-25 DIAGNOSIS — G89.4 CHRONIC PAIN SYNDROME: ICD-10-CM

## 2023-01-25 DIAGNOSIS — M47.816 FACET HYPERTROPHY OF LUMBAR REGION: ICD-10-CM

## 2023-01-25 DIAGNOSIS — M53.3 SI (SACROILIAC) JOINT DYSFUNCTION: ICD-10-CM

## 2023-01-25 PROBLEM — F43.21 ADJUSTMENT DISORDER WITH DEPRESSED MOOD: Status: ACTIVE | Noted: 2021-11-10

## 2023-01-25 PROBLEM — E66.9 OBESITY, UNSPECIFIED: Status: ACTIVE | Noted: 2022-01-31

## 2023-01-25 PROBLEM — J44.1 CHRONIC OBSTRUCTIVE PULMONARY DISEASE WITH (ACUTE) EXACERBATION (HCC): Status: ACTIVE | Noted: 2018-01-31

## 2023-01-25 PROBLEM — G43.109 MIGRAINE WITH AURA: Status: ACTIVE | Noted: 2019-08-14

## 2023-01-25 RX ORDER — TRAMADOL HYDROCHLORIDE 50 MG/1
50 TABLET ORAL DAILY PRN
Qty: 30 TABLET | Refills: 0 | Status: SHIPPED | OUTPATIENT
Start: 2023-01-25 | End: 2023-02-24

## 2023-01-25 RX ORDER — METHOCARBAMOL 100 MG/ML
100 INJECTION, SOLUTION INTRAMUSCULAR; INTRAVENOUS ONCE
Status: SHIPPED | OUTPATIENT
Start: 2023-01-25

## 2023-01-25 NOTE — PATIENT INSTRUCTIONS
Learning About Lumbar Epidural Steroid Injections  What is a lumbar epidural steroid injection? A lumbar epidural injection is a shot into the epidural space--the area in your back around the spinal cord. The shot may help reduce pain, tingling, or numbness in your back, buttock, or leg. The shot may have a steroid to reduce pain and swelling and a local anesthetic to numb nerves. How is a lumbar epidural steroid injection done? The doctor may use an imaging test before or during your injection. This can be an MRI, a CT scan, or an X-ray. These tests can show where your nerve problems are. After finding the right spot, the doctor may inject a numbing medicine into the skin where you will get the steroid injection. Then he or she puts the needle for the steroid into the numbed area. You may feel some pressure. You could feel some stinging or burning during the injection. How long does an epidural steroid injection take? It takes about 10 to 15 minutes to get this injection. You will probably go home about 20 to 30 minutes after you get it. What can you expect after a lumbar epidural steroid injection? If your injection had local anesthetic and a steroid, your legs may feel heavy or numb right after. You will probably be able to walk. But you may need to be extra careful. Take care not to lose your balance, and be sure to follow your doctor's instructions. If your injection contained local anesthetic, you may feel better right away. But this pain relief will last only a few hours. Your pain will probably return. This is because the steroids have not started working yet. Before the steroids start to work, your back may be sore for a few days. These injections don't always work. When they do, it takes 1 to 5 days. This pain relief can last for several days to a few months or longer. You may want to do less than normal for a few days. But you may also be able to return to your daily routine.   Some people are dizzy or feel sick to their stomach after getting this injection. These symptoms usually don't last very long. If your pain is better, you may be able to keep doing your normal activities or physical therapy. But try not to overdo it, even if your back pain has improved a lot. If your pain is only a little better or if it comes back, your doctor may recommend another injection in a few weeks. If your pain has not changed, talk to your doctor about other treatment choices. Follow-up care is a key part of your treatment and safety. Be sure to make and go to all appointments, and call your doctor if you are having problems. It's also a good idea to know your test results and keep a list of the medicines you take. Where can you learn more? Go to [email or web address].[email or web address] org and sign in to your Look[employer] account. Enter C697 in the [employer] Media box to learn more about \"Learning About Lumbar Epidural Steroid Injections. \"     If you do not have an account, please click on the \"Sign Up Now\" link. Current as of: July 1, 2021               Content Version: 13.1  © 4102-7563 [employer]. Care instructions adapted under license by [facility] ([facility]). If you have questions about a medical condition or this instruction, always ask your healthcare professional. [address] any warranty or liability for your use of this information.

## 2023-01-25 NOTE — PROGRESS NOTES
Chronic Pain/PM&R Clinic Note     Encounter Date: 1/25/23    Subjective:   Chief Complaint:   Chief Complaint   Patient presents with    Pain    Back Pain    Injections     Pt states she was referred for injections. History of Present Illness:   Padma Lao is a 56 y.o. female seen in the clinic initially on 1/25/2023 upon request from Dr Chapo Grigsby for her history of low back pain. She has personal medical history of hypothyroidism and, vitamin D deficiency, depression, anxiety, current smoker. Recommended transforaminal lumbar epidural steroid injection right side at L4-5, and physical therapy. Patient presents with complaints of bilateral low back pain, right side is worse. She states it is going to her buttocks and posterior thighs. She states it also goes up a little bit on her right side to her mid back. She states her pain does not go past her knees at all. She reports that pain has been occurring for 2 years. She denies any accident, injury, falls that caused her pain to start. She describes the pain as tingling, jarring, spasms, electrical current, constant nagging pain. She states it varies in sensations and intensity but is always there. She states pain is worsened with doing dishes, bending, walking, lifting, twisting, standing for long periods. She states pain is alleviated somewhat with TENS unit, ice, rest, therapy. She states she is working with physical therapy at this time at [facility]. She reports she has had 2 weeks of sessions, and she has noticed some relief. She states she feels like she is getting improvement of her stiffness and spasms. She states he continues to do home exercises with some relief. She also states she has gone to chiropractor in the past, goes about monthly with minimal relief but it does not last.  She states she does feel weakness in her legs, right leg is worse. She denies any falls or use of a cane.   She states pain will wake her up sometimes at night with certain movements. She feels like pain is interfering with her daily life, work. She states she works as a  at [employer], where she has to bend, lift, twisting exacerbates her pain. She denies any numbness, tingling, loss of bowel or bladder control. Pain scale : at worst pain is 10/10, at best pain is 2/10. History of Interventions:   Surgery: No previous lumbar surgeries  Injections: None    Current Treatment Medications:   Excedrin - mild relief   Tylenol - mild relief    Historical Treatment Medications:   Tramadol - relief  Norco - relief   Diclofenac - no relief  Toradol - unsure  Prednisone - with relief  Flexeril - S/E dizzy, no relief     Imaging:  Lumbar x-ray 11/1/2022  PROCEDURE: XR LUMBAR SPINE (2-3 VIEWS)       CLINICAL INFORMATION: low back pain,       COMPARISON: No prior study. TECHNIQUE: AP and lateral views of the lumbar spine. FINDINGS:               The lumbar vertebral bodies are normally aligned. There are no compression fractures. There is disc space narrowing at T11-12, T12-L1, L1-2, L2-3, L3-4 and L5-S1. Charmaine Karst The facet joints are within normal limits. No suspicious osseous lesions are present. There is degenerative change involving the sacroiliac joints bilaterally. .  No paraspinal abnormality is noted. Impression       1. Lumbar spondylosis at T11-12, T12-L1, L1-2, L2-3, L3-4 and L5-S1.   2. Degenerative change involving the sacroiliac joints bilaterally. MRI lumbar 11/11/2022  PROCEDURE: MRI LUMBAR SPINE WO CONTRAST       CLINICAL INFORMATION: Lumbar spondylosis, Acute bilateral low back pain with bilateral sciatica, Acute bilateral low back pain with bilateral sciatica, Degenerative joint disease of sacroiliac joint ([facility] [address] COMPARISON: Plain radiographs dated 11/1/2022. .       TECHNIQUE: Sagittal and axial T1 and T2-weighted images were obtained through the lumbar spine.        FINDINGS: The lumbar vertebral bodies are normally aligned. There is degenerative change in the vertebral body endplates adjacent to the T12-L1, L3-4 disc spaces. .  There is no bone marrow edema. There are no compression fractures. No pars defects are noted. The distal spinal cord, conus medullaris and cauda equina are normal.        There are no gross abnormalities in the visualized aspects of the distal thoracic spine. On the axial images, at T12-L1, there is a 1.7 mm bulging disc and facet hypertrophy. This results in mild canal and mild-to-moderate bilateral foraminal stenosis. At L1-L2, there is a 1.2 mm bulging disc and facet hypertrophy. This results in mild canal and mild-to-moderate bilateral foraminal stenosis. At L2-L3, there is no disc herniation, canal or foraminal stenosis. At L3-L4, there is a 1.8 mm bulging disc and facet hypertrophy. This results in mild-to-moderate canal and bilateral foraminal stenosis, left greater than right       At L4-L5, there is a 1.7 mm bulging disc and facet hypertrophy. This results in mild canal and moderate bilateral foraminal stenosis. At L5-S1, there is a 1.2 mm bulging disc and facet hypertrophy this results in mild canal and bilateral foraminal stenosis. There is degenerative change involving the sacroiliac joints bilaterally. Impression   1. Degenerative changes resulting in mild-to-moderate canal and bilateral foraminal stenosis, left greater than right at L3-4.   2. There is mild canal and moderate bilateral foraminal stenosis at L4-5.   3. There is mild canal and mild-to-moderate bilateral foraminal stenosis at T12-L1 and L1-2.   4. There is mild canal and bilateral foraminal stenosis at L5-S1.   5. There is degenerative change involving the sacroiliac joints bilaterally.            Past Medical History:   Diagnosis Date    Headache(784.0)        Past Surgical History:   Procedure Laterality Date    ANKLE FUSION CARPAL TUNNEL RELEASE Right      SECTION      ENDOMETRIAL ABLATION      TOOTH EXTRACTION  2020       No family history on file. Medications & Allergies:   Current Outpatient Medications   Medication Instructions    aspirin-acetaminophen-caffeine (EXCEDRIN MIGRAINE) 250-250-65 MG per tablet 1 tablet, Oral, EVERY 6 HOURS PRN    diclofenac (VOLTAREN) 75 mg, Oral, 2 TIMES DAILY    Escitalopram Oxalate (LEXAPRO PO) 20 mg, Oral    ketorolac (TORADOL) 10 mg, Oral, EVERY 6 HOURS PRN    levothyroxine (SYNTHROID) 50 mcg, Oral, DAILY    predniSONE (DELTASONE) 10 MG tablet 4 tabs po x 1 day, 3 tabs po x 1 day, 2 tabs po  X 1 day, 1 tab po x 1 day. Allergies   Allergen Reactions    Augmentin [Amoxicillin-Pot Clavulanate]      GI upset    Chantix [Varenicline]      Nausea, sweats, chills    Penicillins Rash       Review of Systems:   Constitutional: negative for weight changes or fevers  Genitourinary: negative for bowel/bladder incontinence   Musculoskeletal: positive for low back pain, SI pain  Neurological: Positive for bilateral leg weakness, negative for numbness  Behavioral/Psych: Positive for anxiety/depression   All other systems reviewed and are negative    Objective: There were no vitals filed for this visit. Constitutional: Pleasant, no acute distress   Head: Normocephalic, atraumatic   Eyes: Conjunctivae normal   Neck: Supple, symmetrical   Lungs: Normal respiratory effort, non-labored breathing   Cardiovascular: Limbs warm and well perfused   Abdomen: Non-protruded   Musculoskeletal: Muscle bulk symmetric, no atrophy, no gross deformities   · Lumbar Spine: ROM reduced. Lumbar paraspinals tender to palpation bilaterally. SLR neg bilaterally. CHRIS pos bilaterally. GAENSLEN pos bilaterally. Negative facet loading bilaterally. Bilateral SI joints tender to palpation. SI Distraction maneuver positive on left/right side. Neurological: Cranial nerves II-XII grossly intact.    · Gait - Normal, non-antalgic gait. Ambulates without assistive device. · Motor: 4/5 muscle strength in bilateral hip flexion, 5/5 bilateral knee flexion, knee extension, ankle dorsiflexion, and ankle plantar flexion   · Sensory: LT sensation intact in lower limbs   Skin: No rashes or lesions present   Psychological: Cooperative, no exaggerated pain behaviors     Assessment:    Diagnosis Orders   1. Neural foraminal stenosis of lumbosacral spine        2. Spinal stenosis of lumbar region without neurogenic claudication        3. Lumbar radiculopathy        4. Lumbar spondylosis        5. Facet hypertrophy of lumbar region        6. SI (sacroiliac) joint dysfunction        7. Sacroiliac joint pain        8. DDD (degenerative disc disease), lumbosacral        9. Chronic pain syndrome             Enma Newell is a 56 y. o.female presenting to the pain clinic for evaluation of low back pain. She is noted to have radicular symptoms, lumbar axial facet mediated, as well as bilateral SI joint dysfunction. Per recommendations of  I can see her with Ortho neuro, will start with a transforaminal lumbar epidural steroid injection at right L4-5 with Dr. Rody Boyle. Did discuss possible injections in the future to assist with pain control such as lumbar facet series at L4-5, L5-S1 bilaterally, bilateral SI joint injections, as well as interlaminar lumbar epidural steroid injection at L4-5. Also discussed trial trigger point injections today to assist with myofascial pain and spasms. She is agreeable to this, see note below. I have also started her on tramadol 50 mg daily as needed for severe pain (7-10/10). I will see her back 3 to 4 weeks after procedures completed      Plan: The following treatment recommendations and plan were discussed in detail with Enma Newell. Imaging:   I have reviewed patients imaging of lumbar x-ray, lumbar MRI and results were discussed with patient today. Analgesics:    The side effect profile of long-term opioid use was discussed and recommended emphasis on multimodal strategies for pain management. Patient is taking Acetaminophen. Patient informed that the maximum amount of acetaminophen taken on a regular basis should only be 4000 mg per day. Patient is taking Excedrin. Patient is advised to take as prescribed and not take on an empty stomach. Adjuvants: For continued chronic pain with associated musculoskeletal component, the patient is advised to start tramadol 50 mg daily as needed for severe pain (7-10/10). Educated on side effects, tolerance, addiction, safe medication storage, office policy refill request    OARRS reviewed, pain contract agreement signed    Interventions: In presence of lumbosacral radiating pain, the option of transforaminal lumbar epidural steroid injection approach at right L4-5 was chosen. The risks and benefits were discussed in detail with the patient. Patient wants to proceed with the injection with Dr Marsha Moura    Anticoagulation/NPO Recommendations:   Patient does need to hold any medications prior to the procedure. NSAIDs x 5 days  Patient will need to be NPO x 8 hours prior to the procedure. We will start an IV prior to the procedure    Multidisciplinary Pain Management:   In the presence of complex, chronic, and multi-factorial pain, the importance of a multidisciplinary approach to pain management in the patients management regimen was emphasized and discussed in great detail. PHYSICAL THERAPY: Patient is advised to see a physical therapist for gentle stretching exercises and conditioning exercises for management of pain. OBESITY: Patient is advised to seek nutrition consult to help in managing their weight to decrease its impact on pain. SMOKING: Impact of smoking in patient's pain was discussed and patient is counseled against smoking.    The patient was also advised to continue physical therapy and stretching exercises at home and cognitive behavioral and/or group therapy. Referrals:      Prescriptions Written This Visit:   Tramadol 50 mg daily as needed    Follow-up:   3-4 weeks after injection    It was my pleasure to evaluate Loy Fernandez today. I spent over 60 minutes evaluating this patient, reviewing previous notes and images and completing documentation. FAISAL Mccallum CNP   Interventional Pain Management/PM&R   [place]     Procedure  Visit Date: 1/25/23    Loy Fernandez is a 56 y.o. female presents today in the office for the following:  Chief Complaint   Patient presents with    Pain    Back Pain    Injections     Pt states she was referred for injections. History of Present Illness   Sage Muñoz is here today for trigger point injections. Pre-Op Diagnosis   Myofacial pain syndrome     Post-Op Diagnosis   Myofacial pain syndrome     Procedure: Trigger point injection(s)    Procedure Documentation   Patient identified. Consent signed. Site identified. Trigger points were identified in the bilateral thoracic paraspinals, bilateral lumbar paraspinals, right side latissimus dorsi for a total of 12 trigger point injections. Then with a 25g needle entered each trigger point and after negative aspiration, 1 cc of a mixture containing 1:10 - 100 mg methocarbamol: 0.25% bupivacaine was injected at each trigger point for a total of 12 trigger points. Procedural Complications: None      Vitals:    01/25/23 0807 01/25/23 0811   BP: 112/72 115/74   Site: Left Upper Arm Right Upper Arm   Position: Sitting Sitting   Weight: 219 lb 12.8 oz (99.7 kg)    Height: 5' 3\" (1.6 m)        Conclusion   No complications encountered. Patient discharged stable condition. Patient told if any problems to call office or go to ER. No orders of the defined types were placed in this encounter.       Electronically signed by FAISAL Mccallum CNP on 1/25/23 at 9:02 AM EST

## 2023-02-06 ENCOUNTER — TELEPHONE (OUTPATIENT)
Dept: PHYSICAL MEDICINE AND REHAB | Age: 58
End: 2023-02-06

## 2023-02-06 DIAGNOSIS — M51.37 DDD (DEGENERATIVE DISC DISEASE), LUMBOSACRAL: ICD-10-CM

## 2023-02-06 DIAGNOSIS — M53.3 SI (SACROILIAC) JOINT DYSFUNCTION: ICD-10-CM

## 2023-02-06 DIAGNOSIS — M48.07 NEURAL FORAMINAL STENOSIS OF LUMBOSACRAL SPINE: ICD-10-CM

## 2023-02-06 DIAGNOSIS — G89.4 CHRONIC PAIN SYNDROME: ICD-10-CM

## 2023-02-06 DIAGNOSIS — M48.061 SPINAL STENOSIS OF LUMBAR REGION WITHOUT NEUROGENIC CLAUDICATION: ICD-10-CM

## 2023-02-06 DIAGNOSIS — M53.3 SACROILIAC JOINT PAIN: ICD-10-CM

## 2023-02-06 DIAGNOSIS — M54.16 LUMBAR RADICULOPATHY: ICD-10-CM

## 2023-02-06 DIAGNOSIS — M47.816 FACET HYPERTROPHY OF LUMBAR REGION: ICD-10-CM

## 2023-02-06 DIAGNOSIS — M47.816 LUMBAR SPONDYLOSIS: ICD-10-CM

## 2023-02-06 NOTE — TELEPHONE ENCOUNTER
Patient is calling in for her tramadol 50 mg refill to [facility] in [place]. She said some days she takes it up to 3 times per day as needed, so she will be out in a few days. Please advise.

## 2023-02-09 PROBLEM — J30.9 ALLERGIC RHINITIS, UNSPECIFIED: Status: ACTIVE | Noted: 2019-02-04

## 2023-02-09 PROBLEM — L60.2 ONYCHOGRYPHOSIS: Status: ACTIVE | Noted: 2019-04-29

## 2023-02-09 PROBLEM — M19.079 PRIMARY LOCALIZED OSTEOARTHROSIS OF ANKLE AND FOOT: Status: ACTIVE | Noted: 2023-02-09

## 2023-02-09 PROBLEM — F17.210 NICOTINE DEPENDENCE, CIGARETTES, UNCOMPLICATED: Status: ACTIVE | Noted: 2022-01-31

## 2023-02-16 ENCOUNTER — PREP FOR PROCEDURE (OUTPATIENT)
Dept: PHYSICAL MEDICINE AND REHAB | Age: 58
End: 2023-02-16

## 2023-02-20 NOTE — DISCHARGE INSTRUCTIONS
ANESTHESIA INSTRUCTIONS FOLLOWING SURGERY        Since you may experience some intermittent light-headedness for the next several hours, we suggest you plan on bed rest or quiet relaxation this evening. You must have a friend or relative stay with you tonight. Because of the sedation you have received, it is recommended that you do not drive a motor vehicle, operate any kind of machinery, or sign any contractual agreement for 24 hours following the procedure. You should not take alcoholic beverages tonight and only take sleeping medication that has been specifically prescribed for you by your physician. Call office [phone] if you have:  Temperature greater than 100.4  Persistent nausea and vomiting  Severe uncontrolled pain  Redness, tenderness, or signs of infection (pain, swelling, redness, odor or green/yellow discharge around the site)  Difficulty breathing, headache or visual disturbances  Hives  Persistent dizziness or light-headedness  Extreme fatigue  Any other questions or concerns you may have after discharge    In an emergency, call 911 or go to an Emergency Department at a nearby hospital    It is important to bring a complete, current list of your medications to any medical appointments or hospitalizations. REMINDER:   Carry a list of your medications and allergies with you at all times  Call your pharmacy at least 1 week in advance to refill prescriptions    Diet: Resume your usual diet. Good nutrition promotes healing. Increase fluid intake. Activity: Rest for 24 hrs then resume normal activity. HOME MEDICATIONS:      If on blood thinning products such as; Aspirin, NSAIDS, Plavix, Coumadin, Xarelto, Fish Oil, Multi-Vitamins or Herbal Supplements restart in 24 hours      Restart Metformin in 48 hours if you had procedure with dye. Restart Metformin in 24 hours if no dye used during procedure.         Education Materials Received: {yes/no:701209}  Belongings Returned: {yes/no:030919}          I understand and acknowledge receipt of the above instructions. Patient or Guardian Signature                                                         Date/Time                                                                                                                                            Physician's or R.N.'s Signature                                                                  Date/Time      The discharge instructions have been reviewed with the patient and/or Guardian. Patient and/or Guardian signed and retained a printed copy. Surgical Site Infections        How can we work together to prevent Surgical Site Infections? We would like to thank you for choosing [facility] for your Surgical Care. Below you will find helpful information on how we can work together to prevent Surgical Site Infections. What is a Surgical Site Infection (SSI)? A surgical site infection is an infection that occurs after surgery in the part of the body where the surgery took place. Most patients who have surgery do not develop an infection. However, infections develop in about 1 to 3 out of every 100 patients who have surgery. Some of the common symptoms of a surgical site infection are:  Redness and pain around the area where you had surgery  Drainage of cloudy fluid from your surgical wound  Fever    Can SSIs be treated? Yes. Most surgical site infections can be treated with antibiotics. The antibiotic given to you depends on the bacteria (germs) causing the infection. Sometimes patients with SSIs also need another surgery to treat the infection. What are some of the things that hospitals are doing to prevent SSIs? To prevent SSIs, doctors, nurses, and other healthcare providers:   May remove some of your hair immediately before your surgery using electric clippers if the hair is in the same area where the procedure will occur. They should not shave you with a razor. Give you antibiotics before your surgery starts. In most cases, you should get antibiotics within 60 minutes before the surgery starts and the antibiotics should be stopped within 24 hours after surgery. Clean the skin at the site of your surgery with a special soap that kills germs. Clean their hands and arms up to their elbows with an antiseptic agent just before the surgery. Wear special hair covers, masks, gowns, and gloves during surgery to  keep the surgery area clean. Clean their hands with soap and water or an alcohol-based hand rub before and after caring for each patient. If you do not see your providers clean their hands, please     ask  them to do so. What can I do to help prevent SSIs? Before your surgery:  Tell your doctor about other medical problems you may have. Health problems such as allergies, diabetes, and obesity could affect your surgery and your treatment. Quit smoking. Patients who smoke get more infections. Talk to your doctor about how you can quit before your surgery. Do not shave near where you will have surgery. Shaving with a razor can irritate your skin and make it easier to develop an infection. At the time of your surgery:  Speak up if someone tries to shave you with a razor before surgery. Ask why you need to be shaved and talk with your surgeon if you have any concerns. Ask if you will get antibiotics before surgery. After your surgery:  Make sure that your healthcare providers clean their hands before examining you, either with soap and water or an alcohol-based hand rub. Family and friends who visit you should not touch the surgical wound or dressings. Family and friends should clean their hands with soap and water or an alcohol-based hand rub before and after visiting you.  If you do not see them clean their hands, ask them to clean their hands. What do I need to do when I go home from the hospital?  Before you go home, your doctor or nurse should explain everything you need to know about taking care of your wound. Make sure you understand how to care for your wound before you leave the hospital.  Always clean your hands before and after caring for your wound. Before you go home, make sure you know who to contact if you have questions or problems after you get home. If you have any symptoms of an infection, such as redness and pain at the surgery site, drainage, or fever, call your doctor immediately. If you have additional questions, please ask your doctor or nurse. Call office [phone] if you have:  Temperature greater than 100.4  Persistent nausea and vomiting  Severe uncontrolled pain  Redness, tenderness, or signs of infection (pain, swelling, redness, odor or green/yellow discharge around the site)  Difficulty breathing, headache or visual disturbances  Hives  Persistent dizziness or light-headedness  Extreme fatigue  Any other questions or concerns you may have after discharge    In an emergency, call 911 or go to an Emergency Department at a nearby hospital    It is important to bring a complete, current list of your medications to any medical appointments or hospitalizations. REMINDER:   Carry a list of your medications and allergies with you at all times  Call your pharmacy at least 1 week in advance to refill prescriptions    Diet: Resume your usual diet. Good nutrition promotes healing. Increase fluid intake. Activity: Rest for 24 hrs then resume normal activity. Education Materials Received: {yes/no:309155}  Belongings Returned: {yes/no:[id number]}          I understand and acknowledge receipt of the above instructions.                                                                                                                                           Patient or Guardian Signature                                                         Date/Time                                                                                                                                            Physician's or R.N.'s Signature                                                                  Date/Time      The discharge instructions have been reviewed with the patient and/or Guardian. Patient and/or Guardian signed and retained a printed copy.

## 2023-02-23 ENCOUNTER — HOSPITAL ENCOUNTER (OUTPATIENT)
Age: 58
Setting detail: OUTPATIENT SURGERY
Discharge: HOME | End: 2023-02-23
Attending: PAIN MEDICINE | Admitting: PAIN MEDICINE
Payer: COMMERCIAL

## 2023-02-23 ENCOUNTER — APPOINTMENT (OUTPATIENT)
Dept: GENERAL RADIOLOGY | Age: 58
End: 2023-02-23
Attending: PAIN MEDICINE
Payer: COMMERCIAL

## 2023-02-23 ENCOUNTER — ANESTHESIA EVENT (OUTPATIENT)
Dept: OPERATING ROOM | Age: 58
End: 2023-02-23
Payer: COMMERCIAL

## 2023-02-23 ENCOUNTER — ANESTHESIA (OUTPATIENT)
Dept: OPERATING ROOM | Age: 58
End: 2023-02-23
Payer: COMMERCIAL

## 2023-02-23 VITALS
SYSTOLIC BLOOD PRESSURE: 128 MMHG | OXYGEN SATURATION: 97 % | HEIGHT: 63 IN | DIASTOLIC BLOOD PRESSURE: 88 MMHG | BODY MASS INDEX: 38.31 KG/M2 | HEART RATE: 76 BPM | RESPIRATION RATE: 16 BRPM | WEIGHT: 216.2 LBS | TEMPERATURE: 97.5 F

## 2023-02-23 PROBLEM — M48.061 SPINAL STENOSIS OF LUMBAR REGION WITHOUT NEUROGENIC CLAUDICATION: Status: ACTIVE | Noted: 2023-02-23

## 2023-02-23 PROBLEM — M54.16 LUMBAR RADICULOPATHY: Status: ACTIVE | Noted: 2023-02-23

## 2023-02-23 PROCEDURE — 3700000000 HC ANESTHESIA ATTENDED CARE: Performed by: PAIN MEDICINE

## 2023-02-23 PROCEDURE — 6360000002 HC RX W HCPCS: Performed by: NURSE ANESTHETIST, CERTIFIED REGISTERED

## 2023-02-23 PROCEDURE — 3209999900 FLUORO FOR SURGICAL PROCEDURES

## 2023-02-23 PROCEDURE — 3600000054 HC PAIN LEVEL 3 BASE: Performed by: PAIN MEDICINE

## 2023-02-23 PROCEDURE — 6360000004 HC RX CONTRAST MEDICATION: Performed by: PAIN MEDICINE

## 2023-02-23 PROCEDURE — 2709999900 HC NON-CHARGEABLE SUPPLY: Performed by: PAIN MEDICINE

## 2023-02-23 PROCEDURE — 3600000055 HC PAIN LEVEL 3 ADDL 15 MIN: Performed by: PAIN MEDICINE

## 2023-02-23 PROCEDURE — 2500000003 HC RX 250 WO HCPCS: Performed by: PAIN MEDICINE

## 2023-02-23 PROCEDURE — 7100000010 HC PHASE II RECOVERY - FIRST 15 MIN: Performed by: PAIN MEDICINE

## 2023-02-23 PROCEDURE — 6360000002 HC RX W HCPCS: Performed by: PAIN MEDICINE

## 2023-02-23 PROCEDURE — 3700000001 HC ADD 15 MINUTES (ANESTHESIA): Performed by: PAIN MEDICINE

## 2023-02-23 PROCEDURE — 7100000011 HC PHASE II RECOVERY - ADDTL 15 MIN: Performed by: PAIN MEDICINE

## 2023-02-23 PROCEDURE — 2580000003 HC RX 258: Performed by: PAIN MEDICINE

## 2023-02-23 RX ORDER — FENTANYL CITRATE 50 UG/ML
INJECTION, SOLUTION INTRAMUSCULAR; INTRAVENOUS PRN
Status: DISCONTINUED | OUTPATIENT
Start: 2023-02-23 | End: 2023-02-23 | Stop reason: SDUPTHER

## 2023-02-23 RX ORDER — DEXAMETHASONE SODIUM PHOSPHATE 4 MG/ML
INJECTION, SOLUTION INTRA-ARTICULAR; INTRALESIONAL; INTRAMUSCULAR; INTRAVENOUS; SOFT TISSUE PRN
Status: DISCONTINUED | OUTPATIENT
Start: 2023-02-23 | End: 2023-02-23 | Stop reason: ALTCHOICE

## 2023-02-23 RX ORDER — BUPIVACAINE HYDROCHLORIDE 2.5 MG/ML
INJECTION, SOLUTION EPIDURAL; INFILTRATION; INTRACAUDAL PRN
Status: DISCONTINUED | OUTPATIENT
Start: 2023-02-23 | End: 2023-02-23 | Stop reason: ALTCHOICE

## 2023-02-23 RX ORDER — SODIUM CHLORIDE 9 MG/ML
INJECTION, SOLUTION INTRAVENOUS CONTINUOUS
Status: DISCONTINUED | OUTPATIENT
Start: 2023-02-23 | End: 2023-02-23 | Stop reason: HOSPADM

## 2023-02-23 RX ORDER — LIDOCAINE HYDROCHLORIDE 10 MG/ML
INJECTION, SOLUTION INFILTRATION; PERINEURAL PRN
Status: DISCONTINUED | OUTPATIENT
Start: 2023-02-23 | End: 2023-02-23 | Stop reason: ALTCHOICE

## 2023-02-23 RX ADMIN — SODIUM CHLORIDE: 9 INJECTION, SOLUTION INTRAVENOUS at 11:24

## 2023-02-23 RX ADMIN — FENTANYL CITRATE 50 MCG: 50 INJECTION, SOLUTION INTRAMUSCULAR; INTRAVENOUS at 12:17

## 2023-02-23 RX ADMIN — FENTANYL CITRATE 100 MCG: 50 INJECTION, SOLUTION INTRAMUSCULAR; INTRAVENOUS at 12:24

## 2023-02-23 RX ADMIN — FENTANYL CITRATE 50 MCG: 50 INJECTION, SOLUTION INTRAMUSCULAR; INTRAVENOUS at 12:14

## 2023-02-23 ASSESSMENT — PAIN SCALES - GENERAL: PAINLEVEL_OUTOF10: 0

## 2023-02-23 ASSESSMENT — PAIN - FUNCTIONAL ASSESSMENT: PAIN_FUNCTIONAL_ASSESSMENT: 0-10

## 2023-02-23 NOTE — ANESTHESIA PRE PROCEDURE
Department of Anesthesiology  Preprocedure Note       Name:  Robyn Perez   Age:  56 y.o.  :  1965                                          MRN:  [id number]         Date:  2023      Surgeon: Pat Elizondo):  Keshav Mixon MD    Procedure: Procedure(s):  right transforaminal lumbar 4-5 epidural steroid injection    Medications prior to admission:   Prior to Admission medications    Medication Sig Start Date End Date Taking? Authorizing Provider   loratadine (CLARITIN REDITABS) 10 MG dissolvable tablet Take 10 mg by mouth daily 22  Historical Provider, MD   fluticasone (FLONASE) 50 MCG/ACT nasal spray 1 spray by Nasal route 2 times daily 22   Historical Provider, MD   diphenhydrAMINE (BENADRYL) 50 MG capsule Take 50 mg by mouth nightly as needed    Historical Provider, MD   traMADol (ULTRAM) 50 MG tablet Take 1 tablet by mouth daily as needed for Pain for up to 30 days. Max Daily Amount: 50 mg 23  Brain Apurva, APRN - CNP   levothyroxine (SYNTHROID) 50 MCG tablet Take 50 mcg by mouth Daily    Historical Provider, MD   aspirin-acetaminophen-caffeine (Jason Jack) 096092-29 MG per tablet Take 1 tablet by mouth every 6 hours as needed for Headaches    Historical Provider, MD   Escitalopram Oxalate (LEXAPRO PO) Take 20 mg by mouth     Historical Provider, MD       Current medications:    No current facility-administered medications for this encounter. Allergies:     Allergies   Allergen Reactions    Augmentin [Amoxicillin-Pot Clavulanate]      GI upset    Chantix [Varenicline]      Nausea, sweats, chills    Penicillins Rash       Problem List:    Patient Active Problem List   Diagnosis Code    Recurrent major depressive disorder, in full remission ([facility] [address] F33.42    Vitamin D deficiency E55.9    Smoker F17.200    Anxiety F41.9    Hypothyroidism E03.9    Fungal infection of toenail B35.1    Adjustment disorder with depressed mood F43.21    Chronic obstructive pulmonary disease with (acute) exacerbation (HCC) J44.1    Migraine with aura G43. 109    Obesity, unspecified E66.9    Allergic rhinitis, unspecified J30.9    Nicotine dependence, cigarettes, uncomplicated L76.718    Onychogryphosis L60.2    Primary localized osteoarthrosis of ankle and foot M19.079       Past Medical History:        Diagnosis Date    Headache(784.0)        Past Surgical History:        Procedure Laterality Date    ANKLE FUSION      CARPAL TUNNEL RELEASE Right      SECTION      ENDOMETRIAL ABLATION      TOOTH EXTRACTION  2020       Social History:    Social History     Tobacco Use    Smoking status: Every Day     Packs/day: 1.00     Years: 15.00     Pack years: 15.00     Types: Cigarettes     Start date: 2005    Smokeless tobacco: Never   Substance Use Topics    Alcohol use: Yes     Comment: on occasion                                Ready to quit: Not Answered  Counseling given: Not Answered      Vital Signs (Current): There were no vitals filed for this visit.                                            BP Readings from Last 3 Encounters:   23 120/78   23 115/74   23 135/81       NPO Status:                                                                                 BMI:   Wt Readings from Last 3 Encounters:   23 217 lb (98.4 kg)   23 219 lb 12.8 oz (99.7 kg)   23 212 lb (96.2 kg)     There is no height or weight on file to calculate BMI.    CBC:   Lab Results   Component Value Date/Time    WBC 7.4 10/19/2021 05:35 PM    RBC 4.75 10/19/2021 05:35 PM    HGB 14.9 10/19/2021 05:35 PM    HCT 44.5 10/19/2021 05:35 PM    MCV 93.6 10/19/2021 05:35 PM    RDW 13.5 10/19/2021 05:35 PM     10/19/2021 05:35 PM       CMP:   Lab Results   Component Value Date/Time     10/19/2021 05:35 PM    K 4.1 10/19/2021 05:35 PM     10/19/2021 05:35 PM    CO2 29 10/19/2021 05:35 PM    BUN 22 10/19/2021 05:35 PM CREATININE 0.8 10/19/2021 05:35 PM    LABGLOM 87 09/11/2021 08:08 AM    GLUCOSE 90 10/19/2021 05:35 PM    GLUCOSE 92 03/22/2012 09:04 AM    PROT 7.6 10/19/2021 05:35 PM    CALCIUM 9.8 09/11/2021 08:08 AM    BILITOT 0.5 10/19/2021 05:35 PM    ALKPHOS 59 10/19/2021 05:35 PM    AST 17 10/19/2021 05:35 PM    ALT 28 10/19/2021 05:35 PM       POC Tests: No results for input(s): POCGLU, POCNA, POCK, POCCL, POCBUN, POCHEMO, POCHCT in the last 72 hours. Coags: No results found for: PROTIME, INR, APTT    HCG (If Applicable): No results found for: PREGTESTUR, PREGSERUM, HCG, HCGQUANT     ABGs: No results found for: PHART, PO2ART, AUL1OMQ, WWM1KZZ, BEART, V5XAGNTC     Type & Screen (If Applicable):  No results found for: LABABO, LABRH    Drug/Infectious Status (If Applicable):  No results found for: HIV, HEPCAB    COVID-19 Screening (If Applicable): No results found for: COVID19        Anesthesia Evaluation  Patient summary reviewed and Nursing notes reviewed no history of anesthetic complications:   Airway: Mallampati: II          Dental:          Pulmonary: breath sounds clear to auscultation  (+) COPD:                             Cardiovascular:            Rhythm: regular  Rate: normal                    Neuro/Psych:   (+) headaches:, psychiatric history:            GI/Hepatic/Renal:             Endo/Other:    (+) hypothyroidism::., .                 Abdominal:             Vascular: Other Findings:           Anesthesia Plan      MAC     ASA 2       Induction: intravenous. Anesthetic plan and risks discussed with patient. Plan discussed with CRNA.                     Randi Watters DO   2/23/2023

## 2023-02-23 NOTE — H&P
H&P      Seamus Gonzales is a 56 y.o. female seen in the clinic initially on 1/25/2023 upon request from Dr Oneida Solis for her history of low back pain. She has personal medical history of hypothyroidism and, vitamin D deficiency, depression, anxiety, current smoker. Recommended transforaminal lumbar epidural steroid injection right side at L4-5, and physical therapy. Patient presents with complaints of bilateral low back pain, right side is worse. She states it is going to her buttocks and posterior thighs. She states it also goes up a little bit on her right side to her mid back. She states her pain does not go past her knees at all. She reports that pain has been occurring for 2 years. She denies any accident, injury, falls that caused her pain to start. She describes the pain as tingling, jarring, spasms, electrical current, constant nagging pain. She states it varies in sensations and intensity but is always there. She states pain is worsened with doing dishes, bending, walking, lifting, twisting, standing for long periods. She states pain is alleviated somewhat with TENS unit, ice, rest, therapy. She states she is working with physical therapy at this time at [place] in [facility]. She reports she has had 2 weeks of sessions, and she has noticed some relief. She states she feels like she is getting improvement of her stiffness and spasms. She states he continues to do home exercises with some relief. She also states she has gone to chiropractor in the past, goes about monthly with minimal relief but it does not last.  She states she does feel weakness in her legs, right leg is worse. She denies any falls or use of a cane. She states pain will wake her up sometimes at night with certain movements. She feels like pain is interfering with her daily life, work. She states she works as a  at [employer], where she has to bend, lift, twisting exacerbates her pain.   She denies any numbness, tingling, loss of bowel or bladder control. Pain scale : at worst pain is 10/10, at best pain is 2/10. History of Interventions:   Surgery: No previous lumbar surgeries  Injections: None     Current Treatment Medications:   Excedrin - mild relief   Tylenol - mild relief     Historical Treatment Medications:   Tramadol - relief  Norco - relief   Diclofenac - no relief  Toradol - unsure  Prednisone - with relief  Flexeril - S/E dizzy, no relief      Imaging:  Lumbar x-ray 11/1/2022  PROCEDURE: XR LUMBAR SPINE (2-3 VIEWS)       CLINICAL INFORMATION: low back pain,       COMPARISON: No prior study. TECHNIQUE: AP and lateral views of the lumbar spine. FINDINGS:               The lumbar vertebral bodies are normally aligned. There are no compression fractures. There is disc space narrowing at T11-12, T12-L1, L1-2, L2-3, L3-4 and L5-S1. [place] The facet joints are within normal limits. No suspicious osseous lesions are present. There is degenerative change involving the sacroiliac joints bilaterally. .  No paraspinal abnormality is noted. Impression       1. Lumbar spondylosis at T11-12, T12-L1, L1-2, L2-3, L3-4 and L5-S1.   2. Degenerative change involving the sacroiliac joints bilaterally. MRI lumbar 11/11/2022  PROCEDURE: MRI LUMBAR SPINE WO CONTRAST       CLINICAL INFORMATION: Lumbar spondylosis, Acute bilateral low back pain with bilateral sciatica, Acute bilateral low back pain with bilateral sciatica, Degenerative joint disease of sacroiliac joint ([address] COMPARISON: Plain radiographs dated 11/1/2022. .       TECHNIQUE: Sagittal and axial T1 and T2-weighted images were obtained through the lumbar spine. FINDINGS:           The lumbar vertebral bodies are normally aligned. There is degenerative change in the vertebral body endplates adjacent to the T12-L1, L3-4 disc spaces. .  There is no bone marrow edema. There are no compression fractures.   No pars defects are noted. The distal spinal cord, conus medullaris and cauda equina are normal.        There are no gross abnormalities in the visualized aspects of the distal thoracic spine. On the axial images, at T12-L1, there is a 1.7 mm bulging disc and facet hypertrophy. This results in mild canal and mild-to-moderate bilateral foraminal stenosis. At L1-L2, there is a 1.2 mm bulging disc and facet hypertrophy. This results in mild canal and mild-to-moderate bilateral foraminal stenosis. At L2-L3, there is no disc herniation, canal or foraminal stenosis. At L3-L4, there is a 1.8 mm bulging disc and facet hypertrophy. This results in mild-to-moderate canal and bilateral foraminal stenosis, left greater than right       At L4-L5, there is a 1.7 mm bulging disc and facet hypertrophy. This results in mild canal and moderate bilateral foraminal stenosis. At L5-S1, there is a 1.2 mm bulging disc and facet hypertrophy this results in mild canal and bilateral foraminal stenosis. There is degenerative change involving the sacroiliac joints bilaterally. Impression   1. Degenerative changes resulting in mild-to-moderate canal and bilateral foraminal stenosis, left greater than right at L3-4.   2. There is mild canal and moderate bilateral foraminal stenosis at L4-5.   3. There is mild canal and mild-to-moderate bilateral foraminal stenosis at T12-L1 and L1-2.   4. There is mild canal and bilateral foraminal stenosis at L5-S1.   5. There is degenerative change involving the sacroiliac joints bilaterally.              Past Medical History        Past Medical History:   Diagnosis Date    Headache(784.0)              Past Surgical History         Past Surgical History:   Procedure Laterality Date    ANKLE FUSION        CARPAL TUNNEL RELEASE Right       SECTION        ENDOMETRIAL ABLATION        TOOTH EXTRACTION   2020            Family History   No family history on file.           Medications & Allergies:        Current Outpatient Medications   Medication Instructions    aspirin-acetaminophen-caffeine (EXCEDRIN MIGRAINE) 250-250-65 MG per tablet 1 tablet, Oral, EVERY 6 HOURS PRN    diclofenac (VOLTAREN) 75 mg, Oral, 2 TIMES DAILY    Escitalopram Oxalate (LEXAPRO PO) 20 mg, Oral    ketorolac (TORADOL) 10 mg, Oral, EVERY 6 HOURS PRN    levothyroxine (SYNTHROID) 50 mcg, Oral, DAILY    predniSONE (DELTASONE) 10 MG tablet 4 tabs po x 1 day, 3 tabs po x 1 day, 2 tabs po  X 1 day, 1 tab po x 1 day. Allergies   Allergen Reactions    Augmentin [Amoxicillin-Pot Clavulanate]         GI upset    Chantix [Varenicline]         Nausea, sweats, chills    Penicillins Rash         Review of Systems:   Constitutional: negative for weight changes or fevers  Genitourinary: negative for bowel/bladder incontinence   Musculoskeletal: positive for low back pain, SI pain  Neurological: Positive for bilateral leg weakness, negative for numbness  Behavioral/Psych: Positive for anxiety/depression   All other systems reviewed and are negative     Objective: There were no vitals filed for this visit. Constitutional: Pleasant, no acute distress   Head: Normocephalic, atraumatic   Eyes: Conjunctivae normal   Neck: Supple, symmetrical   Lungs: Normal respiratory effort, non-labored breathing   Cardiovascular: Limbs warm and well perfused   Abdomen: Non-protruded   Musculoskeletal: Muscle bulk symmetric, no atrophy, no gross deformities   · Lumbar Spine: ROM reduced. Lumbar paraspinals tender to palpation bilaterally. SLR neg bilaterally. CHRIS pos bilaterally. GAENSLEN pos bilaterally. Negative facet loading bilaterally. Bilateral SI joints tender to palpation. SI Distraction maneuver positive on left/right side. Neurological: Cranial nerves II-XII grossly intact. · Gait - Normal, non-antalgic gait. Ambulates without assistive device.    · Motor: 4/5 muscle strength in bilateral hip flexion, 5/5 bilateral knee flexion, knee extension, ankle dorsiflexion, and ankle plantar flexion   · Sensory: LT sensation intact in lower limbs   Skin: No rashes or lesions present   Psychological: Cooperative, no exaggerated pain behaviors      Assessment:     Diagnosis Orders   1. Neural foraminal stenosis of lumbosacral spine          2. Spinal stenosis of lumbar region without neurogenic claudication          3. Lumbar radiculopathy          4. Lumbar spondylosis          5. Facet hypertrophy of lumbar region          6. SI (sacroiliac) joint dysfunction          7. Sacroiliac joint pain          8. DDD (degenerative disc disease), lumbosacral          9. Chronic pain syndrome                Rubi Cruz is a 56 y. o.female presenting to the pain clinic for evaluation of low back pain. She is noted to have radicular symptoms, lumbar axial facet mediated, as well as bilateral SI joint dysfunction. Per recommendations of  I can see her with Ortho neuro, will start with a transforaminal lumbar epidural steroid injection at right L4-5 with Dr. Meenu Moss. Did discuss possible injections in the future to assist with pain control such as lumbar facet series at L4-5, L5-S1 bilaterally, bilateral SI joint injections, as well as interlaminar lumbar epidural steroid injection at L4-5. Also discussed trial trigger point injections today to assist with myofascial pain and spasms. She is agreeable to this, see note below. I have also started her on tramadol 50 mg daily as needed for severe pain (7-10/10). I will see her back 3 to 4 weeks after procedures completed        Plan: The following treatment recommendations and plan were discussed in detail with Rubi Cruz. Imaging:   I have reviewed patients imaging of lumbar x-ray, lumbar MRI and results were discussed with patient today. Analgesics:    The side effect profile of long-term opioid use was discussed and recommended emphasis on multimodal strategies for pain management. Patient is taking Acetaminophen. Patient informed that the maximum amount of acetaminophen taken on a regular basis should only be 4000 mg per day. Patient is taking Excedrin. Patient is advised to take as prescribed and not take on an empty stomach. Adjuvants: For continued chronic pain with associated musculoskeletal component, the patient is advised to start tramadol 50 mg daily as needed for severe pain (7-10/10). Educated on side effects, tolerance, addiction, safe medication storage, office policy refill request     OARRS reviewed, pain contract agreement signed     Interventions: In presence of lumbosacral radiating pain, the option of transforaminal lumbar epidural steroid injection approach at right L4-5 was chosen. The risks and benefits were discussed in detail with the patient. Patient wants to proceed with the injection with Dr Carline Marie     Anticoagulation/NPO Recommendations:   Patient does need to hold any medications prior to the procedure. NSAIDs x 5 days  Patient will need to be NPO x 8 hours prior to the procedure. We will start an IV prior to the procedure     Multidisciplinary Pain Management:   In the presence of complex, chronic, and multi-factorial pain, the importance of a multidisciplinary approach to pain management in the patients management regimen was emphasized and discussed in great detail. PHYSICAL THERAPY: Patient is advised to see a physical therapist for gentle stretching exercises and conditioning exercises for management of pain. OBESITY: Patient is advised to seek nutrition consult to help in managing their weight to decrease its impact on pain. SMOKING: Impact of smoking in patient's pain was discussed and patient is counseled against smoking. The patient was also advised to continue physical therapy and stretching exercises at home and cognitive behavioral and/or group therapy.       Referrals: Prescriptions Written This Visit:   Tramadol 50 mg daily as needed     Follow-up:   3-4 weeks after injection

## 2023-02-23 NOTE — H&P
[facility]  History and Physical Update    Pt Name: Buck Cordova  MRN: [id number]  YOB: 1965  Date of evaluation: 2/23/2023      I have examined the patient and reviewed the H&P/Consult and there are no changes to the patient or plans.         Electronically signed by Willy Sinclair MD on 2/23/2023 at 12:13 PM

## 2023-02-23 NOTE — OP NOTE
Pre-Procedure Note    Patient Name: Germaine Davies   YOB: 1965  Medical Record Number: [id number]  Date: 23       Indication:  L-radiculopathy       Consent: On file. Vital Signs:   Vitals:    23 1110   BP: (!) 113/59   Pulse: 75   Resp: 16   Temp: 97.9 °F (36.6 °C)   SpO2: 95%       Past Medical History:   has a past medical history of Arthritis, Headache(784.0), and Thyroid disease. Past Surgical History:   has a past surgical history that includes  section; Endometrial ablation; Carpal tunnel release (Right); Ankle Fusion; and Tooth Extraction (2020). Pre-Sedation Documentation and Exam:   Vital signs have been reviewed (see flow sheet for vitals). Sedation/ Anesthesia Plan:   MAC    Patient is an appropriate candidate for plan of sedation: yes    Preoperative Diagnosis:  L-radiculopathy, L-spinal stenosis    Post-Op Dx: as above    Procedure Performed:  Transforaminal lumbar epidural steroid injection at the levels of L4 - L5 on the Right side under fluoroscopic guidance . Indication for the Procedure: The patient failed conservative management  for pain in the low back radiating to lower extremities. As the patient is not responding to conservative management and pain is interfering with activities of daily living, we decided to proceed with transforaminal lumbar epidural steroid injection as symptoms are mostly following the nerve root distribution. The procedure and the risks were discussed with the patient and informed consent was obtained. Procedure:     A meaningful communication was kept up with the patient throughout   the procedure. The patient is placed in prone position. The skin over the back was prepped and draped in sterile manner. Then the skin and deep tissues just above the tip of the transverse process of L-4 vertebra on Right side were infiltrated with about 5 ml of 1% lidocaine.  The #20-gauge, 3-1/2 inch Tuohy needle/#22-gauge, 5 inch spinal needle was inserted through the skin wheal  and under fluoroscopy guidance was directed such that the tip of the needle lies in the neuroforamina at about the 6 o'clock position under the pedicle of the L-4 vertebra. This was confirmed with AP and lateral views of the fluoroscopy. Then after negative aspiration a total of 1 ml of Omnipaque-180 was injected through the needle and spread of the contrast in the epidural space as well as along the nerve root was observed. Then after negative aspiration a total of 12 mg of Dexamethasone and 2 ml of 0.25%Marcaine MPF was injected through the needle. The needle is removed and a Band-Aid was placed over the needle  insertion site. EBL-0  The patient's vital signs remained stable and the patient tolerated the procedure well. The patient was discharged home in stable condition and will be followed in the pain clinic in the next few weeks for further planning.       Electronically signed by Kobi Domínguez MD on 2/23/23 at 12:14 PM EST

## 2023-02-23 NOTE — ANESTHESIA POSTPROCEDURE EVALUATION
Department of Anesthesiology  Postprocedure Note    Patient: Jeffry Heard  MRN: [id number]  YOB: 1965  Date of evaluation: 2/23/2023      Procedure Summary     Date: 02/23/23 Room / Location: [address]    Anesthesia Start: 4809 Anesthesia Stop: 7993    Procedure: right transforaminal lumbar 4-5 epidural steroid injection (Right) Diagnosis:       Neural foraminal stenosis of lumbosacral spine      (Neural foraminal stenosis of lumbosacral spine [M48.07])    Surgeons: Liam Eden MD Responsible Provider: Shilpa Gonzales DO    Anesthesia Type: MAC ASA Status: 2          Anesthesia Type: No value filed.     Fransisco Phase I:      Fransisco Phase II: Fransisco Score: 9      Anesthesia Post Evaluation    Patient location during evaluation: bedside  Patient participation: complete - patient participated  Level of consciousness: awake  Airway patency: patent  Nausea & Vomiting: no nausea  Complications: no  Cardiovascular status: hemodynamically stable  Respiratory status: acceptable  Hydration status: stable

## 2023-02-23 NOTE — PROGRESS NOTES
1228-Patient to Phase II. Report received from [facility] AND [facility]. Patient awake and alert. Vital signs obtained and stable. Respirations unlabored on room air. Patient denies pain and nausea. Denies numbness and tingling in extremities. Injection site open to air and no drainage noted. Patient instructed to stay in bed. Call light in reach. 1235-snack and drink provided    1250-Patient denies numbness or tingling, pedal push and pull equal and strong    1300-IV removed. IV fluids infused    1303-Patient sitting at the side of the bed and getting dressed. Stood with nurse at bedside and tolerated well.     1305-taken to car via wheelchair, home with family.  Discharged in stable condition, all belongings sent

## 2023-03-21 ENCOUNTER — OFFICE VISIT (OUTPATIENT)
Dept: PHYSICAL MEDICINE AND REHAB | Age: 58
End: 2023-03-21
Payer: COMMERCIAL

## 2023-03-21 VITALS
WEIGHT: 216 LBS | DIASTOLIC BLOOD PRESSURE: 64 MMHG | HEIGHT: 63 IN | BODY MASS INDEX: 38.27 KG/M2 | SYSTOLIC BLOOD PRESSURE: 106 MMHG

## 2023-03-21 DIAGNOSIS — M54.16 LUMBAR RADICULOPATHY: ICD-10-CM

## 2023-03-21 DIAGNOSIS — M48.07 NEURAL FORAMINAL STENOSIS OF LUMBOSACRAL SPINE: ICD-10-CM

## 2023-03-21 DIAGNOSIS — M79.18 MYOFASCIAL PAIN: ICD-10-CM

## 2023-03-21 DIAGNOSIS — M48.061 SPINAL STENOSIS OF LUMBAR REGION WITHOUT NEUROGENIC CLAUDICATION: ICD-10-CM

## 2023-03-21 DIAGNOSIS — M47.816 LUMBAR SPONDYLOSIS: ICD-10-CM

## 2023-03-21 DIAGNOSIS — M51.37 DDD (DEGENERATIVE DISC DISEASE), LUMBOSACRAL: ICD-10-CM

## 2023-03-21 DIAGNOSIS — M47.816 FACET HYPERTROPHY OF LUMBAR REGION: ICD-10-CM

## 2023-03-21 DIAGNOSIS — G89.4 CHRONIC PAIN SYNDROME: Primary | ICD-10-CM

## 2023-03-21 PROCEDURE — 99214 OFFICE O/P EST MOD 30 MIN: CPT | Performed by: NURSE PRACTITIONER

## 2023-03-21 RX ORDER — TRAMADOL HYDROCHLORIDE 50 MG/1
50 TABLET ORAL DAILY PRN
Qty: 30 TABLET | Refills: 0 | Status: SHIPPED | OUTPATIENT
Start: 2023-03-21 | End: 2023-04-20

## 2023-03-21 NOTE — PROGRESS NOTES
over 35 minutes evaluating this patient, reviewing previous notes and images and completing documentation.        Ricky Darden, APRN - CNP   Interventional Pain Management/PM&R   [place]

## 2023-04-06 ENCOUNTER — TELEPHONE (OUTPATIENT)
Dept: PHYSICAL MEDICINE AND REHAB | Age: 58
End: 2023-04-06

## 2023-04-06 ENCOUNTER — OFFICE VISIT (OUTPATIENT)
Dept: PHYSICAL MEDICINE AND REHAB | Age: 58
End: 2023-04-06
Payer: COMMERCIAL

## 2023-04-06 VITALS
WEIGHT: 216 LBS | BODY MASS INDEX: 38.27 KG/M2 | SYSTOLIC BLOOD PRESSURE: 118 MMHG | HEIGHT: 63 IN | DIASTOLIC BLOOD PRESSURE: 70 MMHG

## 2023-04-06 DIAGNOSIS — G89.4 CHRONIC PAIN SYNDROME: ICD-10-CM

## 2023-04-06 DIAGNOSIS — M79.18 MYOFASCIAL PAIN: ICD-10-CM

## 2023-04-06 DIAGNOSIS — M48.061 SPINAL STENOSIS OF LUMBAR REGION WITHOUT NEUROGENIC CLAUDICATION: ICD-10-CM

## 2023-04-06 DIAGNOSIS — M51.37 DDD (DEGENERATIVE DISC DISEASE), LUMBOSACRAL: ICD-10-CM

## 2023-04-06 DIAGNOSIS — M48.07 NEURAL FORAMINAL STENOSIS OF LUMBOSACRAL SPINE: ICD-10-CM

## 2023-04-06 DIAGNOSIS — M54.16 LUMBAR RADICULOPATHY: Primary | ICD-10-CM

## 2023-04-06 PROCEDURE — 99214 OFFICE O/P EST MOD 30 MIN: CPT | Performed by: NURSE PRACTITIONER

## 2023-04-06 RX ORDER — TRAMADOL HYDROCHLORIDE 50 MG/1
50 TABLET ORAL 2 TIMES DAILY PRN
Qty: 60 TABLET | Refills: 0 | Status: SHIPPED | OUTPATIENT
Start: 2023-04-06 | End: 2023-05-06

## 2023-04-06 RX ORDER — PREGABALIN 50 MG/1
50 CAPSULE ORAL 2 TIMES DAILY
Qty: 60 CAPSULE | Refills: 0 | Status: SHIPPED | OUTPATIENT
Start: 2023-04-06 | End: 2023-05-06

## 2023-04-20 ENCOUNTER — TELEPHONE (OUTPATIENT)
Dept: PHYSICAL MEDICINE AND REHAB | Age: 58
End: 2023-04-20

## 2023-04-20 ENCOUNTER — HOSPITAL ENCOUNTER (EMERGENCY)
Age: 58
Discharge: HOME | End: 2023-04-20
Attending: EMERGENCY MEDICINE
Payer: COMMERCIAL

## 2023-04-20 VITALS
WEIGHT: 220 LBS | HEIGHT: 63 IN | DIASTOLIC BLOOD PRESSURE: 73 MMHG | RESPIRATION RATE: 16 BRPM | HEART RATE: 84 BPM | OXYGEN SATURATION: 95 % | SYSTOLIC BLOOD PRESSURE: 113 MMHG | BODY MASS INDEX: 38.98 KG/M2 | TEMPERATURE: 98.1 F

## 2023-04-20 DIAGNOSIS — G89.29 EXACERBATION OF CHRONIC BACK PAIN: Primary | ICD-10-CM

## 2023-04-20 DIAGNOSIS — M54.9 EXACERBATION OF CHRONIC BACK PAIN: Primary | ICD-10-CM

## 2023-04-20 PROCEDURE — 6360000002 HC RX W HCPCS: Performed by: EMERGENCY MEDICINE

## 2023-04-20 PROCEDURE — 99284 EMERGENCY DEPT VISIT MOD MDM: CPT

## 2023-04-20 PROCEDURE — 96372 THER/PROPH/DIAG INJ SC/IM: CPT

## 2023-04-20 PROCEDURE — 6370000000 HC RX 637 (ALT 250 FOR IP): Performed by: EMERGENCY MEDICINE

## 2023-04-20 RX ORDER — KETOROLAC TROMETHAMINE 30 MG/ML
30 INJECTION, SOLUTION INTRAMUSCULAR; INTRAVENOUS ONCE
Status: COMPLETED | OUTPATIENT
Start: 2023-04-20 | End: 2023-04-20

## 2023-04-20 RX ORDER — ONDANSETRON 4 MG/1
4 TABLET, ORALLY DISINTEGRATING ORAL ONCE
Status: COMPLETED | OUTPATIENT
Start: 2023-04-20 | End: 2023-04-20

## 2023-04-20 RX ADMIN — HYDROMORPHONE HYDROCHLORIDE 1 MG: 1 INJECTION, SOLUTION INTRAMUSCULAR; INTRAVENOUS; SUBCUTANEOUS at 12:54

## 2023-04-20 RX ADMIN — KETOROLAC TROMETHAMINE 30 MG: 30 INJECTION, SOLUTION INTRAMUSCULAR; INTRAVENOUS at 12:53

## 2023-04-20 RX ADMIN — ONDANSETRON 4 MG: 4 TABLET, ORALLY DISINTEGRATING ORAL at 12:53

## 2023-04-20 ASSESSMENT — PAIN SCALES - GENERAL: PAINLEVEL_OUTOF10: 10

## 2023-04-20 ASSESSMENT — PAIN - FUNCTIONAL ASSESSMENT: PAIN_FUNCTIONAL_ASSESSMENT: 0-10

## 2023-04-20 ASSESSMENT — PAIN DESCRIPTION - LOCATION: LOCATION: BACK

## 2023-04-20 NOTE — ED NOTES
Pt resting, resp even and unlabored, skin pink, warm and dry. Pt rates pain a 1. Pt given discharge instructions and verbalizes understanding, pt released.       Scottie Hendrickson RN  04/20/23 [id number]

## 2023-04-20 NOTE — TELEPHONE ENCOUNTER
Pt .called to report she had to go to ER today d/t pain becoming intolerable in her low back Denies spasms. Taking otc meds with her Ultram. Had stopped taking the Lyrica after you started as became dizzy, confused,nauseated. Will list as an allergy. Has been calling off work as cannot tolerate standing and will ask the surgeon to put her on medical leave early before her back surgery is scheduled for 5/10 if you will not give her something else for pain. Had received a shot of Dilaudid in the ER.

## 2023-04-20 NOTE — ED PROVIDER NOTES
SI joint area. Neurological:      General: No focal deficit present. Mental Status: She is alert. Motor: No weakness. Coordination: Coordination normal.      Comments: She has normal sensorimotor examination of both lower extremities. She has normal DTRs. DIFFERENTIAL DIAGNOSIS:       DIAGNOSTIC RESULTS         LABS:   Labs Reviewed - No data to display    EMERGENCY DEPARTMENT COURSE:   Vitals:    Vitals:    04/20/23 1235 04/20/23 1344   BP: (!) 149/85 113/73   Pulse: 100 84   Resp: 16 16   Temp: 98.1 °F (36.7 °C)    TempSrc: Oral    SpO2: 99% 95%   Weight: 220 lb (99.8 kg)    Height: 5' 3\" (1.6 m)      MDM:  Patient presented with exacerbation of her chronic back pain. I reviewed her medical history from the medical records and from the patient herself. I reviewed her vital signs and interpreted them as normal.  I performed musculoskeletal neurological examination which was normal.  I ordered Dilaudid 1 mg IM, Toradol 30 mg IM and Zofran ODT 4 mg. Reevaluation at 12:45 PM:  She was awake and alert, she looks comfortable. She said that her pain became minimal.  She is stable for discharge. I discussed diagnosis and treatment plan with her. I gave her strict return instructions. FINAL IMPRESSION      1. Exacerbation of chronic back pain          DISPOSITION/PLAN   Discharged home in good condition.     PATIENT REFERRED TO:  FAISAL Carrillo - CNP  [address] [address]  [phone]    In 3 days        DISCHARGE MEDICATIONS:  New Prescriptions    No medications on file       (Please note that portions of this note were completed with a voice recognition program.  Efforts were made to edit the dictations but occasionally words are mis-transcribed.)    MD Samia Brock MD  04/20/23 2108

## 2023-04-20 NOTE — ED NOTES
Pt complains of lower back pain, states she is scheduled for back surgery on May 10th in [place]. Pt denies any injury or trauma this am, states she took a tramadol without relief. Pt tearful, resp even and unlabored, skin pink, warm and dry.       Nils Belcher RN  04/20/23 6669

## 2023-04-20 NOTE — TELEPHONE ENCOUNTER
Called pt. And told of [facility] recommendations . She says she just found out about the surgery this last week. Aware we are canceling her procedure with us on 4/27. Also aware to have surgeon office call with what they will prescribe pre and postop. Pt. To stop Ultram from us while taking the surgeon meds. Will extend refill on ultram length of surgeons meds given. Pt. Verbalized understanding.

## 2023-04-20 NOTE — TELEPHONE ENCOUNTER
I do not see that patient updated me that she was having surgery, and she is scheduled for LESI l5-s1 with Dr Luis Monet 4/27/2023. She cannot do both of these and will need to cancel procedures with us.      In regards to pain, she will need to discuss further with surgeon as he will take care of pre-op and post op needs

## 2023-07-12 ENCOUNTER — HOSPITAL ENCOUNTER (OUTPATIENT)
Dept: CT IMAGING | Age: 58
Discharge: HOME | End: 2023-07-12
Payer: COMMERCIAL

## 2023-07-12 DIAGNOSIS — Z98.1 ARTHRODESIS STATUS: ICD-10-CM

## 2023-07-12 PROCEDURE — 72131 CT LUMBAR SPINE W/O DYE: CPT

## 2023-07-28 ENCOUNTER — HOSPITAL ENCOUNTER (EMERGENCY)
Age: 58
Discharge: HOME | End: 2023-07-28
Attending: EMERGENCY MEDICINE
Payer: COMMERCIAL

## 2023-07-28 VITALS
OXYGEN SATURATION: 96 % | SYSTOLIC BLOOD PRESSURE: 116 MMHG | RESPIRATION RATE: 16 BRPM | TEMPERATURE: 97 F | HEIGHT: 63 IN | BODY MASS INDEX: 38.98 KG/M2 | WEIGHT: 220 LBS | HEART RATE: 92 BPM | DIASTOLIC BLOOD PRESSURE: 78 MMHG

## 2023-07-28 DIAGNOSIS — R21 RASH AND NONSPECIFIC SKIN ERUPTION: Primary | ICD-10-CM

## 2023-07-28 PROCEDURE — 99284 EMERGENCY DEPT VISIT MOD MDM: CPT

## 2023-07-28 PROCEDURE — 2580000003 HC RX 258: Performed by: EMERGENCY MEDICINE

## 2023-07-28 PROCEDURE — 6360000002 HC RX W HCPCS: Performed by: EMERGENCY MEDICINE

## 2023-07-28 PROCEDURE — 96372 THER/PROPH/DIAG INJ SC/IM: CPT

## 2023-07-28 RX ORDER — PREDNISONE 20 MG/1
40 TABLET ORAL 2 TIMES DAILY
Qty: 20 TABLET | Refills: 0 | Status: SHIPPED | OUTPATIENT
Start: 2023-07-28 | End: 2023-08-02

## 2023-07-28 RX ADMIN — METHYLPREDNISOLONE SODIUM SUCCINATE 80 MG: 125 INJECTION INTRAMUSCULAR; INTRAVENOUS at 16:50

## 2023-07-28 ASSESSMENT — PAIN - FUNCTIONAL ASSESSMENT: PAIN_FUNCTIONAL_ASSESSMENT: NONE - DENIES PAIN

## 2023-07-28 NOTE — ED NOTES
Pt resting, resp even and unlabored, skin pink, warm and dry. No reaction noted. Pt given discharge instructions and verbalizes understanding, pt released.       Sarmad Paris RN  07/28/23 1537

## 2023-07-28 NOTE — ED PROVIDER NOTES
[address]  Phone: [phone]    eMERGENCY dEPARTMENT eNCOUnter           [address]       Chief Complaint   Patient presents with    Rash       Nurses Notes reviewed and I agree except as noted in the HPI. HISTORY OF PRESENT ILLNESS    Parul Carlson is a 56 y.o. female who presents via private vehicle with above-mentioned complaints. She history of rash and itching. She denies known exposure to chemicals or plants. She denies difficulty breathing or swallowing. She denies fever or chills. REVIEW OF SYSTEMS     Negative except as mentioned above    PAST MEDICAL HISTORY    has a past medical history of Arthritis, Headache(784.0), and Thyroid disease. SURGICAL HISTORY      has a past surgical history that includes  section; Endometrial ablation; Carpal tunnel release (Right); Ankle Fusion; Tooth Extraction (2020); and Pain management procedure (Right, 2023). CURRENT MEDICATIONS       Previous Medications    ASPIRIN-ACETAMINOPHEN-CAFFEINE (EXCEDRIN MIGRAINE) 250-250-65 MG PER TABLET    Take 1 tablet by mouth every 6 hours as needed for Headaches    DIPHENHYDRAMINE (BENADRYL) 50 MG CAPSULE    Take 1 capsule by mouth nightly as needed    ESCITALOPRAM OXALATE (LEXAPRO PO)    Take 20 mg by mouth     FLUTICASONE (FLONASE) 50 MCG/ACT NASAL SPRAY    1 spray by Nasal route 2 times daily    LEVOTHYROXINE (SYNTHROID) 50 MCG TABLET    Take 1 tablet by mouth Daily    LORATADINE (CLARITIN REDITABS) 10 MG DISSOLVABLE TABLET    Take 1 tablet by mouth daily    PREGABALIN (LYRICA) 50 MG CAPSULE    Take 1 capsule by mouth 2 times daily for 30 days. Max Daily Amount: 100 mg       ALLERGIES     is allergic to lyrica [pregabalin], augmentin [amoxicillin-pot clavulanate], chantix [varenicline], and penicillins. FAMILY HISTORY     has no family status information on file. family history is not on file.     SOCIAL HISTORY

## 2023-07-28 NOTE — ED NOTES
Pt complains of a rash that started today. Pt complains of itching, denies fever, vomiting or diarrhea. Pt alert, resp even and unlabored, skin pink, warm and dry. Rash noted to extremities and abdomen.        Kyler Reyes RN  07/28/23 [address] [facility] [place]  07/28/23 7093

## 2023-07-28 NOTE — DISCHARGE INSTRUCTIONS
Please start prednisone prescription tomorrow morning  Please take Benadryl 50 mg every 8 hours as needed for itching.   Please do not drive or drink alcohol after taking Benadryl  Please return if worse or new symptoms

## 2023-10-28 ENCOUNTER — APPOINTMENT (OUTPATIENT)
Dept: GENERAL RADIOLOGY | Age: 58
End: 2023-10-28
Payer: COMMERCIAL

## 2023-10-28 ENCOUNTER — HOSPITAL ENCOUNTER (EMERGENCY)
Age: 58
Discharge: HOME | End: 2023-10-28
Attending: EMERGENCY MEDICINE
Payer: COMMERCIAL

## 2023-10-28 VITALS
DIASTOLIC BLOOD PRESSURE: 59 MMHG | RESPIRATION RATE: 16 BRPM | TEMPERATURE: 97.6 F | SYSTOLIC BLOOD PRESSURE: 129 MMHG | OXYGEN SATURATION: 94 % | HEIGHT: 63 IN | BODY MASS INDEX: 37.21 KG/M2 | WEIGHT: 210 LBS | HEART RATE: 91 BPM

## 2023-10-28 DIAGNOSIS — J45.31 MILD PERSISTENT REACTIVE AIRWAY DISEASE WITH ACUTE EXACERBATION: Primary | ICD-10-CM

## 2023-10-28 DIAGNOSIS — R05.1 ACUTE COUGH: ICD-10-CM

## 2023-10-28 PROCEDURE — 71046 X-RAY EXAM CHEST 2 VIEWS: CPT

## 2023-10-28 PROCEDURE — 6370000000 HC RX 637 (ALT 250 FOR IP): Performed by: EMERGENCY MEDICINE

## 2023-10-28 PROCEDURE — 99283 EMERGENCY DEPT VISIT LOW MDM: CPT

## 2023-10-28 RX ORDER — IPRATROPIUM BROMIDE AND ALBUTEROL SULFATE 2.5; .5 MG/3ML; MG/3ML
1 SOLUTION RESPIRATORY (INHALATION) ONCE
Status: COMPLETED | OUTPATIENT
Start: 2023-10-28 | End: 2023-10-28

## 2023-10-28 RX ORDER — PREDNISONE 10 MG/1
TABLET ORAL
Qty: 36 TABLET | Refills: 0 | Status: SHIPPED | OUTPATIENT
Start: 2023-10-28

## 2023-10-28 RX ORDER — GUAIFENESIN AND CODEINE PHOSPHATE 100; 10 MG/5ML; MG/5ML
5 SOLUTION ORAL 3 TIMES DAILY PRN
Qty: 45 ML | Refills: 0 | Status: SHIPPED | OUTPATIENT
Start: 2023-10-28 | End: 2023-10-31

## 2023-10-28 RX ORDER — ALBUTEROL SULFATE 90 UG/1
2 AEROSOL, METERED RESPIRATORY (INHALATION) EVERY 6 HOURS PRN
Qty: 18 G | Refills: 3 | Status: SHIPPED | OUTPATIENT
Start: 2023-10-28

## 2023-10-28 RX ORDER — AZITHROMYCIN 250 MG/1
TABLET, FILM COATED ORAL
Qty: 1 PACKET | Refills: 0 | Status: SHIPPED | OUTPATIENT
Start: 2023-10-28

## 2023-10-28 RX ADMIN — IPRATROPIUM BROMIDE AND ALBUTEROL SULFATE 1 DOSE: .5; 3 SOLUTION RESPIRATORY (INHALATION) at 14:54

## 2023-10-28 ASSESSMENT — PAIN SCALES - GENERAL: PAINLEVEL_OUTOF10: 8

## 2023-10-28 ASSESSMENT — PAIN DESCRIPTION - LOCATION: LOCATION: THROAT;HEAD

## 2023-10-28 ASSESSMENT — PAIN DESCRIPTION - PAIN TYPE: TYPE: ACUTE PAIN

## 2023-10-28 ASSESSMENT — PAIN - FUNCTIONAL ASSESSMENT: PAIN_FUNCTIONAL_ASSESSMENT: 0-10

## 2023-10-28 NOTE — DISCHARGE INSTRUCTIONS
Albuterol 4 times a day as needed for wheezing. Take prednisone daily. First dose today. Prednisone decreases inflammation and wheezing in your chest.  Take Zithromax antibiotics. Zithromax treats strep throat, ear infections, bronchitis and pneumonia related to bacteria. Take guaifenesin and codeine for cough. Do not drive or operate machinery while taking codeine. Call your primary care doctor for close follow-up.   Also follow-up with pulmonary referral.

## 2023-11-29 ENCOUNTER — APPOINTMENT (OUTPATIENT)
Dept: GENERAL RADIOLOGY | Age: 58
End: 2023-11-29
Payer: COMMERCIAL

## 2023-11-29 ENCOUNTER — HOSPITAL ENCOUNTER (EMERGENCY)
Age: 58
Discharge: HOME | End: 2023-11-29
Attending: EMERGENCY MEDICINE
Payer: COMMERCIAL

## 2023-11-29 VITALS
SYSTOLIC BLOOD PRESSURE: 135 MMHG | TEMPERATURE: 97.1 F | BODY MASS INDEX: 41.29 KG/M2 | DIASTOLIC BLOOD PRESSURE: 90 MMHG | HEART RATE: 94 BPM | WEIGHT: 233 LBS | OXYGEN SATURATION: 95 % | HEIGHT: 63 IN | RESPIRATION RATE: 16 BRPM

## 2023-11-29 DIAGNOSIS — M79.672 INTRACTABLE LEFT HEEL PAIN: Primary | ICD-10-CM

## 2023-11-29 PROCEDURE — 73610 X-RAY EXAM OF ANKLE: CPT

## 2023-11-29 PROCEDURE — 99283 EMERGENCY DEPT VISIT LOW MDM: CPT

## 2023-11-29 PROCEDURE — 6370000000 HC RX 637 (ALT 250 FOR IP): Performed by: EMERGENCY MEDICINE

## 2023-11-29 PROCEDURE — 73630 X-RAY EXAM OF FOOT: CPT

## 2023-11-29 RX ORDER — NAPROXEN 500 MG/1
500 TABLET ORAL 2 TIMES DAILY PRN
Qty: 30 TABLET | Refills: 0 | Status: SHIPPED | OUTPATIENT
Start: 2023-11-29

## 2023-11-29 RX ORDER — HYDROCODONE BITARTRATE AND ACETAMINOPHEN 5; 325 MG/1; MG/1
1 TABLET ORAL ONCE
Status: COMPLETED | OUTPATIENT
Start: 2023-11-29 | End: 2023-11-29

## 2023-11-29 RX ADMIN — HYDROCODONE BITARTRATE AND ACETAMINOPHEN 1 TABLET: 5; 325 TABLET ORAL at 11:30

## 2023-11-29 ASSESSMENT — PAIN SCALES - GENERAL: PAINLEVEL_OUTOF10: 8

## 2023-11-29 NOTE — ED NOTES
AVS rev'd with pt. And copy given,with note for work. Pulse regular. Extremities warm. Respirations regular and quiet. Mucous membranes pink & moist. Alert and oriented times 3. No nausea or vomiting. Range of motion within patient's limits. Skin pink, warm and dry. Calm and cooperative.       Dionicio Ken RN  11/29/23 5923

## 2023-11-29 NOTE — ED TRIAGE NOTES
Pt. Presents ambulatory to ED with c/o pain in knees, ankle and left foot, with spasms in left calf. PT. Voices she recently went back to work after being off for  months to have back surgery.

## 2023-12-27 ENCOUNTER — APPOINTMENT (OUTPATIENT)
Dept: GENERAL RADIOLOGY | Age: 58
End: 2023-12-27
Payer: COMMERCIAL

## 2023-12-27 ENCOUNTER — HOSPITAL ENCOUNTER (EMERGENCY)
Age: 58
Discharge: HOME | End: 2023-12-27
Attending: EMERGENCY MEDICINE
Payer: COMMERCIAL

## 2023-12-27 VITALS
HEART RATE: 85 BPM | TEMPERATURE: 97.8 F | WEIGHT: 230 LBS | DIASTOLIC BLOOD PRESSURE: 71 MMHG | SYSTOLIC BLOOD PRESSURE: 123 MMHG | RESPIRATION RATE: 16 BRPM | OXYGEN SATURATION: 96 % | BODY MASS INDEX: 40.75 KG/M2 | HEIGHT: 63 IN

## 2023-12-27 DIAGNOSIS — M62.838 CERVICAL PARASPINAL MUSCLE SPASM: Primary | ICD-10-CM

## 2023-12-27 DIAGNOSIS — M25.511 ACUTE PAIN OF RIGHT SHOULDER: ICD-10-CM

## 2023-12-27 PROCEDURE — 6360000002 HC RX W HCPCS: Performed by: EMERGENCY MEDICINE

## 2023-12-27 PROCEDURE — 6370000000 HC RX 637 (ALT 250 FOR IP): Performed by: EMERGENCY MEDICINE

## 2023-12-27 PROCEDURE — 99284 EMERGENCY DEPT VISIT MOD MDM: CPT

## 2023-12-27 PROCEDURE — 96372 THER/PROPH/DIAG INJ SC/IM: CPT

## 2023-12-27 PROCEDURE — 73030 X-RAY EXAM OF SHOULDER: CPT

## 2023-12-27 PROCEDURE — 72040 X-RAY EXAM NECK SPINE 2-3 VW: CPT

## 2023-12-27 RX ORDER — CYCLOBENZAPRINE HCL 10 MG
10 TABLET ORAL 3 TIMES DAILY PRN
Qty: 30 TABLET | Refills: 0 | Status: SHIPPED | OUTPATIENT
Start: 2023-12-27 | End: 2024-01-06

## 2023-12-27 RX ORDER — KETOROLAC TROMETHAMINE 30 MG/ML
30 INJECTION, SOLUTION INTRAMUSCULAR; INTRAVENOUS ONCE
Status: COMPLETED | OUTPATIENT
Start: 2023-12-27 | End: 2023-12-27

## 2023-12-27 RX ORDER — HYDROCODONE BITARTRATE AND ACETAMINOPHEN 5; 325 MG/1; MG/1
1 TABLET ORAL ONCE
Status: COMPLETED | OUTPATIENT
Start: 2023-12-27 | End: 2023-12-27

## 2023-12-27 RX ORDER — PREDNISONE 20 MG/1
TABLET ORAL
Qty: 12 TABLET | Refills: 0 | Status: SHIPPED | OUTPATIENT
Start: 2023-12-27

## 2023-12-27 RX ORDER — CYCLOBENZAPRINE HCL 10 MG
10 TABLET ORAL ONCE
Status: COMPLETED | OUTPATIENT
Start: 2023-12-27 | End: 2023-12-27

## 2023-12-27 RX ORDER — PREDNISONE 20 MG/1
60 TABLET ORAL ONCE
Status: COMPLETED | OUTPATIENT
Start: 2023-12-27 | End: 2023-12-27

## 2023-12-27 RX ORDER — HYDROCODONE BITARTRATE AND ACETAMINOPHEN 7.5; 325 MG/1; MG/1
1 TABLET ORAL EVERY 6 HOURS PRN
Qty: 12 TABLET | Refills: 0 | Status: SHIPPED | OUTPATIENT
Start: 2023-12-27 | End: 2023-12-30

## 2023-12-27 RX ADMIN — KETOROLAC TROMETHAMINE 30 MG: 30 INJECTION, SOLUTION INTRAMUSCULAR; INTRAVENOUS at 16:43

## 2023-12-27 RX ADMIN — PREDNISONE 60 MG: 20 TABLET ORAL at 15:38

## 2023-12-27 RX ADMIN — CYCLOBENZAPRINE 10 MG: 10 TABLET, FILM COATED ORAL at 15:38

## 2023-12-27 RX ADMIN — HYDROCODONE BITARTRATE AND ACETAMINOPHEN 1 TABLET: 5; 325 TABLET ORAL at 15:38

## 2023-12-27 NOTE — ED NOTES
Pt pink, warm and dry, breathing with ease. Moist heat encouraged. Prescriptions explained, pt states understanding. AVS reviewed. Denies questions or concerns. Pt remains alert and oriented. Pt discharged in stable condition.

## 2023-12-27 NOTE — ED TRIAGE NOTES
C/o pain of right side of neck into right shoulder. Neck feels numb and tingly into shoulder. Strong right radial pulse.

## 2023-12-27 NOTE — DISCHARGE INSTRUCTIONS
Medications as prescribed. Moist heat, gentle range of motion. Support your neck with a C-shaped pillow as needed. Further evaluation and management per your health care provider.
Patient expressed no known problems or needs

## 2024-01-06 ENCOUNTER — HOSPITAL ENCOUNTER (OUTPATIENT)
Age: 59
Discharge: HOME | End: 2024-01-06
Payer: COMMERCIAL

## 2024-01-06 DIAGNOSIS — M25.50 POLYARTHRALGIA: ICD-10-CM

## 2024-01-06 DIAGNOSIS — R53.83 FATIGUE, UNSPECIFIED TYPE: ICD-10-CM

## 2024-01-06 LAB
ANION GAP SERPL CALC-SCNC: 11 MEQ/L (ref 8–16)
BUN SERPL-MCNC: 16 MG/DL (ref 7–22)
CALCIUM SERPL-MCNC: 9.1 MG/DL (ref 8.5–10.5)
CHLORIDE SERPL-SCNC: 105 MEQ/L (ref 98–111)
CO2 SERPL-SCNC: 25 MEQ/L (ref 23–33)
CREAT SERPL-MCNC: 0.7 MG/DL (ref 0.4–1.2)
CRP SERPL-MCNC: 1.9 MG/DL (ref 0–1)
ERYTHROCYTE [SEDIMENTATION RATE] IN BLOOD BY WESTERGREN METHOD: 13 MM/HR (ref 0–20)
GFR SERPL CREATININE-BSD FRML MDRD: > 60 ML/MIN/1.73M2
GLUCOSE SERPL-MCNC: 104 MG/DL (ref 70–108)
POTASSIUM SERPL-SCNC: 4.2 MEQ/L (ref 3.5–5.2)
RHEUMATOID FACT SERPL-ACNC: < 10 IU/ML (ref 0–13)
SODIUM SERPL-SCNC: 141 MEQ/L (ref 135–145)
TSH SERPL DL<=0.005 MIU/L-ACNC: 1.89 UIU/ML (ref 0.4–4.2)

## 2024-01-06 PROCEDURE — 86140 C-REACTIVE PROTEIN: CPT

## 2024-01-06 PROCEDURE — 80048 BASIC METABOLIC PNL TOTAL CA: CPT

## 2024-01-06 PROCEDURE — 86430 RHEUMATOID FACTOR TEST QUAL: CPT

## 2024-01-06 PROCEDURE — 84443 ASSAY THYROID STIM HORMONE: CPT

## 2024-01-06 PROCEDURE — 85651 RBC SED RATE NONAUTOMATED: CPT

## 2024-01-06 PROCEDURE — 86038 ANTINUCLEAR ANTIBODIES: CPT

## 2024-01-06 PROCEDURE — 36415 COLL VENOUS BLD VENIPUNCTURE: CPT

## 2024-01-10 LAB — NUCLEAR IGG SER QL IA: NORMAL

## 2024-01-23 ENCOUNTER — OFFICE VISIT (OUTPATIENT)
Dept: PHYSICAL MEDICINE AND REHAB | Age: 59
End: 2024-01-23
Payer: COMMERCIAL

## 2024-01-23 VITALS
BODY MASS INDEX: 40.74 KG/M2 | WEIGHT: 229.94 LBS | SYSTOLIC BLOOD PRESSURE: 136 MMHG | HEIGHT: 63 IN | DIASTOLIC BLOOD PRESSURE: 72 MMHG

## 2024-01-23 DIAGNOSIS — G89.4 CHRONIC PAIN SYNDROME: ICD-10-CM

## 2024-01-23 DIAGNOSIS — M47.816 LUMBAR SPONDYLOSIS: ICD-10-CM

## 2024-01-23 DIAGNOSIS — M25.561 ACUTE PAIN OF RIGHT KNEE: Primary | ICD-10-CM

## 2024-01-23 DIAGNOSIS — M47.816 FACET HYPERTROPHY OF LUMBAR REGION: ICD-10-CM

## 2024-01-23 DIAGNOSIS — M48.061 SPINAL STENOSIS OF LUMBAR REGION WITHOUT NEUROGENIC CLAUDICATION: ICD-10-CM

## 2024-01-23 DIAGNOSIS — M53.3 SI (SACROILIAC) JOINT DYSFUNCTION: ICD-10-CM

## 2024-01-23 DIAGNOSIS — M53.3 SACROILIAC JOINT PAIN: ICD-10-CM

## 2024-01-23 DIAGNOSIS — M54.16 LUMBAR RADICULOPATHY: ICD-10-CM

## 2024-01-23 DIAGNOSIS — M25.562 ACUTE PAIN OF LEFT KNEE: ICD-10-CM

## 2024-01-23 DIAGNOSIS — M48.07 NEURAL FORAMINAL STENOSIS OF LUMBOSACRAL SPINE: ICD-10-CM

## 2024-01-23 DIAGNOSIS — M79.18 MYOFASCIAL PAIN: ICD-10-CM

## 2024-01-23 DIAGNOSIS — M51.37 DDD (DEGENERATIVE DISC DISEASE), LUMBOSACRAL: ICD-10-CM

## 2024-01-23 PROCEDURE — 99215 OFFICE O/P EST HI 40 MIN: CPT | Performed by: NURSE PRACTITIONER

## 2024-01-23 RX ORDER — CELECOXIB 100 MG/1
100 CAPSULE ORAL 2 TIMES DAILY
Qty: 60 CAPSULE | Refills: 0 | Status: SHIPPED | OUTPATIENT
Start: 2024-01-23 | End: 2024-02-22

## 2024-01-23 RX ORDER — HYDROCODONE BITARTRATE AND ACETAMINOPHEN 5; 325 MG/1; MG/1
1 TABLET ORAL DAILY PRN
Qty: 30 TABLET | Refills: 0 | Status: SHIPPED | OUTPATIENT
Start: 2024-01-23 | End: 2024-02-22

## 2024-01-23 NOTE — PROGRESS NOTES
[facility] PROFESSIONAL SERVS  [facility] NEUROSCIENCE AND REHABILITATION [address]  [facility] [address]  Dept: [phone]  Dept Fax: [phone]  Loc: [phone]    Visit Date: 1/23/2024    Functionality Assessment/Goals Worksheet     On a scale of 0 (Does not Interfere) to 10 (Completely Interferes)     1.  Which number describes how during the past week pain has interfered with       the following:  A.  General Activity:  9  B.  Mood: 9  C.  Walking Ability:  9  D.  Normal Work (Includes both work outside the home and housework):  9  E.  Relations with Other People:   3  F.  Sleep:   9  G.  Enjoyment of Life:   8    2.  Patient Prefers to Take their Pain Medications:     [x]  On a regular basis   []  Only when necessary    []  Does not take pain medications    3.  What are the Patient's Goals/Expectations for Visiting Pain Management?     [x]  Learn about my pain    [x]  Receive Medication   [x]  Physical Therapy     [x]  Treat Depression   []  Receive Injections    []  Treat Sleep   []  Deal with Anxiety and Stress   []  Treat Opoid Dependence/Addiction   []  Other:  
(degenerative disc disease), lumbosacral        6. Neural foraminal stenosis of lumbosacral spine        7. SI (sacroiliac) joint dysfunction        8. Sacroiliac joint pain        9. Facet hypertrophy of lumbar region        10. Lumbar spondylosis        11. Myofascial pain        12. Chronic pain syndrome              Becca Keating is a 58 y.o.female presenting to the pain clinic for evaluation of low back pain.  She is noted to have radicular symptoms, lumbar axial facet mediated, as well as bilateral SI joint dysfunction.     We had long discussion regarding multiple steroids over the last few months, need to give her body a break due to side effect profile of steroids and concerns with bone health, blood sugars, heart rate.  She voiced agreement understanding.  I did discuss with her being conservative this time and trialing medications to help assist with pain control.  I have started her on Celebrex 100 mg twice daily.  I have also taken over her Norco 5/325 daily as needed for severe pain (7-10/10).  I did discuss with her multiple medication potentials in the future, but we will start conservatively 1 medication at a time to help assist with pain control.  I have also ordered bilateral knee x-rays that she is not on file.  We did review lumbar CT, cervical x-ray, foot x-ray, ankle x-rays.  I did discuss potential injections in the future such as SI injections, lumbar epidural steroid injection, but we will wait at this time due to multiple steroids in the past few months.  I also did discuss with her potentially retrialing some therapy, potential aquatic therapy but we will wait and get some pain control at this time due to her significant amount of pain that she is in and unable to tolerate much at this time..  I will see her back in 4 weeks to reevaluate medications.    Plan:   The following treatment recommendations and plan were discussed in detail with Becca Keating.    Imaging:   I have

## 2024-02-01 ENCOUNTER — HOSPITAL ENCOUNTER (EMERGENCY)
Age: 59
Discharge: HOME | End: 2024-02-01
Attending: EMERGENCY MEDICINE
Payer: COMMERCIAL

## 2024-02-01 ENCOUNTER — APPOINTMENT (OUTPATIENT)
Dept: GENERAL RADIOLOGY | Age: 59
End: 2024-02-01
Payer: COMMERCIAL

## 2024-02-01 VITALS
TEMPERATURE: 99.1 F | SYSTOLIC BLOOD PRESSURE: 151 MMHG | BODY MASS INDEX: 50.14 KG/M2 | OXYGEN SATURATION: 96 % | DIASTOLIC BLOOD PRESSURE: 84 MMHG | RESPIRATION RATE: 16 BRPM | WEIGHT: 283 LBS | HEART RATE: 94 BPM | HEIGHT: 63 IN

## 2024-02-01 DIAGNOSIS — G89.29 ACUTE EXACERBATION OF CHRONIC LOW BACK PAIN: ICD-10-CM

## 2024-02-01 DIAGNOSIS — M54.50 ACUTE EXACERBATION OF CHRONIC LOW BACK PAIN: ICD-10-CM

## 2024-02-01 DIAGNOSIS — S30.0XXA LUMBAR CONTUSION, INITIAL ENCOUNTER: Primary | ICD-10-CM

## 2024-02-01 PROCEDURE — 99284 EMERGENCY DEPT VISIT MOD MDM: CPT

## 2024-02-01 PROCEDURE — 6360000002 HC RX W HCPCS: Performed by: EMERGENCY MEDICINE

## 2024-02-01 PROCEDURE — 72100 X-RAY EXAM L-S SPINE 2/3 VWS: CPT

## 2024-02-01 PROCEDURE — 96372 THER/PROPH/DIAG INJ SC/IM: CPT

## 2024-02-01 RX ORDER — KETOROLAC TROMETHAMINE 30 MG/ML
30 INJECTION, SOLUTION INTRAMUSCULAR; INTRAVENOUS ONCE
Status: COMPLETED | OUTPATIENT
Start: 2024-02-01 | End: 2024-02-01

## 2024-02-01 RX ADMIN — KETOROLAC TROMETHAMINE 30 MG: 30 INJECTION INTRAMUSCULAR; INTRAVENOUS at 10:06

## 2024-02-01 ASSESSMENT — PAIN SCALES - GENERAL
PAINLEVEL_OUTOF10: 9
PAINLEVEL_OUTOF10: 9

## 2024-02-01 ASSESSMENT — PAIN DESCRIPTION - LOCATION: LOCATION: BACK

## 2024-02-01 ASSESSMENT — PAIN DESCRIPTION - ORIENTATION: ORIENTATION: LOWER

## 2024-02-01 ASSESSMENT — PAIN DESCRIPTION - DESCRIPTORS: DESCRIPTORS: SHARP

## 2024-02-01 ASSESSMENT — PAIN - FUNCTIONAL ASSESSMENT: PAIN_FUNCTIONAL_ASSESSMENT: 0-10
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
## 2024-02-01 NOTE — ED NOTES
AVS printed and reviewed.  Patient not in room at this time, could not re-evaluate pain.  She already went to the car per .

## 2024-02-01 NOTE — DISCHARGE INSTRUCTIONS
Continue home medications.  Call your pain doctor or primary care doctor for further worsening symptoms or progression.

## 2024-02-02 ENCOUNTER — TELEPHONE (OUTPATIENT)
Dept: PHYSICAL MEDICINE AND REHAB | Age: 59
End: 2024-02-02

## 2024-02-02 NOTE — TELEPHONE ENCOUNTER
Can offer sooner appointment if she would like seen sooner. she just received Toradol injection at ED and XR show no acute findings. Cannot do any steroids as she has had significant amounts over the last few months.    Also Ice, heat, rest, over the counter medications, topicals. Continue medications as prescribed.

## 2024-02-02 NOTE — TELEPHONE ENCOUNTER
Notif. Pt. Of Lois EPSTEIN Response and at this time will wait a few more days and if pain not better will call and reschedule aptm sooner.
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
## 2024-02-03 ENCOUNTER — HOSPITAL ENCOUNTER (OUTPATIENT)
Age: 59
Discharge: HOME | End: 2024-02-03
Payer: COMMERCIAL

## 2024-02-03 ENCOUNTER — HOSPITAL ENCOUNTER (OUTPATIENT)
Dept: GENERAL RADIOLOGY | Age: 59
Discharge: HOME | End: 2024-02-03
Payer: COMMERCIAL

## 2024-02-03 DIAGNOSIS — M25.561 ACUTE PAIN OF RIGHT KNEE: ICD-10-CM

## 2024-02-03 DIAGNOSIS — M25.562 ACUTE PAIN OF LEFT KNEE: ICD-10-CM

## 2024-02-03 PROCEDURE — 73564 X-RAY EXAM KNEE 4 OR MORE: CPT

## 2024-02-05 ENCOUNTER — TELEPHONE (OUTPATIENT)
Dept: PHYSICAL MEDICINE AND REHAB | Age: 59
End: 2024-02-05

## 2024-02-05 NOTE — TELEPHONE ENCOUNTER
----- Message from FAISAL Howard - CNP sent at 2/5/2024  8:15 AM EST -----  Both knee XR are resulted and show no acute findings, will review at follow up

## 2024-02-08 ENCOUNTER — TELEPHONE (OUTPATIENT)
Dept: PHYSICAL MEDICINE AND REHAB | Age: 59
End: 2024-02-08
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
## 2024-02-13 ENCOUNTER — OFFICE VISIT (OUTPATIENT)
Dept: PHYSICAL MEDICINE AND REHAB | Age: 59
End: 2024-02-13
Payer: COMMERCIAL

## 2024-02-13 VITALS
DIASTOLIC BLOOD PRESSURE: 76 MMHG | BODY MASS INDEX: 50.14 KG/M2 | WEIGHT: 283 LBS | SYSTOLIC BLOOD PRESSURE: 116 MMHG | HEIGHT: 63 IN

## 2024-02-13 DIAGNOSIS — Z98.1 S/P LUMBAR FUSION: ICD-10-CM

## 2024-02-13 DIAGNOSIS — M53.3 SACROILIAC JOINT PAIN: Primary | ICD-10-CM

## 2024-02-13 DIAGNOSIS — M53.3 SI (SACROILIAC) JOINT DYSFUNCTION: ICD-10-CM

## 2024-02-13 DIAGNOSIS — R52 ACUTE PAIN: ICD-10-CM

## 2024-02-13 DIAGNOSIS — M79.18 MYOFASCIAL PAIN: ICD-10-CM

## 2024-02-13 DIAGNOSIS — G89.4 CHRONIC PAIN SYNDROME: ICD-10-CM

## 2024-02-13 PROCEDURE — 99215 OFFICE O/P EST HI 40 MIN: CPT | Performed by: NURSE PRACTITIONER

## 2024-02-13 RX ORDER — CELECOXIB 100 MG/1
100 CAPSULE ORAL 2 TIMES DAILY
Qty: 60 CAPSULE | Refills: 0 | Status: SHIPPED | OUTPATIENT
Start: 2024-02-13 | End: 2024-03-14

## 2024-02-13 RX ORDER — ORPHENADRINE CITRATE 100 MG/1
100 TABLET, EXTENDED RELEASE ORAL 2 TIMES DAILY
Qty: 20 TABLET | Refills: 0 | Status: SHIPPED | OUTPATIENT
Start: 2024-02-13 | End: 2024-02-23

## 2024-02-13 RX ORDER — HYDROCODONE BITARTRATE AND ACETAMINOPHEN 5; 325 MG/1; MG/1
1 TABLET ORAL EVERY 8 HOURS PRN
Qty: 90 TABLET | Refills: 0 | Status: SHIPPED | OUTPATIENT
Start: 2024-02-13 | End: 2024-03-14

## 2024-02-13 NOTE — PROGRESS NOTES
Functionality Assessment/Goals Worksheet     On a scale of 0 (Does not Interfere) to 10 (Completely Interferes)     1.  Which number describes how during the past week pain has interfered with           the following:  A.  General Activity:  2  B.  Mood: 2  C.  Walking Ability:  4  D.  Normal Work (Includes both work outside the home and housework):  3  E.  Relations with Other People:   3  F.  Sleep:   5  G.  Enjoyment of Life:   2    2.  Patient Prefers to Take their Pain Medications:     [x]  On a regular basis   []  Only when necessary    []  Does not take pain medications    3.  What are the Patient's Goals/Expectations for Visiting Pain Management?     [x]  Learn about my pain    [x]  Receive Medication   [x]  Physical Therapy     [x]  Treat Depression   [x]  Receive Injections    [x]  Treat Sleep   [x]  Deal with Anxiety and Stress   [x]  Treat Opoid Dependence/Addiction   []  Other:                                
Clavulanate]      GI upset    Chantix [Varenicline]      Nausea, sweats, chills    Penicillins Rash       Review of Systems:   Constitutional: negative for weight changes or fevers  Genitourinary: negative for bowel/bladder incontinence   Musculoskeletal: positive for low back pain, SI pain, bilateral knee pain, bilateral ankle pain  Neurological: Positive for bilateral leg weakness, negative for numbness  Behavioral/Psych: Positive for anxiety/depression   All other systems reviewed and are negative    Objective:     Vitals:    02/13/24 0956   BP: 116/76         Constitutional: Pleasant, no acute distress   Head: Normocephalic, atraumatic   Eyes: Conjunctivae normal   Neck: Supple, symmetrical   Lungs: Normal respiratory effort, non-labored breathing   Cardiovascular: Limbs warm and well perfused   Abdomen: Non-protruded   Musculoskeletal: Muscle bulk symmetric, no atrophy, no gross deformities   · Lumbar Spine: ROM reduced. Lumbar paraspinals tender to palpation bilaterally. SLR neg bilaterally. CHRIS pos bilaterally. GAENSLEN pos bilaterally.  Positive facet loading bilaterally. Bilateral SI joints tender to palpation. SI Distraction maneuver positive on left/right side.  Neurological: Cranial nerves II-XII grossly intact.   · Gait - Normal, non-antalgic gait. Ambulates without assistive device.   · Motor: 4/5 muscle strength in bilateral hip flexion, 5/5 bilateral knee flexion, knee extension, ankle dorsiflexion, and ankle plantar flexion   · Sensory: LT sensation intact in lower limbs   Skin: No rashes or lesions present   Psychological: Cooperative, no exaggerated pain behaviors     Assessment:    Diagnosis Orders   1. Sacroiliac joint pain  MRI SACRUM COCCYX W WO CONTRAST    HYDROcodone-acetaminophen (NORCO) 5-325 MG per tablet      2. SI (sacroiliac) joint dysfunction  MRI SACRUM COCCYX W WO CONTRAST    HYDROcodone-acetaminophen (NORCO) 5-325 MG per tablet      3. Chronic pain syndrome  MRI SACRUM COCCYX W

## 2024-02-26 ENCOUNTER — HOSPITAL ENCOUNTER (OUTPATIENT)
Dept: MRI IMAGING | Age: 59
Discharge: HOME | End: 2024-02-26
Payer: COMMERCIAL

## 2024-02-26 DIAGNOSIS — R52 ACUTE PAIN: ICD-10-CM

## 2024-02-26 DIAGNOSIS — M53.3 SI (SACROILIAC) JOINT DYSFUNCTION: ICD-10-CM

## 2024-02-26 DIAGNOSIS — M53.3 SACROILIAC JOINT PAIN: ICD-10-CM

## 2024-02-26 DIAGNOSIS — G89.4 CHRONIC PAIN SYNDROME: ICD-10-CM

## 2024-02-26 PROCEDURE — A9579 GAD-BASE MR CONTRAST NOS,1ML: HCPCS | Performed by: NURSE PRACTITIONER

## 2024-02-26 PROCEDURE — 6360000004 HC RX CONTRAST MEDICATION: Performed by: NURSE PRACTITIONER

## 2024-02-26 PROCEDURE — 72197 MRI PELVIS W/O & W/DYE: CPT

## 2024-02-26 RX ADMIN — GADOTERIDOL 20 ML: 279.3 INJECTION, SOLUTION INTRAVENOUS at 16:00

## 2024-02-27 ENCOUNTER — TELEPHONE (OUTPATIENT)
Dept: PHYSICAL MEDICINE AND REHAB | Age: 59
End: 2024-02-27

## 2024-02-27 NOTE — TELEPHONE ENCOUNTER
Notif. Pt. Of Lois response on MRI and unable to see her sooner as would need afternoon aptm. Will wait till 3/13 aptm to review therapy ect.

## 2024-02-27 NOTE — TELEPHONE ENCOUNTER
----- Message from FAISAL Howard - CNP sent at 2/27/2024  3:15 PM EST -----  MRI shows no acute fracture or changes from fusion,it does show muscle weakness/loss of muscle tone. She would most likely benefit from physical therapy again, but will review at follow up. Can offer sooner appointment if she would like

## 2024-03-13 ENCOUNTER — OFFICE VISIT (OUTPATIENT)
Dept: PHYSICAL MEDICINE AND REHAB | Age: 59
End: 2024-03-13
Payer: COMMERCIAL

## 2024-03-13 VITALS
HEIGHT: 63 IN | SYSTOLIC BLOOD PRESSURE: 112 MMHG | DIASTOLIC BLOOD PRESSURE: 78 MMHG | BODY MASS INDEX: 50.14 KG/M2 | WEIGHT: 283 LBS

## 2024-03-13 DIAGNOSIS — M51.37 DDD (DEGENERATIVE DISC DISEASE), LUMBOSACRAL: ICD-10-CM

## 2024-03-13 DIAGNOSIS — M48.061 SPINAL STENOSIS OF LUMBAR REGION WITHOUT NEUROGENIC CLAUDICATION: ICD-10-CM

## 2024-03-13 DIAGNOSIS — M48.07 NEURAL FORAMINAL STENOSIS OF LUMBOSACRAL SPINE: ICD-10-CM

## 2024-03-13 DIAGNOSIS — M47.816 FACET HYPERTROPHY OF LUMBAR REGION: ICD-10-CM

## 2024-03-13 DIAGNOSIS — M79.18 MYOFASCIAL PAIN: ICD-10-CM

## 2024-03-13 DIAGNOSIS — M54.16 LUMBAR RADICULOPATHY: ICD-10-CM

## 2024-03-13 DIAGNOSIS — R52 ACUTE PAIN: ICD-10-CM

## 2024-03-13 DIAGNOSIS — G89.4 CHRONIC PAIN SYNDROME: ICD-10-CM

## 2024-03-13 DIAGNOSIS — Z98.1 S/P LUMBAR FUSION: Primary | ICD-10-CM

## 2024-03-13 DIAGNOSIS — M47.816 LUMBAR SPONDYLOSIS: ICD-10-CM

## 2024-03-13 DIAGNOSIS — M25.562 ACUTE PAIN OF LEFT KNEE: ICD-10-CM

## 2024-03-13 DIAGNOSIS — M53.3 SACROILIAC JOINT PAIN: ICD-10-CM

## 2024-03-13 DIAGNOSIS — M25.561 ACUTE PAIN OF RIGHT KNEE: ICD-10-CM

## 2024-03-13 DIAGNOSIS — M53.3 SI (SACROILIAC) JOINT DYSFUNCTION: ICD-10-CM

## 2024-03-13 PROCEDURE — 99215 OFFICE O/P EST HI 40 MIN: CPT | Performed by: NURSE PRACTITIONER

## 2024-03-13 RX ORDER — HYDROCODONE BITARTRATE AND ACETAMINOPHEN 5; 325 MG/1; MG/1
1 TABLET ORAL EVERY 8 HOURS PRN
Qty: 90 TABLET | Refills: 0 | Status: SHIPPED | OUTPATIENT
Start: 2024-03-13 | End: 2024-04-12

## 2024-03-13 RX ORDER — ORPHENADRINE CITRATE 100 MG/1
100 TABLET, EXTENDED RELEASE ORAL 2 TIMES DAILY
Qty: 60 TABLET | Refills: 1 | Status: SHIPPED | OUTPATIENT
Start: 2024-03-13

## 2024-03-13 RX ORDER — CELECOXIB 100 MG/1
100 CAPSULE ORAL 2 TIMES DAILY
Qty: 60 CAPSULE | Refills: 1 | Status: SHIPPED | OUTPATIENT
Start: 2024-03-13

## 2024-03-13 NOTE — PROGRESS NOTES
Functionality Assessment/Goals Worksheet     On a scale of 0 (Does not Interfere) to 10 (Completely Interferes)     1.  Which number describes how during the past week pain has interfered with           the following:  A.  General Activity:  5  B.  Mood: 3  C.  Walking Ability:  3  D.  Normal Work (Includes both work outside the home and housework):  5  E.  Relations with Other People:   3  F.  Sleep:   8  G.  Enjoyment of Life:   5    2.  Patient Prefers to Take their Pain Medications:     [x]  On a regular basis   []  Only when necessary    []  Does not take pain medications    3.  What are the Patient's Goals/Expectations for Visiting Pain Management?     [x]  Learn about my pain    [x]  Receive Medication   [x]  Physical Therapy     [x]  Treat Depression   []  Receive Injections    []  Treat Sleep   [x]  Deal with Anxiety and Stress   []  Treat Opoid Dependence/Addiction   []  Other:

## 2024-03-13 NOTE — PROGRESS NOTES
Chronic Pain/PM&R Clinic Note     Encounter Date: 3/13/24    Subjective:   Chief Complaint:   Chief Complaint   Patient presents with    Follow-up     Medication FU ,need refills, MRI       History of Present Illness:   Becca Keating is a 58 y.o. female seen in the clinic initially on 1/25/2023 upon request from Dr Ayers for her history of low back pain.  She has personal medical history of hypothyroidism and, vitamin D deficiency, depression, anxiety, current smoker.    She was referred for recommended transforaminal lumbar epidural steroid injection right side at L4-5, and physical therapy.    Patient presents with complaints of bilateral low back pain, right side is worse.  She states it is going to her buttocks and posterior thighs.  She states it also goes up a little bit on her right side to her mid back.  She states her pain does not go past her knees at all.  She reports that pain has been occurring for 2 years.  She denies any accident, injury, falls that caused her pain to start.  She describes the pain as tingling, jarring, spasms, electrical current, constant nagging pain.  She states it varies in sensations and intensity but is always there.  She states pain is worsened with doing dishes, bending, walking, lifting, twisting, standing for long periods.  She states pain is alleviated somewhat with TENS unit, ice, rest, therapy.  She states she is working with physical therapy at this time at [facility] in [place], [place].  She reports she has had 2 weeks of sessions, and she has noticed some relief.  She states she feels like she is getting improvement of her stiffness and spasms.  She states he continues to do home exercises with some relief.  She also states she has gone to chiropractor in the past, goes about monthly with minimal relief but it does not last.  She states she does feel weakness in her legs, right leg is worse.  She denies any falls or use of a cane.  She states pain will wake her

## 2024-03-26 ENCOUNTER — TELEPHONE (OUTPATIENT)
Dept: PHYSICAL MEDICINE AND REHAB | Age: 59
End: 2024-03-26

## 2024-03-26 NOTE — TELEPHONE ENCOUNTER
Letter completed.  Please make sure this gets printed off so I can sign it.  I do not want to do any injections as they could make her muscle weakness worse. Therapy is going to be difficult in the beginning and cause soreness. Make sure to try over-the-counter's ice, heat, topicals as well.

## 2024-03-26 NOTE — TELEPHONE ENCOUNTER
Notif. Pt letter done and she will  tomorrow and also of VIOLET Sharp NP response.  Pt. Verbalized understanding.

## 2024-03-26 NOTE — TELEPHONE ENCOUNTER
Pt. Called saying she did aquatic therapy last night and woke up in severe pain. Could not go to work and is requesting letter saying it is ok for her to be off today. Wants to know if should do a injection before resumes therapy or what else you suggest.

## 2024-04-03 ENCOUNTER — TELEPHONE (OUTPATIENT)
Dept: PHYSICAL MEDICINE AND REHAB | Age: 59
End: 2024-04-03

## 2024-04-03 NOTE — TELEPHONE ENCOUNTER
On tues. Pt. Was at work and her legs gave out d/t pain / weakness. She has been off work since and feels she cannot tolerate work. Her aptm with you is 4/11. She wants to know what she is to do till then. Needs a letter so can be off work from 4/2/24 thru 4/11/24 when she sees you.  Says will loose her job if does not have. Does not have any more sick days.Will  once done.

## 2024-04-03 NOTE — TELEPHONE ENCOUNTER
Patient called asking for a work note, patient thought appt was tomorrow but it is until next week and provider does not have any sooner openings, states she is having pains and cannot go to work due to physical condition, [phone] is a fax number to send the work note if possible to write her out one

## 2024-04-03 NOTE — TELEPHONE ENCOUNTER
I would recommend that patient goes to the ER due to falls. When I last saw her she was doing better with her SI pain, which is what we initially started addressing and was complaining of some knee pain. I cannot write her off work and if she is falling and having severe weakness she needs to be evaluated at ER

## 2024-04-11 ENCOUNTER — OFFICE VISIT (OUTPATIENT)
Dept: PHYSICAL MEDICINE AND REHAB | Age: 59
End: 2024-04-11
Payer: COMMERCIAL

## 2024-04-11 VITALS
DIASTOLIC BLOOD PRESSURE: 78 MMHG | WEIGHT: 283 LBS | SYSTOLIC BLOOD PRESSURE: 110 MMHG | HEIGHT: 63 IN | BODY MASS INDEX: 50.14 KG/M2

## 2024-04-11 DIAGNOSIS — M53.3 SACROILIAC JOINT PAIN: ICD-10-CM

## 2024-04-11 DIAGNOSIS — M47.816 LUMBAR SPONDYLOSIS: ICD-10-CM

## 2024-04-11 DIAGNOSIS — M54.16 LUMBAR RADICULOPATHY: ICD-10-CM

## 2024-04-11 DIAGNOSIS — G89.4 CHRONIC PAIN SYNDROME: ICD-10-CM

## 2024-04-11 DIAGNOSIS — M25.562 ACUTE PAIN OF LEFT KNEE: ICD-10-CM

## 2024-04-11 DIAGNOSIS — M48.061 SPINAL STENOSIS OF LUMBAR REGION WITHOUT NEUROGENIC CLAUDICATION: ICD-10-CM

## 2024-04-11 DIAGNOSIS — Z98.1 S/P LUMBAR FUSION: Primary | ICD-10-CM

## 2024-04-11 DIAGNOSIS — M53.3 SI (SACROILIAC) JOINT DYSFUNCTION: ICD-10-CM

## 2024-04-11 DIAGNOSIS — M48.07 NEURAL FORAMINAL STENOSIS OF LUMBOSACRAL SPINE: ICD-10-CM

## 2024-04-11 DIAGNOSIS — M62.5A2 ATROPHY OF MUSCLE OF BACK AT LUMBOSACRAL LEVEL: ICD-10-CM

## 2024-04-11 DIAGNOSIS — M25.561 ACUTE PAIN OF RIGHT KNEE: ICD-10-CM

## 2024-04-11 DIAGNOSIS — M51.37 DDD (DEGENERATIVE DISC DISEASE), LUMBOSACRAL: ICD-10-CM

## 2024-04-11 DIAGNOSIS — M79.18 MYOFASCIAL PAIN: ICD-10-CM

## 2024-04-11 DIAGNOSIS — R52 ACUTE PAIN: ICD-10-CM

## 2024-04-11 DIAGNOSIS — M47.816 FACET HYPERTROPHY OF LUMBAR REGION: ICD-10-CM

## 2024-04-11 PROCEDURE — 99215 OFFICE O/P EST HI 40 MIN: CPT | Performed by: NURSE PRACTITIONER

## 2024-04-11 RX ORDER — HYDROCODONE BITARTRATE AND ACETAMINOPHEN 5; 325 MG/1; MG/1
1 TABLET ORAL EVERY 8 HOURS PRN
Qty: 90 TABLET | Refills: 0 | Status: SHIPPED | OUTPATIENT
Start: 2024-04-14 | End: 2024-05-14

## 2024-04-11 RX ORDER — PREGABALIN 50 MG/1
50 CAPSULE ORAL 2 TIMES DAILY
Qty: 60 CAPSULE | Refills: 0 | Status: SHIPPED | OUTPATIENT
Start: 2024-04-11 | End: 2024-05-11

## 2024-04-11 NOTE — PROGRESS NOTES
Functionality Assessment/Goals Worksheet     On a scale of 0 (Does not Interfere) to 10 (Completely Interferes)     1.  Which number describes how during the past week pain has interfered with           the following:  A.  General Activity:  9  B.  Mood: 10  C.  Walking Ability:  9  D.  Normal Work (Includes both work outside the home and housework):  10  E.  Relations with Other People:   9  F.  Sleep:   9  G.  Enjoyment of Life:   10    2.  Patient Prefers to Take their Pain Medications:     [x]  On a regular basis   []  Only when necessary    []  Does not take pain medications    3.  What are the Patient's Goals/Expectations for Visiting Pain Management?     []  Learn about my pain    []  Receive Medication   []  Physical Therapy     []  Treat Depression   []  Receive Injections    []  Treat Sleep   [x]  Deal with Anxiety and Stress   []  Treat Opoid Dependence/Addiction   []  Other:                                
complaints.  Previous UDS was reviewed and is compliant. We will monitor compliance concerns and if there is any additional issues in the future as she has had breech of contract, this could be grounds for discharge.  I would like to see patient back in 4 weeks to reevaluate the Lyrica, at that time I will decrease her Norco to twice daily as needed.        Plan:   The following treatment recommendations and plan were discussed in detail with Becca Keating.    Imaging:   I have reviewed patient’s imaging of lumbar x-ray, lumbar MRI and results were discussed with patient today.     Analgesics:   The side effect profile of long-term opioid use was discussed and recommended emphasis on multimodal strategies for pain management.     Patient is taking Acetaminophen. Patient informed that the maximum amount of acetaminophen taken on a regular basis should only be 4000 mg per day.     Patient is to stop Celebrex 100 mg twice daily patient is advised to take as prescribed and not take on an empty stomach.     Adjuvants:   For continued chronic pain with associated musculoskeletal component, the patient is to start Norco 5/325 3 times daily as needed for severe pain (7-10/10).  Encouraged to use least amount possible, she is again instructed to not take any more than ordered.    Patient is to stop Norflex 100 mg twice daily.      Patient is educated on side effects, tolerance, addiction, safe medication storage, office policy refill request    UDS ordered (3/13/2024).  OARRS reviewed, pain contract agreement signed (3/13/2024)    Interventions:   None at this time    Anticoagulation/NPO Recommendations:   Patient does need to hold any medications prior to the procedure.   Patient will need to be NPO x 8 hours prior to the procedure.  We will start an IV prior to the procedure    Multidisciplinary Pain Management:   In the presence of complex, chronic, and multi-factorial pain, the importance of a multidisciplinary

## 2024-04-12 ENCOUNTER — TELEPHONE (OUTPATIENT)
Dept: PHYSICAL MEDICINE AND REHAB | Age: 59
End: 2024-04-12

## 2024-04-12 NOTE — TELEPHONE ENCOUNTER
Stop lyrica. No other medication adjustments at this time as the concern is worsening of her weakness/ do not want to cause falls or increase weakness.       Ok to fill norco 4/13

## 2024-04-12 NOTE — TELEPHONE ENCOUNTER
Notif. Pt. To stop Lyrica and no further adjustments in meds for now.  Ok for early fill for sat. 4/13 d/t pharmacy closed sun. 4/14.Pt. verbalized understanding Contacted pharmacy about also

## 2024-04-12 NOTE — TELEPHONE ENCOUNTER
Also pt. Called to say after taking one dose of the lyrica this am she became very lightheaded,whoozy and feeling unsteady on her feet within 1 hr of taking. Wants to know if should stop or cont. to see if symptoms will resolve. IF stop is she to take something else?

## 2024-04-12 NOTE — TELEPHONE ENCOUNTER
Script for norco you sent in yesterday at aptm with pt. Is due to be filled 4/14 and her pharmacy is closed on Sundays. Can I  call and ok early fill for sat. 4/13

## 2024-05-01 ENCOUNTER — HOSPITAL ENCOUNTER (OUTPATIENT)
Dept: CT IMAGING | Age: 59
Discharge: HOME | End: 2024-05-01
Payer: COMMERCIAL

## 2024-05-01 ENCOUNTER — HOSPITAL ENCOUNTER (OUTPATIENT)
Dept: MRI IMAGING | Age: 59
Discharge: HOME | End: 2024-05-01
Payer: COMMERCIAL

## 2024-05-01 DIAGNOSIS — M53.3 DISORDER OF SACRUM: ICD-10-CM

## 2024-05-01 DIAGNOSIS — M96.0 PSEUDARTHROSIS FOLLOWING SPINAL FUSION: ICD-10-CM

## 2024-05-01 DIAGNOSIS — M54.16 LUMBAR RADICULOPATHY: ICD-10-CM

## 2024-05-01 DIAGNOSIS — M96.1 POSTLAMINECTOMY SYNDROME, LUMBAR REGION: ICD-10-CM

## 2024-05-01 DIAGNOSIS — M96.0 PSEUDARTHROSIS AFTER FUSION OR ARTHRODESIS: ICD-10-CM

## 2024-05-01 DIAGNOSIS — M53.3 SACRO-ILIAC PAIN: ICD-10-CM

## 2024-05-01 PROCEDURE — 72131 CT LUMBAR SPINE W/O DYE: CPT

## 2024-05-01 PROCEDURE — 72148 MRI LUMBAR SPINE W/O DYE: CPT

## 2024-05-09 ENCOUNTER — OFFICE VISIT (OUTPATIENT)
Dept: PHYSICAL MEDICINE AND REHAB | Age: 59
End: 2024-05-09
Payer: COMMERCIAL

## 2024-05-09 VITALS
BODY MASS INDEX: 50.14 KG/M2 | SYSTOLIC BLOOD PRESSURE: 112 MMHG | HEIGHT: 63 IN | WEIGHT: 283 LBS | DIASTOLIC BLOOD PRESSURE: 68 MMHG

## 2024-05-09 DIAGNOSIS — Z98.1 S/P LUMBAR FUSION: ICD-10-CM

## 2024-05-09 DIAGNOSIS — M53.3 SI (SACROILIAC) JOINT DYSFUNCTION: ICD-10-CM

## 2024-05-09 DIAGNOSIS — M62.5A2 ATROPHY OF MUSCLE OF BACK AT LUMBOSACRAL LEVEL: ICD-10-CM

## 2024-05-09 DIAGNOSIS — M53.3 SACROILIAC JOINT PAIN: Primary | ICD-10-CM

## 2024-05-09 DIAGNOSIS — G89.4 CHRONIC PAIN SYNDROME: ICD-10-CM

## 2024-05-09 DIAGNOSIS — M79.18 MYOFASCIAL PAIN: ICD-10-CM

## 2024-05-09 PROCEDURE — 99215 OFFICE O/P EST HI 40 MIN: CPT | Performed by: NURSE PRACTITIONER

## 2024-05-09 RX ORDER — IBUPROFEN 800 MG/1
800 TABLET ORAL
Qty: 90 TABLET | Refills: 0 | Status: SHIPPED | OUTPATIENT
Start: 2024-05-09

## 2024-05-09 RX ORDER — HYDROCODONE BITARTRATE AND ACETAMINOPHEN 5; 325 MG/1; MG/1
1 TABLET ORAL DAILY PRN
Qty: 14 TABLET | Refills: 0 | Status: SHIPPED | OUTPATIENT
Start: 2024-05-13 | End: 2024-05-27

## 2024-05-09 NOTE — PROGRESS NOTES
Functionality Assessment/Goals Worksheet     On a scale of 0 (Does not Interfere) to 10 (Completely Interferes)     1.  Which number describes how during the past week pain has interfered with           the following:  A.  General Activity:  8  B.  Mood: 2  C.  Walking Ability:  6  D.  Normal Work (Includes both work outside the home and housework):  5  E.  Relations with Other People:   3  F.  Sleep:   6  G.  Enjoyment of Life:   6    2.  Patient Prefers to Take their Pain Medications:     [x]  On a regular basis   []  Only when necessary    []  Does not take pain medications    3.  What are the Patient's Goals/Expectations for Visiting Pain Management?     [x]  Learn about my pain    []  Receive Medication   []  Physical Therapy     []  Treat Depression   []  Receive Injections    []  Treat Sleep   []  Deal with Anxiety and Stress   []  Treat Opoid Dependence/Addiction   []  Other:                                
or foraminal stenosis at L4-5. Lucency in the spinous process of L4 which may represent a healing fracture. Please correlate clinically.  3. There is mild canal and mild-to-moderate bilateral foraminal stenosis at L3-4.  4. There is degenerative change involving the sacroiliac joints bilaterally.    PROCEDURE: MRI SACRUM COCCYX W WO CONTRAST     CLINICAL INFORMATION: Sacroiliac joint pain, SI (sacroiliac) joint dysfunction, Chronic pain syndrome, Acute pain     COMPARISON: None     TECHNIQUE: Multiplanar and multisequence MRI of the sacrum and coccyx  without and with  contrast.     CONTRAST: 20  mL of ProHance  intravenously.        FINDINGS:      ALIGNMENT: Anatomic.     BONES: Bone marrow signal is unremarkable. No evidence of acute fracture is seen.  No destructive bony lesion is noted.      SACROILIAC JOINTS:   1. The joint spaces are well-maintained.  2. There is no sclerosis.  3. There is no erosion.  4. There is no ankylosis.   5. No abnormal enhancement.     HIP JOINTS: Mild degenerative changes     TENDONS:  1. Hamstring tendons: Unremarkable  2. Iliopsoas tendons: Limited evaluation  3. Gluteus medius tendons: Limited evaluation  4. Gluteus minimus tendons: Limited evaluation  5. Tensor fascia marni: Unremarkable     TROCHANTERIC BURSA:   1. Mild inflammatory changes     SCIATIC NOTCH/SCIATIC NERVES: Intact.     NERVE ROOTS: Unremarkable     SOFT TISSUES AND PELVIC STRUCTURES:   1. The uterus is unremarkable.  2. The bladder is unremarkable..     OTHER FINDINGS:   1. There has been decompressive laminectomy at L4 and L5 with pedicle screw and misael fixation. Intervertebral disc prostheses at L4-L5.  2. There is extensive atrophy of the paraspinous musculature.        IMPRESSION:  1. The SI joints are unremarkable.  2. Extensive atrophy of the paraspinal muscles in the lower lumbar region     PROCEDURE: CT LUMBAR SPINE WO CONTRAST 5/1/2024     CLINICAL INFORMATION: Lumbar radiculopathy, Disorder of sacrum,

## 2024-05-20 ENCOUNTER — TELEPHONE (OUTPATIENT)
Dept: PHYSICAL MEDICINE AND REHAB | Age: 59
End: 2024-05-20

## 2024-05-20 DIAGNOSIS — M53.3 SI (SACROILIAC) JOINT DYSFUNCTION: ICD-10-CM

## 2024-05-20 DIAGNOSIS — M54.16 LUMBAR RADICULOPATHY: ICD-10-CM

## 2024-05-20 DIAGNOSIS — M47.816 LUMBAR SPONDYLOSIS: ICD-10-CM

## 2024-05-20 DIAGNOSIS — M62.5A2 ATROPHY OF MUSCLE OF BACK AT LUMBOSACRAL LEVEL: Primary | ICD-10-CM

## 2024-05-20 DIAGNOSIS — G89.4 CHRONIC PAIN SYNDROME: ICD-10-CM

## 2024-05-20 DIAGNOSIS — Z98.1 S/P LUMBAR FUSION: ICD-10-CM

## 2024-05-20 DIAGNOSIS — M79.18 MYOFASCIAL PAIN: ICD-10-CM

## 2024-05-20 NOTE — TELEPHONE ENCOUNTER
Please reach out to patient and let her know I did get in contact with Dr Iraheta' office and that he did agree and approve that she can return to therapy. I will order therapy again, is she ok to go back to PT at [facility] in [place]?

## 2024-05-29 DIAGNOSIS — M79.18 MYOFASCIAL PAIN: ICD-10-CM

## 2024-05-29 DIAGNOSIS — Z98.1 S/P LUMBAR FUSION: ICD-10-CM

## 2024-05-29 DIAGNOSIS — M53.3 SI (SACROILIAC) JOINT DYSFUNCTION: ICD-10-CM

## 2024-05-29 DIAGNOSIS — M53.3 SACROILIAC JOINT PAIN: ICD-10-CM

## 2024-05-29 DIAGNOSIS — M62.5A2 ATROPHY OF MUSCLE OF BACK AT LUMBOSACRAL LEVEL: ICD-10-CM

## 2024-05-29 DIAGNOSIS — G89.4 CHRONIC PAIN SYNDROME: ICD-10-CM

## 2024-05-29 RX ORDER — HYDROCODONE BITARTRATE AND ACETAMINOPHEN 5; 325 MG/1; MG/1
1 TABLET ORAL DAILY PRN
Qty: 14 TABLET | Refills: 0 | Status: SHIPPED | OUTPATIENT
Start: 2024-05-29 | End: 2024-06-12

## 2024-05-29 NOTE — TELEPHONE ENCOUNTER
Only filling for 14 days, she is to be using this very sparingly.  She just started back up with therapy yesterday, and she should not be utilizing this on a daily basis.  Will discuss further at follow-up visit

## 2024-05-29 NOTE — TELEPHONE ENCOUNTER
OARRS reviewed. UDS: + for  . Lexapro , hydrocodone  Last seen: 5/9/2024. Follow-up:   Future Appointments   Date Time Provider Department Center   6/20/2024 10:00 AM Lois Sharp APRN - CNP [facility] [place]    Request refill norco 5/325 . Last fill was for 14 days on 5/9 qd prn. Previous was for q8prn Setup for full 30 days- daily. Please adjust as desired

## 2024-06-12 ENCOUNTER — HOSPITAL ENCOUNTER (OUTPATIENT)
Age: 59
Discharge: HOME | End: 2024-06-12
Payer: COMMERCIAL

## 2024-06-12 DIAGNOSIS — G89.4 CHRONIC PAIN SYNDROME: ICD-10-CM

## 2024-06-12 DIAGNOSIS — Z98.1 S/P LUMBAR FUSION: ICD-10-CM

## 2024-06-12 DIAGNOSIS — Z00.00 WELLNESS EXAMINATION: ICD-10-CM

## 2024-06-12 DIAGNOSIS — M79.18 MYOFASCIAL PAIN: ICD-10-CM

## 2024-06-12 DIAGNOSIS — M53.3 SACROILIAC JOINT PAIN: ICD-10-CM

## 2024-06-12 DIAGNOSIS — E03.9 ACQUIRED HYPOTHYROIDISM: ICD-10-CM

## 2024-06-12 DIAGNOSIS — M53.3 SI (SACROILIAC) JOINT DYSFUNCTION: ICD-10-CM

## 2024-06-12 DIAGNOSIS — E66.01 CLASS 3 SEVERE OBESITY WITHOUT SERIOUS COMORBIDITY WITH BODY MASS INDEX (BMI) OF 40.0 TO 44.9 IN ADULT, UNSPECIFIED OBESITY TYPE (HCC): ICD-10-CM

## 2024-06-12 DIAGNOSIS — M62.5A2 ATROPHY OF MUSCLE OF BACK AT LUMBOSACRAL LEVEL: ICD-10-CM

## 2024-06-12 LAB
ALBUMIN SERPL BCG-MCNC: 4.5 G/DL (ref 3.5–5.1)
ALP SERPL-CCNC: 48 U/L (ref 38–126)
ALT SERPL W/O P-5'-P-CCNC: 17 U/L (ref 11–66)
ANION GAP SERPL CALC-SCNC: 12 MEQ/L (ref 8–16)
AST SERPL-CCNC: 14 U/L (ref 5–40)
BASOPHILS ABSOLUTE: 0.1 THOU/MM3 (ref 0–0.1)
BASOPHILS NFR BLD AUTO: 1.7 %
BILIRUB SERPL-MCNC: 0.3 MG/DL (ref 0.3–1.2)
BUN SERPL-MCNC: 19 MG/DL (ref 7–22)
CALCIUM SERPL-MCNC: 9.6 MG/DL (ref 8.5–10.5)
CHLORIDE SERPL-SCNC: 106 MEQ/L (ref 98–111)
CHOLEST SERPL-MCNC: 187 MG/DL (ref 100–199)
CO2 SERPL-SCNC: 25 MEQ/L (ref 23–33)
CREAT SERPL-MCNC: 0.8 MG/DL (ref 0.4–1.2)
DEPRECATED RDW RBC AUTO: 49.2 FL (ref 35–45)
EOSINOPHIL NFR BLD AUTO: 5.8 %
EOSINOPHILS ABSOLUTE: 0.3 THOU/MM3 (ref 0–0.4)
ERYTHROCYTE [DISTWIDTH] IN BLOOD BY AUTOMATED COUNT: 14.6 % (ref 11.5–14.5)
GFR SERPL CREATININE-BSD FRML MDRD: 86 ML/MIN/1.73M2
HCT VFR BLD AUTO: 43.5 % (ref 37–47)
HDLC SERPL-MCNC: 42 MG/DL
HGB BLD-MCNC: 14.4 GM/DL (ref 12–16)
IMM GRANULOCYTES # BLD AUTO: 0.02 THOU/MM3 (ref 0–0.07)
IMM GRANULOCYTES NFR BLD AUTO: 0.4 %
LDLC SERPL CALC-MCNC: 114 MG/DL
LYMPHOCYTES ABSOLUTE: 1.5 THOU/MM3 (ref 1–4.8)
LYMPHOCYTES NFR BLD AUTO: 32.5 %
MCH RBC QN AUTO: 30.8 PG (ref 26–33)
MCHC RBC AUTO-ENTMCNC: 33.1 GM/DL (ref 32.2–35.5)
MCV RBC AUTO: 93.1 FL (ref 81–99)
MONOCYTES ABSOLUTE: 0.4 THOU/MM3 (ref 0.4–1.3)
MONOCYTES NFR BLD AUTO: 9.2 %
NEUTROPHILS ABSOLUTE: 2.4 THOU/MM3 (ref 1.8–7.7)
NEUTROPHILS NFR BLD AUTO: 50.4 %
NRBC BLD AUTO-RTO: 0 /100 WBC
PLATELET # BLD AUTO: 212 THOU/MM3 (ref 130–400)
PMV BLD AUTO: 11.3 FL (ref 9.4–12.4)
POTASSIUM SERPL-SCNC: 4.3 MEQ/L (ref 3.5–5.2)
PROT SERPL-MCNC: 7.1 G/DL (ref 6.1–8)
RBC # BLD AUTO: 4.67 MILL/MM3 (ref 4.2–5.4)
SODIUM SERPL-SCNC: 143 MEQ/L (ref 135–145)
T4 FREE SERPL-MCNC: 0.87 NG/DL (ref 0.93–1.68)
TRIGL SERPL-MCNC: 156 MG/DL (ref 0–199)
TSH SERPL DL<=0.005 MIU/L-ACNC: 17.92 UIU/ML (ref 0.4–4.2)
WBC # BLD AUTO: 4.7 THOU/MM3 (ref 4.8–10.8)

## 2024-06-12 PROCEDURE — 80061 LIPID PANEL: CPT

## 2024-06-12 PROCEDURE — 84439 ASSAY OF FREE THYROXINE: CPT

## 2024-06-12 PROCEDURE — 83525 ASSAY OF INSULIN: CPT

## 2024-06-12 PROCEDURE — 84443 ASSAY THYROID STIM HORMONE: CPT

## 2024-06-12 PROCEDURE — 36415 COLL VENOUS BLD VENIPUNCTURE: CPT

## 2024-06-12 PROCEDURE — 83036 HEMOGLOBIN GLYCOSYLATED A1C: CPT

## 2024-06-12 PROCEDURE — 80053 COMPREHEN METABOLIC PANEL: CPT

## 2024-06-12 PROCEDURE — 85025 COMPLETE CBC W/AUTO DIFF WBC: CPT

## 2024-06-12 RX ORDER — IBUPROFEN 800 MG/1
800 TABLET ORAL
Qty: 90 TABLET | Refills: 0 | Status: SHIPPED | OUTPATIENT
Start: 2024-06-12

## 2024-06-12 RX ORDER — HYDROCODONE BITARTRATE AND ACETAMINOPHEN 5; 325 MG/1; MG/1
1 TABLET ORAL PRN
Qty: 8 TABLET | Refills: 0 | Status: SHIPPED | OUTPATIENT
Start: 2024-06-12 | End: 2024-07-10

## 2024-06-12 NOTE — TELEPHONE ENCOUNTER
OARRS reviewed. UDS: + for  Lexapro,hydrocodone.   Last seen: 5/9/2024. Follow-up:   Future Appointments   Date Time Provider Department Center   6/20/2024 10:00 AM Lois Sharp APRN - CNP [facility] [place]

## 2024-06-12 NOTE — TELEPHONE ENCOUNTER
She should only be taking this for therapy, we discussed at her last visit she is not to be taking every day as she is not working and should not be relying on narcotic medications. I have adjusted the script to show she should be taking only on days with therapy and sent in 8 tablets. She is attending therapy only two days a week, so this should be lasting her for 4 weeks. She is to be utilizing non narcotics to help with pain otherwise

## 2024-06-13 LAB
DEPRECATED MEAN GLUCOSE BLD GHB EST-ACNC: 114 MG/DL (ref 70–126)
HBA1C MFR BLD HPLC: 5.8 % (ref 4.4–6.4)
INSULIN SERPL-ACNC: 23 MU/L

## 2024-06-20 ENCOUNTER — OFFICE VISIT (OUTPATIENT)
Dept: PHYSICAL MEDICINE AND REHAB | Age: 59
End: 2024-06-20
Payer: COMMERCIAL

## 2024-06-20 VITALS — DIASTOLIC BLOOD PRESSURE: 85 MMHG | HEART RATE: 102 BPM | SYSTOLIC BLOOD PRESSURE: 132 MMHG | OXYGEN SATURATION: 96 %

## 2024-06-20 DIAGNOSIS — G89.4 CHRONIC PAIN SYNDROME: ICD-10-CM

## 2024-06-20 DIAGNOSIS — Z98.1 S/P LUMBAR FUSION: Primary | ICD-10-CM

## 2024-06-20 DIAGNOSIS — M79.18 MYOFASCIAL PAIN: ICD-10-CM

## 2024-06-20 DIAGNOSIS — M54.16 LUMBAR RADICULOPATHY: ICD-10-CM

## 2024-06-20 DIAGNOSIS — M48.07 NEURAL FORAMINAL STENOSIS OF LUMBOSACRAL SPINE: ICD-10-CM

## 2024-06-20 DIAGNOSIS — M48.061 SPINAL STENOSIS OF LUMBAR REGION WITHOUT NEUROGENIC CLAUDICATION: ICD-10-CM

## 2024-06-20 DIAGNOSIS — M47.816 FACET HYPERTROPHY OF LUMBAR REGION: ICD-10-CM

## 2024-06-20 DIAGNOSIS — M53.3 SI (SACROILIAC) JOINT DYSFUNCTION: ICD-10-CM

## 2024-06-20 DIAGNOSIS — M51.37 DDD (DEGENERATIVE DISC DISEASE), LUMBOSACRAL: ICD-10-CM

## 2024-06-20 DIAGNOSIS — M53.3 SACROILIAC JOINT PAIN: ICD-10-CM

## 2024-06-20 DIAGNOSIS — M47.816 LUMBAR SPONDYLOSIS: ICD-10-CM

## 2024-06-20 DIAGNOSIS — M62.5A2 ATROPHY OF MUSCLE OF BACK AT LUMBOSACRAL LEVEL: ICD-10-CM

## 2024-06-20 PROCEDURE — 99215 OFFICE O/P EST HI 40 MIN: CPT | Performed by: NURSE PRACTITIONER

## 2024-06-20 RX ORDER — AMITRIPTYLINE HYDROCHLORIDE 25 MG/1
25 TABLET, FILM COATED ORAL NIGHTLY
Qty: 30 TABLET | Refills: 0 | Status: SHIPPED | OUTPATIENT
Start: 2024-06-20

## 2024-06-20 RX ORDER — LEVOTHYROXINE SODIUM 0.07 MG/1
75 TABLET ORAL DAILY
COMMUNITY

## 2024-06-20 RX ORDER — HYDROCODONE BITARTRATE AND ACETAMINOPHEN 5; 325 MG/1; MG/1
1 TABLET ORAL PRN
Qty: 8 TABLET | Refills: 0 | Status: SHIPPED | OUTPATIENT
Start: 2024-06-20 | End: 2024-07-18

## 2024-06-20 NOTE — PROGRESS NOTES
Functionality Assessment/Goals Worksheet     On a scale of 0 (Does not Interfere) to 10 (Completely Interferes)     1.  Which number describes how during the past week pain has interfered with           the following:  A.  General Activity:  5  B.  Mood: 10  C.  Walking Ability:  9  D.  Normal Work (Includes both work outside the home and housework):  7  E.  Relations with Other People:   6  F.  Sleep:   9  G.  Enjoyment of Life:   0    2.  Patient Prefers to Take their Pain Medications:     [x]  On a regular basis   []  Only when necessary    []  Does not take pain medications    3.  What are the Patient's Goals/Expectations for Visiting Pain Management?     []  Learn about my pain    []  Receive Medication   []  Physical Therapy     []  Treat Depression   []  Receive Injections    []  Treat Sleep   []  Deal with Anxiety and Stress   []  Treat Opoid Dependence/Addiction   []  Other:                                
Went to spine surgeon Friday and things are not  healing properly. She states they suggest injections for the pain.    
following treatment recommendations and plan were discussed in detail with Becca Keating.    Imaging:   I have reviewed patient’s imaging of lumbar x-ray, lumbar MRI and results were discussed with patient today.     Analgesics:   The side effect profile of long-term opioid use was discussed and recommended emphasis on multimodal strategies for pain management.     Patient is taking Acetaminophen. Patient informed that the maximum amount of acetaminophen taken on a regular basis should only be 4000 mg per day.     Patient is to start ibuprofen 800 mg 3 times daily as needed.  Patient is advised to take as prescribed and not take on an empty stomach.  She is educated to not take with any additional NSAIDs    Adjuvants:   For continued chronic pain with associated musculoskeletal component, the patient is to start Alicia 5/325 on days with formal physical therapy as needed for severe pain (7-10/10).  Encouraged to use least amount possible, she is again instructed to not take any more than ordered.    Patient is to start Elavil 25 mg nightly    Patient is advised to take the medication as prescribed as taking more than prescribed can lead to the development of tolerance, physical dependency, and addiction.  Patient is advised to store and lock the medication in a safe and secure location.  If the medication is lost or stolen we will not provide early refills on this medication.  Patient understands that they must stay compliant with treatment plan outlined above in order to stay compliant with opioid therapy and any deviation from treatment plan will result in cessation of opioid therapy.  Risks of long-term opioid therapy were discussed in extensive detail with the patient and patient understands the risks involved with chronic, continuous opioids.      Patient has been compliant with office visits, rehabilitation plan including attending therapy as warranted, and injection therapy.  Patient has not

## 2024-07-18 DIAGNOSIS — G89.4 CHRONIC PAIN SYNDROME: ICD-10-CM

## 2024-07-18 DIAGNOSIS — M53.3 SI (SACROILIAC) JOINT DYSFUNCTION: ICD-10-CM

## 2024-07-18 DIAGNOSIS — Z98.1 S/P LUMBAR FUSION: ICD-10-CM

## 2024-07-18 DIAGNOSIS — M62.5A2 ATROPHY OF MUSCLE OF BACK AT LUMBOSACRAL LEVEL: ICD-10-CM

## 2024-07-18 DIAGNOSIS — M79.18 MYOFASCIAL PAIN: ICD-10-CM

## 2024-07-18 DIAGNOSIS — M53.3 SACROILIAC JOINT PAIN: ICD-10-CM

## 2024-07-18 RX ORDER — HYDROCODONE BITARTRATE AND ACETAMINOPHEN 5; 325 MG/1; MG/1
1 TABLET ORAL PRN
Qty: 8 TABLET | Refills: 0 | Status: SHIPPED | OUTPATIENT
Start: 2024-07-19 | End: 2024-08-16

## 2024-07-22 ENCOUNTER — TELEPHONE (OUTPATIENT)
Dept: PHYSICAL MEDICINE AND REHAB | Age: 59
End: 2024-07-22

## 2024-07-22 RX ORDER — AMITRIPTYLINE HYDROCHLORIDE 50 MG/1
50 TABLET, FILM COATED ORAL NIGHTLY
Qty: 30 TABLET | Refills: 0 | Status: SHIPPED | OUTPATIENT
Start: 2024-07-22

## 2024-07-22 NOTE — TELEPHONE ENCOUNTER
Lets go ahead and increase Elavil as the sleepiness she is having during the day can be from not sleeping at night. I will send in 50 mg elavil to [facility] in [place]. She can keep current appt to review then

## 2024-07-22 NOTE — TELEPHONE ENCOUNTER
Pt. Called tearful saying the new medicine you started her on is not doing anything for her pain. Wants to stop and put on a different med. If needed will be seen sooner that her current August 15th aptm. Your note indicated could titrate up if needed. Please advise.

## 2024-07-22 NOTE — TELEPHONE ENCOUNTER
She is on low dose Elavil, 25 mg nightly. We can increase this to 50 mg to see if this helps more. Is she tolerating it well? If so, I can send in the 50 mg nightly to try

## 2024-07-22 NOTE — TELEPHONE ENCOUNTER
Called pt. For clarification and she said she is having daytime dizziness and sleepiness though not getting any sleep d/t the pain on her right side that goes up into her right rib cage area. Do you want to see her sooner?

## 2024-08-07 ENCOUNTER — APPOINTMENT (OUTPATIENT)
Dept: CT IMAGING | Age: 59
End: 2024-08-07
Payer: COMMERCIAL

## 2024-08-07 ENCOUNTER — APPOINTMENT (OUTPATIENT)
Dept: MRI IMAGING | Age: 59
End: 2024-08-07
Payer: COMMERCIAL

## 2024-08-07 ENCOUNTER — HOSPITAL ENCOUNTER (OUTPATIENT)
Age: 59
Setting detail: OBSERVATION
Discharge: HOME | End: 2024-08-08
Attending: STUDENT IN AN ORGANIZED HEALTH CARE EDUCATION/TRAINING PROGRAM
Payer: COMMERCIAL

## 2024-08-07 DIAGNOSIS — R29.810 FACIAL DROOP: ICD-10-CM

## 2024-08-07 DIAGNOSIS — M26.622 ARTHRALGIA OF LEFT TEMPOROMANDIBULAR JOINT: ICD-10-CM

## 2024-08-07 DIAGNOSIS — R20.0 LEFT SIDED NUMBNESS: ICD-10-CM

## 2024-08-07 DIAGNOSIS — R47.81 SLURRED SPEECH: ICD-10-CM

## 2024-08-07 DIAGNOSIS — R29.90 STROKE-LIKE SYMPTOMS: Primary | ICD-10-CM

## 2024-08-07 DIAGNOSIS — K14.8 TONGUE DEVIATION: ICD-10-CM

## 2024-08-07 DIAGNOSIS — M26.69 CREPITUS OF LEFT TMJ ON OPENING OF JAW: ICD-10-CM

## 2024-08-07 LAB
ALBUMIN SERPL BCP-MCNC: 4.6 GM/DL (ref 3.4–5)
ALP SERPL-CCNC: 56 U/L (ref 46–116)
ALT SERPL W P-5'-P-CCNC: 40 U/L (ref 14–63)
ANION GAP SERPL CALC-SCNC: 8 MEQ/L (ref 8–16)
AST SERPL W P-5'-P-CCNC: 19 U/L (ref 15–37)
BASOPHILS # BLD: 1 % (ref 0–3)
BASOPHILS ABSOLUTE: 0 THOU/MM3 (ref 0–0.1)
BILIRUB SERPL-MCNC: 0.5 MG/DL (ref 0.2–1)
BUN SERPL-MCNC: 13 MG/DL (ref 7–18)
CALCIUM SERPL-MCNC: 9.6 MG/DL (ref 8.5–10.1)
CHLORIDE SERPL-SCNC: 103 MEQ/L (ref 98–107)
CHOLEST SERPL-MCNC: 164 MG/DL (ref 100–199)
CO2 SERPL-SCNC: 28 MEQ/L (ref 21–32)
CREAT SERPL-MCNC: 0.9 MG/DL (ref 0.6–1.3)
DEPRECATED MEAN GLUCOSE BLD GHB EST-ACNC: 96 MG/DL (ref 70–126)
EKG ATRIAL RATE: 78 BPM
EKG P AXIS: 37 DEGREES
EKG P-R INTERVAL: 172 MS
EKG Q-T INTERVAL: 398 MS
EKG QRS DURATION: 84 MS
EKG QTC CALCULATION (BAZETT): 453 MS
EKG R AXIS: -20 DEGREES
EKG T AXIS: 32 DEGREES
EKG VENTRICULAR RATE: 78 BPM
EOSINOPHILS ABSOLUTE: 0.2 THOU/MM3 (ref 0–0.5)
EOSINOPHILS RELATIVE PERCENT: 3.6 % (ref 0–4)
GFR SERPL CREATININE-BSD FRML MDRD: 75 ML/MIN/1.73M2
GLUCOSE BLD STRIP.AUTO-MCNC: 88 MG/DL (ref 70–108)
GLUCOSE BLD STRIP.AUTO-MCNC: 92 MG/DL (ref 70–108)
GLUCOSE SERPL-MCNC: 89 MG/DL (ref 74–106)
HBA1C MFR BLD HPLC: 5.2 % (ref 4.4–6.4)
HCT VFR BLD CALC: 43.9 % (ref 37–47)
HDLC SERPL-MCNC: 48 MG/DL
HEMOGLOBIN: 14.5 GM/DL (ref 12–16)
IMMATURE GRANS (ABS): 0 THOU/MM3 (ref 0–0.07)
IMMATURE GRANULOCYTES %: 0 %
INR BLD: 1.03 (ref 0.85–1.13)
LDLC SERPL CALC-MCNC: 84 MG/DL
LYMPHOCYTES # BLD AUTO: 37.7 % (ref 15–47)
LYMPHOCYTES ABSOLUTE: 1.8 THOU/MM3 (ref 1–4.8)
MCH RBC QN AUTO: 30.7 PG (ref 26–32)
MCHC RBC AUTO-ENTMCNC: 33 GM/DL (ref 31–35)
MCV RBC AUTO: 93 FL (ref 81–99)
MONOCYTES: 0.4 THOU/MM3 (ref 0.3–1.3)
MONOCYTES: 7.5 % (ref 0–12)
NEUTROPHILS ABSOLUTE: 2.5 THOU/MM3 (ref 1.8–7.7)
PDW BLD-RTO: 12.9 % (ref 11.5–14.9)
PLATELET # BLD AUTO: 256 THOU/MM3 (ref 130–400)
PMV BLD AUTO: 10.1 FL (ref 9.4–12.4)
POTASSIUM SERPL-SCNC: 4.3 MEQ/L (ref 3.5–5.1)
PROT SERPL-MCNC: 7.6 GM/DL (ref 6.4–8.2)
RBC # BLD: 4.72 MILL/MM3 (ref 4.1–5.3)
SEG NEUTROPHILS: 50 % (ref 43–75)
SODIUM SERPL-SCNC: 139 MEQ/L (ref 136–145)
TRIGL SERPL-MCNC: 160 MG/DL (ref 0–199)
TROPONIN, HIGH SENSITIVITY: 4.6 PG/ML (ref 0–51.3)
WBC # BLD: 4.9 THOU/MM3 (ref 4.8–10.8)

## 2024-08-07 PROCEDURE — 6370000000 HC RX 637 (ALT 250 FOR IP)

## 2024-08-07 PROCEDURE — 70450 CT HEAD/BRAIN W/O DYE: CPT

## 2024-08-07 PROCEDURE — 93005 ELECTROCARDIOGRAM TRACING: CPT | Performed by: STUDENT IN AN ORGANIZED HEALTH CARE EDUCATION/TRAINING PROGRAM

## 2024-08-07 PROCEDURE — 83036 HEMOGLOBIN GLYCOSYLATED A1C: CPT

## 2024-08-07 PROCEDURE — 6360000004 HC RX CONTRAST MEDICATION: Performed by: STUDENT IN AN ORGANIZED HEALTH CARE EDUCATION/TRAINING PROGRAM

## 2024-08-07 PROCEDURE — 82948 REAGENT STRIP/BLOOD GLUCOSE: CPT

## 2024-08-07 PROCEDURE — 99222 1ST HOSP IP/OBS MODERATE 55: CPT

## 2024-08-07 PROCEDURE — 2580000003 HC RX 258

## 2024-08-07 PROCEDURE — 85610 PROTHROMBIN TIME: CPT

## 2024-08-07 PROCEDURE — 70498 CT ANGIOGRAPHY NECK: CPT

## 2024-08-07 PROCEDURE — G0378 HOSPITAL OBSERVATION PER HR: HCPCS

## 2024-08-07 PROCEDURE — 70551 MRI BRAIN STEM W/O DYE: CPT

## 2024-08-07 PROCEDURE — 96372 THER/PROPH/DIAG INJ SC/IM: CPT

## 2024-08-07 PROCEDURE — 80061 LIPID PANEL: CPT

## 2024-08-07 PROCEDURE — 85025 COMPLETE CBC W/AUTO DIFF WBC: CPT

## 2024-08-07 PROCEDURE — 36415 COLL VENOUS BLD VENIPUNCTURE: CPT

## 2024-08-07 PROCEDURE — 99285 EMERGENCY DEPT VISIT HI MDM: CPT

## 2024-08-07 PROCEDURE — 80053 COMPREHEN METABOLIC PANEL: CPT

## 2024-08-07 PROCEDURE — 6360000002 HC RX W HCPCS

## 2024-08-07 PROCEDURE — 70496 CT ANGIOGRAPHY HEAD: CPT

## 2024-08-07 PROCEDURE — 84484 ASSAY OF TROPONIN QUANT: CPT

## 2024-08-07 PROCEDURE — 93010 ELECTROCARDIOGRAM REPORT: CPT | Performed by: INTERNAL MEDICINE

## 2024-08-07 RX ORDER — AMITRIPTYLINE HYDROCHLORIDE 50 MG/1
50 TABLET, FILM COATED ORAL NIGHTLY
Status: DISCONTINUED | OUTPATIENT
Start: 2024-08-07 | End: 2024-08-08 | Stop reason: HOSPADM

## 2024-08-07 RX ORDER — SODIUM CHLORIDE 0.9 % (FLUSH) 0.9 %
5-40 SYRINGE (ML) INJECTION EVERY 12 HOURS SCHEDULED
Status: DISCONTINUED | OUTPATIENT
Start: 2024-08-07 | End: 2024-08-08 | Stop reason: HOSPADM

## 2024-08-07 RX ORDER — ESCITALOPRAM OXALATE 20 MG/1
20 TABLET ORAL DAILY
Status: DISCONTINUED | OUTPATIENT
Start: 2024-08-08 | End: 2024-08-08 | Stop reason: HOSPADM

## 2024-08-07 RX ORDER — ENOXAPARIN SODIUM 100 MG/ML
30 INJECTION SUBCUTANEOUS 2 TIMES DAILY
Status: DISCONTINUED | OUTPATIENT
Start: 2024-08-07 | End: 2024-08-08 | Stop reason: HOSPADM

## 2024-08-07 RX ORDER — INSULIN LISPRO 100 [IU]/ML
0-4 INJECTION, SOLUTION INTRAVENOUS; SUBCUTANEOUS NIGHTLY
Status: DISCONTINUED | OUTPATIENT
Start: 2024-08-07 | End: 2024-08-08 | Stop reason: HOSPADM

## 2024-08-07 RX ORDER — GLUCAGON 1 MG/ML
1 KIT INJECTION PRN
Status: DISCONTINUED | OUTPATIENT
Start: 2024-08-07 | End: 2024-08-08 | Stop reason: HOSPADM

## 2024-08-07 RX ORDER — ONDANSETRON 4 MG/1
4 TABLET, ORALLY DISINTEGRATING ORAL EVERY 8 HOURS PRN
Status: DISCONTINUED | OUTPATIENT
Start: 2024-08-07 | End: 2024-08-08 | Stop reason: HOSPADM

## 2024-08-07 RX ORDER — LANOLIN ALCOHOL/MO/W.PET/CERES
3 CREAM (GRAM) TOPICAL NIGHTLY PRN
Status: DISCONTINUED | OUTPATIENT
Start: 2024-08-07 | End: 2024-08-08 | Stop reason: HOSPADM

## 2024-08-07 RX ORDER — ONDANSETRON 2 MG/ML
4 INJECTION INTRAMUSCULAR; INTRAVENOUS EVERY 6 HOURS PRN
Status: DISCONTINUED | OUTPATIENT
Start: 2024-08-07 | End: 2024-08-08 | Stop reason: HOSPADM

## 2024-08-07 RX ORDER — LEVOTHYROXINE SODIUM 0.07 MG/1
75 TABLET ORAL DAILY
Status: DISCONTINUED | OUTPATIENT
Start: 2024-08-08 | End: 2024-08-08 | Stop reason: HOSPADM

## 2024-08-07 RX ORDER — DEXTROSE MONOHYDRATE 100 MG/ML
INJECTION, SOLUTION INTRAVENOUS CONTINUOUS PRN
Status: DISCONTINUED | OUTPATIENT
Start: 2024-08-07 | End: 2024-08-08 | Stop reason: HOSPADM

## 2024-08-07 RX ORDER — SODIUM CHLORIDE 9 MG/ML
INJECTION, SOLUTION INTRAVENOUS PRN
Status: DISCONTINUED | OUTPATIENT
Start: 2024-08-07 | End: 2024-08-08 | Stop reason: HOSPADM

## 2024-08-07 RX ORDER — INSULIN LISPRO 100 [IU]/ML
0-4 INJECTION, SOLUTION INTRAVENOUS; SUBCUTANEOUS
Status: DISCONTINUED | OUTPATIENT
Start: 2024-08-08 | End: 2024-08-08 | Stop reason: HOSPADM

## 2024-08-07 RX ORDER — POLYETHYLENE GLYCOL 3350 17 G/17G
17 POWDER, FOR SOLUTION ORAL DAILY PRN
Status: DISCONTINUED | OUTPATIENT
Start: 2024-08-07 | End: 2024-08-08 | Stop reason: HOSPADM

## 2024-08-07 RX ORDER — SODIUM CHLORIDE 0.9 % (FLUSH) 0.9 %
5-40 SYRINGE (ML) INJECTION PRN
Status: DISCONTINUED | OUTPATIENT
Start: 2024-08-07 | End: 2024-08-08 | Stop reason: HOSPADM

## 2024-08-07 RX ADMIN — SODIUM CHLORIDE, PRESERVATIVE FREE 10 ML: 5 INJECTION INTRAVENOUS at 20:52

## 2024-08-07 RX ADMIN — IOPAMIDOL 100 ML: 755 INJECTION, SOLUTION INTRAVENOUS at 16:22

## 2024-08-07 RX ADMIN — ONDANSETRON 4 MG: 4 TABLET, ORALLY DISINTEGRATING ORAL at 20:57

## 2024-08-07 RX ADMIN — ENOXAPARIN SODIUM 30 MG: 100 INJECTION SUBCUTANEOUS at 20:52

## 2024-08-07 RX ADMIN — AMITRIPTYLINE HYDROCHLORIDE 50 MG: 50 TABLET, FILM COATED ORAL at 23:11

## 2024-08-07 RX ADMIN — Medication 3 MG: at 23:45

## 2024-08-07 ASSESSMENT — PAIN SCALES - GENERAL: PAINLEVEL_OUTOF10: 0

## 2024-08-07 ASSESSMENT — ENCOUNTER SYMPTOMS
BACK PAIN: 0
SORE THROAT: 0
PHOTOPHOBIA: 0
ABDOMINAL PAIN: 0
SHORTNESS OF BREATH: 0
CHEST TIGHTNESS: 0
NAUSEA: 0

## 2024-08-07 NOTE — ED NOTES
Pt. To be direct admit to [facility], prefers spouse to drive. Pulse regular. Extremities warm. Respirations regular and quiet.  Mucous membranes pink & moist. Alert and oriented times 3.  No nausea or vomiting. Range of motion within patient's limits. Skin pink, warm and dry.  Calm and cooperative. Pt. Ambulatory to car with steady gait accompanied per spouse and this RN.

## 2024-08-07 NOTE — ED NOTES
Dr Workman walked out of room 3 from assessing patient.  States patient is a Code Stroke.  Patient is alert, oriented x4.  Does have right sided facial droop, slurred speech and tongue deviates right.  When touched both sides of face, bilateral arms and bilateral legs patient has decreased sensation to the left side.  Dr Workman updated.  Patient assisted to wheelchair for CT scan at this time.

## 2024-08-07 NOTE — ED TRIAGE NOTES
How Severe Is It?: mild Pt. Presents ambulatory to ED with c/o left jaw \"popping\",  pt. Voices has been happening for past 2 weeks, now everytime she talks it is popping.  Pt. Informed of delay due to ED census, pt. Voices understanding.    Is This A New Presentation, Or A Follow-Up?: Cyst Additional History: Mom states that Millia  has gotten bigger. It is has become bothersome to patient. Patient is here today with mom.

## 2024-08-07 NOTE — H&P
INFORMATION: Slurred speech, tongue deviated to the right, last known well 1900 on August 6.  Also with left-sided jaw pain, popping sensation with moving jaw. COMPARISON: CT scan of the brain obtained on the same day.. TECHNIQUE: 1 mm CT axial images were obtained through the head and neck after the fast bolus administration of contrast. A noncontrast localizer was obtained. 3-D reconstructions were performed on a dedicated 3-D workstation. These include multiplanar MPR images and multiplanar MIP images. Isovue intravenous contrast was given. All CT scans at this facility use dose modulation, iterative reconstruction, and/or weight-based dosing when appropriate to reduce radiation dose to as low as reasonably achievable. All carotid artery measurements are performed utilizing NASCET criteria. FINDINGS: Right common and internal carotid arteries: There is no significant hemodynamic stenosis in the right common and internal carotid arteries. Left common and internal carotid arteries. There is no significant hemodynamic stenosis in the left common and internal carotid arteries. Vertebral arteries: There is a dominant left and hypoplastic right vertebral artery with antegrade flow bilaterally. Aortic arch and origin of the great vessels: There is atherosclerotic calcification at the origin of the left subclavian artery. There is normal origin of the left common carotid and brachiocephalic arteries. Internal carotid arteries: There is antegrade flow in the internal carotid arteries bilaterally.. Middle cerebral arteries: There is antegrade flow in the middle cerebral arteries bilaterally.. Anterior cerebral arteries: There is antegrade flow in the anterior cerebral arteries bilaterally. Vertebral arteries: There is a dominant left vertebral artery which supplies the posterior circulation. There is antegrade flow in the left vertebral artery. Basilar artery: There is antegrade flow in the basilar artery. Superior

## 2024-08-07 NOTE — ED PROVIDER NOTES
[facility]  EMERGENCY DEPARTMENT ENCOUNTER      Patient Name: Becca Keating  MRN: [id number]  YOB: 1965  Date of Evaluation: 2024  Attending Physician: Benji Workman MD    CHIEF COMPLAINT       Chief Complaint   Patient presents with    Temporomandibular Joint Pain    Headache       HISTORY OF PRESENT ILLNESS    HPI    History obtained from chart review and the patient.    Becca is a 58 y.o. old female who presents to the emergency department by Walk In for evaluation of left-sided TMJ pain.  She reports this has been ongoing for the past couple weeks no known trauma, no falls 1 prior episode many years ago.  Also was having slurred speech, decrease in sensation in her left upper and lower extremity.  Last known well was 1900 yesterday evening    Chart reviewed, relevant history summarized in HPI above.      REVIEW OF SYSTEMS   Review of Systems  Negative unless documented in HPI    PAST MEDICAL AND SURGICAL HISTORY   Becca has a past medical history of Arthritis, Headache(784.0), and Thyroid disease.    Becca has a past surgical history that includes  section; Endometrial ablation; Carpal tunnel release (Right); Ankle Fusion; Tooth Extraction (2020); Pain management procedure (Right, 2023); and back surgery (05/10/2023).    CURRENT MEDICATIONS   Beccahas a current medication list which includes the following long-term medication(s): amitriptyline, metformin, levothyroxine, ibuprofen, and escitalopram.    ALLERGIES   Beccais allergic to lyrica [pregabalin], augmentin [amoxicillin-pot clavulanate], chantix [varenicline], and penicillins.    FAMILY HISTORY   Mercedesylfamily history is not on file.    SOCIAL HISTORY   Becca reports that she has been smoking cigarettes. She started smoking about 19 years ago. She has a 9.8 pack-year smoking history. She has never used smokeless tobacco. She reports current alcohol use. She reports that       Comments: Tongue deviates to the right, right sided facial droop, slurred            FORMAL DIAGNOSTIC RESULTS   RADIOLOGY: Interpretation per the Radiologist below, if available at the time of this note (none if blank):  CTA HEAD W WO CONTRAST   Final Result      1. Negative CTA of the head and neck.      **This report has been created using voice recognition software. It may contain   minor errors which are inherent in voice recognition technology.**      Electronically signed by Dr. Zara Inman      CTA NECK W WO CONTRAST   Final Result      1. Negative CTA of the head and neck.      **This report has been created using voice recognition software. It may contain   minor errors which are inherent in voice recognition technology.**      Electronically signed by Dr. Zara Inman      CT HEAD WO CONTRAST   Final Result      1. Stable CT scan of the brain, no interval change since previous study dated   4/9/2020.   2. Results called to [facility] at 4:15 p.m.               **This report has been created using voice recognition software. It may contain   minor errors which are inherent in voice recognition technology.**      Electronically signed by Dr. Zara Inman      MRI BRAIN WO CONTRAST    (Results Pending)       LABS: (none if blank)  Labs Reviewed   CBC WITH AUTO DIFFERENTIAL   COMPREHENSIVE METABOLIC PANEL   PROTIME-INR   TROPONIN   GLOMERULAR FILTRATION RATE, ESTIMATED   ANION GAP   CBC   HEMOGLOBIN A1C   LIPID PANEL   POCT GLUCOSE     (Any cultures that may have been sent were not resulted at the time of this patient visit. A negative COVID-19 test should be interpreted as COVID no longer suspected unless otherwise noted in this encounter documentation note)    MEDICAL DECISION MAKING   Initial Differential: Includes but is not limited to stroke, TMJ dysfunction, hypoglycemia, peripheral nerve constriction?    Discrepancies:  None    Summary: This is a 58 y.o. old female here for evaluation of left-sided TMJ

## 2024-08-08 VITALS
RESPIRATION RATE: 18 BRPM | HEIGHT: 63 IN | SYSTOLIC BLOOD PRESSURE: 108 MMHG | TEMPERATURE: 98.7 F | HEART RATE: 77 BPM | BODY MASS INDEX: 40.2 KG/M2 | OXYGEN SATURATION: 98 % | WEIGHT: 226.85 LBS | DIASTOLIC BLOOD PRESSURE: 60 MMHG

## 2024-08-08 PROBLEM — F32.A DEPRESSION: Status: ACTIVE | Noted: 2024-08-08

## 2024-08-08 PROBLEM — M26.622 ARTHRALGIA OF LEFT TEMPOROMANDIBULAR JOINT: Status: ACTIVE | Noted: 2024-08-08

## 2024-08-08 PROBLEM — E11.9 NON-INSULIN DEPENDENT TYPE 2 DIABETES MELLITUS (HCC): Status: ACTIVE | Noted: 2024-08-08

## 2024-08-08 LAB
DEPRECATED RDW RBC AUTO: 45.2 FL (ref 35–45)
ERYTHROCYTE [DISTWIDTH] IN BLOOD BY AUTOMATED COUNT: 13.1 % (ref 11.5–14.5)
GLUCOSE BLD STRIP.AUTO-MCNC: 117 MG/DL (ref 70–108)
GLUCOSE BLD STRIP.AUTO-MCNC: 95 MG/DL (ref 70–108)
HCT VFR BLD AUTO: 41.2 % (ref 37–47)
HGB BLD-MCNC: 13.8 GM/DL (ref 12–16)
MCH RBC QN AUTO: 31.5 PG (ref 26–33)
MCHC RBC AUTO-ENTMCNC: 33.5 GM/DL (ref 32.2–35.5)
MCV RBC AUTO: 94.1 FL (ref 81–99)
PLATELET # BLD AUTO: 249 THOU/MM3 (ref 130–400)
PMV BLD AUTO: 10.8 FL (ref 9.4–12.4)
RBC # BLD AUTO: 4.38 MILL/MM3 (ref 4.2–5.4)
WBC # BLD AUTO: 5.6 THOU/MM3 (ref 4.8–10.8)

## 2024-08-08 PROCEDURE — 6370000000 HC RX 637 (ALT 250 FOR IP)

## 2024-08-08 PROCEDURE — 2580000003 HC RX 258

## 2024-08-08 PROCEDURE — 85027 COMPLETE CBC AUTOMATED: CPT

## 2024-08-08 PROCEDURE — 97161 PT EVAL LOW COMPLEX 20 MIN: CPT

## 2024-08-08 PROCEDURE — 36415 COLL VENOUS BLD VENIPUNCTURE: CPT

## 2024-08-08 PROCEDURE — 82948 REAGENT STRIP/BLOOD GLUCOSE: CPT

## 2024-08-08 PROCEDURE — 99239 HOSP IP/OBS DSCHRG MGMT >30: CPT | Performed by: NURSE PRACTITIONER

## 2024-08-08 PROCEDURE — 99223 1ST HOSP IP/OBS HIGH 75: CPT

## 2024-08-08 PROCEDURE — 96372 THER/PROPH/DIAG INJ SC/IM: CPT

## 2024-08-08 PROCEDURE — 92523 SPEECH SOUND LANG COMPREHEN: CPT

## 2024-08-08 PROCEDURE — 97166 OT EVAL MOD COMPLEX 45 MIN: CPT

## 2024-08-08 PROCEDURE — 6360000002 HC RX W HCPCS

## 2024-08-08 PROCEDURE — G0378 HOSPITAL OBSERVATION PER HR: HCPCS

## 2024-08-08 PROCEDURE — 97116 GAIT TRAINING THERAPY: CPT

## 2024-08-08 PROCEDURE — 97530 THERAPEUTIC ACTIVITIES: CPT

## 2024-08-08 PROCEDURE — 92610 EVALUATE SWALLOWING FUNCTION: CPT

## 2024-08-08 PROCEDURE — 6370000000 HC RX 637 (ALT 250 FOR IP): Performed by: NURSE PRACTITIONER

## 2024-08-08 RX ORDER — ACETAMINOPHEN 325 MG/1
650 TABLET ORAL EVERY 4 HOURS PRN
Status: DISCONTINUED | OUTPATIENT
Start: 2024-08-08 | End: 2024-08-08 | Stop reason: HOSPADM

## 2024-08-08 RX ADMIN — ACETAMINOPHEN 650 MG: 325 TABLET ORAL at 13:38

## 2024-08-08 RX ADMIN — SODIUM CHLORIDE, PRESERVATIVE FREE 10 ML: 5 INJECTION INTRAVENOUS at 09:55

## 2024-08-08 RX ADMIN — ESCITALOPRAM OXALATE 20 MG: 20 TABLET ORAL at 09:56

## 2024-08-08 RX ADMIN — ENOXAPARIN SODIUM 30 MG: 100 INJECTION SUBCUTANEOUS at 09:54

## 2024-08-08 RX ADMIN — LEVOTHYROXINE SODIUM 75 MCG: 0.07 TABLET ORAL at 03:16

## 2024-08-08 ASSESSMENT — ENCOUNTER SYMPTOMS
NAUSEA: 0
WHEEZING: 0
ABDOMINAL PAIN: 0
VOMITING: 0
DIARRHEA: 0
SORE THROAT: 1
TROUBLE SWALLOWING: 0
SHORTNESS OF BREATH: 0

## 2024-08-08 ASSESSMENT — VISUAL ACUITY: VA_NORMAL: 1

## 2024-08-08 NOTE — PROGRESS NOTES
Patient/family has been educated on their personal risk factors of:            They have been given hand outs on the following medications:(  give handouts/attach to AVS)   asa  Plavix  coumadin  statins(Specify)  antihypertensive(specify)    Treatment for stroke includes:  Risk factor modifications  Following the medication regime prescribed by physician      Educated on BE FAST-Face-Arm-Speech-Time    A stroke is a brain attack.Stroke is a brain injury. It occurs when the brain's blood supply is interrupted. Blood carries oxygen and nutrients to the brain. Without oxygen and nutrients from blood, brain tissue starts to die rapidly. This can happen in less than 10 minutes.    A stroke occurs when blood flow to the brain is blocked (called ischemic stroke). This is caused by one of the following:   Sudden decreased blood flow   Damage to a blood vessel supplying blood to the brain can occur suddenly from either:   Injury   A clot that forms and breaks off from another part of the body (such as the heart or neck)   There are certain conditions which predispose people to form blood clots, such as:   Cancer   Pregnancy   Atrial fibrillation   Certain autoimmune diseases   Local blood clot   A build-up of fatty substances ( atherosclerotic plaque ) along the inner lining of the artery causes:   Narrowing of artery   Reduced elasticity   Local inflammation   Blood protein defects leading to increased clotting tendency   Decreased blood flow in the artery   Clot in an artery supplying the brain   Inflammatory conditions in the blood vessels (vasculitis)   A stroke may also occur if a blood vessel breaks and bleeds into or around the brain. This is called hemorrhagic stroke.      This condition needs to be monitored closely. Be sure to keep all appointments. Have exams and blood tests done as directed.     Call 911 If Any of the Following Occurs   It is important that you and those around you know the warning signs for

## 2024-08-08 NOTE — PROGRESS NOTES
[facility]  SPEECH THERAPY  [facility] NEUROSCIENCES 4A  Speech - Language - Cognitive Evaluation + Clinical Swallow Evaluation    Discharge Recommendations: Outpatient Speech Therapy  DIET ORDER RECOMMENDATIONS AFTER EVALUATION: Regular diet and thin liquids     SLP Individual Minutes  Time In: 925  Time Out: 946  Minutes: 21  Timed Code Treatment Minutes: 0 Minutes     Speech, Language, Cognitive Evaluation: 13 minutes   Clinical Swallow Evaluation: 8 minutes     Date: 2024  Patient Name: Becca Keating      CSN: [id number]   : 1965  (58 y.o.)  Gender: female   Referring Physician:      Keren Gonzalez APRN - CNP     Diagnosis: Stroke like symptoms   Precautions: Fall risk   History of Present Illness/Injury: Patient admit to [facility] with above medical dx. Please refer to physician H&P for full patient medical history. Patient admit with R facial droop, dysarthria and lingual deviation to R. Resolution of symptoms. MRI negative.     Past Medical History:   Diagnosis Date    Arthritis     Headache(784.0)     Thyroid disease        Pain: No pain reported.    Subjective:  Session approved by Shirin CRAWOFRD. Patient seen upright in recliner. Alert and cooperative.   *patient appeared to be slightly anxious with concerns for slight agitation as session progressed. Neuro NP in to discuss patient history leading up to admission. Details not matching with ER documentation. Concerns for high level cognitive impairments. MRI negative.      SOCIAL HISTORY:   Living Arrangements: Lives at home in [place] with    Work History:  Currently on short term disability with hopes to return back to work s/p spinal fusion. Employed at [employer]; 40 hours/week   Education Level: HS degree   Driving Status: Active   Finance Management: Independent  Medication Management: Independent  ADL's: Independent.   Hobbies: swimming, walking   Vision Status: glasses   Hearing: WFL    Type of Home: House  Home Layout: Two level, Able to Live on Main level with bedroom/bathroom  Home Access: Stairs to enter with rails  Entrance Stairs - Number of Steps: 3  Entrance Stairs - Rails: Right  Home Equipment: None    SPEECH / VOICE:  Speech and Voice appear to be grossly intact for basic and complex daily communication    LANGUAGE:  Receptive:  Receptive language skills appear to be grossly intact for basic and complex daily communication.    Expressive:  Confrontational Naming: 3/3 MOCA  Divergent Naming (abstract): x11+ (WFL)  Sentence Formulation: WFL  Conversational Speech: decreased organization of higher level thoughts   Paraphasias: noted x1 during assessment  Repetition: sentence level 2/2    COGNITION:  Sno Cognitive Assessment (MOCA) version 7.1 completed.  Patient scored 22/30.  Normal is greater than or equal to 26/30.  Inclusion of +1 point given highest level of education achieved less than/equal to 12th grade or GED with limited-0 post-secondary schooling     Orientation: /6  Immediate Recall: 1/5 indep, improved to 4/5 with repetition  Short-Term Recall: 2/5 indep, 1/5 min cues, 2/5 FO2  Divergent Naming: x11+ (WFL)  Reasonin/2  Thought Organization: high level deficits  Attention: 2/3  Math Computation: 0/  Executive Functioning: 3/5    SWALLOWING:    Respiratory Status: Room Air      Behavioral Observation: Alert and cooperative    CRANIAL NERVE ASSESSMENT   CN V (Trigeminal) Closes and Opens Mandible WFL    Rotary Jaw Movement WFL      CN VII (Facial) Cheeks Hold Food out of Sulci WFL    Opens, Closes/Seals, Protrudes, Retracts Lips WFL    General Appearance Impaired and very slight R facial droop    Sensation Not Tested      CN X (Vagus - Pharyngeal) Raises Back of Tongue Not Tested      CN XI (Accessory) Lifts Soft Palate WFL      CN XII (Hypoglossal) Elevates Tongue Up and Back WFL    Protrusion   WFL    Lateralizes Tongue WFL    Sensation Not Tested      Other

## 2024-08-08 NOTE — CARE COORDINATION
Case Management Assessment Initial Evaluation    Date/Time of Evaluation: 2024 9:22 AM  Assessment Completed by: Orquidea Thorpe RN    If patient is discharged prior to next notation, then this note serves as note for discharge by case management.    Patient Name: Becca Keating                   YOB: 1965  Diagnosis: Stroke-like symptom [R29.90]                   Date / Time: 2024  2:57 PM  [facility]     Patient Admission Status: Observation   Readmission Risk Low 0-14, Mod 15-19), High > 20: No data recorded  Current PCP: Aretha Boykin, FAISAL - CNP  Health Care Decision Makers:  verified as next of kin, decision maker if needed.     Additional Case Management Notes: Direct admit to  from [facility] ambulatory care for stroke like symptoms. Hospitalist following. Neuro consult. Swallow screen. PT/OT.     Procedures: none    Imagin/7 CT head: 1. Stable CT scan of the brain, no interval change since previous study dated    CTA head and neck: no acute findings   MRI brain: no acute findings    Patient Goals/Plan/Treatment Preferences: Spoke with Becca, she plans to return home with . She is independent and denied discharge needs. Has not been using dme while in hospital. Await therapy evals.     Update: PT/OT signed off. SLP recommends OP. SW received message that patient prefers to go to [facility] as she has used them in past. Will send orders when completed to , if discharged this evening, nurse will send orders with patient so she can call to scheduled.        24 0920   Service Assessment   Patient Orientation Alert and Oriented   Cognition Alert   History Provided By Patient   Primary Caregiver Self   Support Systems Spouse/Significant Other;Children;Family Members   Patient's Healthcare Decision Maker is: Patient Declined (Legal Next of Kin Remains as Decision Maker)  ( is primary decision maker if needed.)   PCP

## 2024-08-08 NOTE — PROGRESS NOTES
Pt was sitting on the chair beside her bed and was alone. She was dealing with stroke-like symptom. She was encouraged and anointed. It was appreciated.    08/08/24 1303   Encounter Summary   Encounter Overview/Reason Initial Encounter   Service Provided For Patient   Referral/Consult From [facility]   Support System Family members   Last Encounter  08/08/24  (Anointed)   Complexity of Encounter Low   Begin Time 1210   End Time  1216   Total Time Calculated 6 min   Spiritual/Emotional needs   Type Spiritual Support   Rituals, Rites and Sacraments   Type Anointing   Assessment/Intervention/Outcome   Assessment Hopeful   Intervention Empowerment   Outcome Encouraged

## 2024-08-08 NOTE — PLAN OF CARE
Problem: Discharge Planning  Goal: Discharge to home or other facility with appropriate resources  Outcome: Progressing  Discharge to home or other facility with appropriate resources:   Identify barriers to discharge with patient and caregiver   Identify discharge learning needs (meds, wound care, etc)   Refer to discharge planning if patient needs post-hospital services based on physician order or complex needs related to functional status, cognitive ability or social support system   Arrange for needed discharge resources and transportation as appropriate     Problem: Pain  Goal: Verbalizes/displays adequate comfort level or baseline comfort level  Outcome: Progressing  Flowsheets (Taken 8/7/2024 2132)  Verbalizes/displays adequate comfort level or baseline comfort level:   Encourage patient to monitor pain and request assistance   Assess pain using appropriate pain scale     Problem: Safety - Adult  Goal: Free from fall injury  Outcome: Progressing  Flowsheets (Taken 8/7/2024 2132)  Free From Fall Injury: Instruct family/caregiver on patient safety     Problem: Neurosensory - Adult  Goal: Achieves stable or improved neurological status  Outcome: Progressing  Flowsheets (Taken 8/7/2024 2132)  Achieves stable or improved neurological status:   Assess for and report changes in neurological status   Initiate measures to prevent increased intracranial pressure     Problem: Neurosensory - Adult  Goal: Absence of seizures  Outcome: Progressing  Flowsheets (Taken 8/7/2024 2132)  Absence of seizures: Monitor for seizure activity.  If seizure occurs, document type and location of movements and any associated apnea   Care plan reviewed with patient.  Patient verbalizes understanding of the plan of care and contributed to goal setting.

## 2024-08-08 NOTE — PROGRESS NOTES
Patient admitted to 4A Room 19 via direct admit  Complaint upon arrival to the room stroke workup  Vital signs obtained. Assessment and data collection initiated. Oriented to room. Policies and procedures for 4a explained All questions answered with no further questions at this time. Fall prevention and safety brochure discussed with patient. 2 person skin check completed.

## 2024-08-08 NOTE — DISCHARGE SUMMARY
Hospital Medicine Discharge Summary      Patient Identification:   Becca Keating   : 1965  MRN: [id number]   Account: [id number]      Patient's PCP: Aretha Boykin APRN - CNP    Admit Date: 2024     Discharge Date:   24    Admitting Physician: No admitting provider for patient encounter.     Discharge Physician: FAISAL Cannon CNP     Discharge Diagnoses:      Left TMJ pain  Continue pain meds as needed  Follow up with ENT as instructed   Patient presented for possible stroke like symptoms had  CT head  that was negative for acute findings. CTA head and neck no acute findings. Brain MRI was also negative  Patient was evaluated by neurology with recommendations to follow up outpatient, patient to follow up with ENT.    2. NIDDM type 2   Controlled.    Continue Metformin  Hgb A1c 5.2.     3.Hypothyroidism:   stable  Continue Levothyroxine       4.Anxiety/Depression:   stable  Continue  Escitalopram    5.Obesity BMI 40.19  Encourage health food choices             Active Hospital Problems    Diagnosis Date Noted    Stroke-like symptom [R29.90] 2024       The patient was seen and examined on day of discharge and this discharge summary is in conjunction with any daily progress note from day of discharge.    Exam:     Vitals:  Vitals:    24 2300 24 0305 24 0731 24 1138   BP: 108/61 105/60 112/65 (!) 101/58   Pulse: 69 79 75 73   Resp: 16 16 16 16   Temp: 97.9 °F (36.6 °C) 97.9 °F (36.6 °C) 98.1 °F (36.7 °C) 98.2 °F (36.8 °C)   TempSrc: Oral Oral Oral Oral   SpO2: 100% 95% 96% 97%   Weight:       Height:         Weight: Weight - Scale: 102.9 kg (226 lb 13.7 oz)     24 hour intake/output:  Intake/Output Summary (Last 24 hours) at 2024 1637  Last data filed at 2024 1241  Gross per 24 hour   Intake 880 ml   Output --   Net 880 ml         General appearance:  Awake alert No apparent distress, appears stated age and cooperative.  HEENT:  Normal

## 2024-08-08 NOTE — DISCHARGE INSTRUCTIONS
Patient to follow-up outpatient for further evaluation of her TMJ pain per hospitalist's recommendation (dentist/oral surgeon/ENT etc). Dr. Inman recommended thyroid US to be completed for further evaluation.

## 2024-08-08 NOTE — PROGRESS NOTES
[facility]  INPATIENT OCCUPATIONAL THERAPY  STRZ NEUROSCIENCES 4A  EVALUATION    Time:   Time In: 1054  Time Out: 1113  Timed Code Treatment Minutes: 9 Minutes  Minutes: 19          Date: 2024  Patient Name: Becca Keating,   Gender: female      MRN: [id number]  : 1965  (58 y.o.)  Referring Practitioner: Keren Gonzalez APRN - CNP  Diagnosis: stroke like symptoms  Additional Pertinent Hx: Per H&P: \"Becca Keating is a 58 y.o. female with PMHx of hypothyroidism who presented to [facility] with chief complaint of left sided jaw \"popping\". Symptoms have been ongoing for about 2 weeks.  She had no recent trauma.  She says she is constantly biting the inside of her cheek and tongue, causing her to forcibly move her tongue to the right side and affecting her speech.  She wasn't sure if she should see her PCP or a dentist, but she said that she recently met her deductible so she decided to go to the emergency department.    On arrival to the emergency department, the patient was assessed and thought to have dysarthria, right sided facial droop, and tongue deviation.  A code stroke was activated.  Imaging obtained was negative.  On-call neurointerventionalist Dr. Gaviria was notified and recommended MRI.  Her symptoms appeared to have resolved prior to transfer to [facility].   On my assessment, the patient does have some slurred speech.  She tells me she has been talking like this since the jaw problems began.  She has to forcibly move her tongue out of the way as she has been biting it along with the inside of her cheek.  She denies any numbness, tingling, or weakness of the extremities.\"    Restrictions/Precautions:  Restrictions/Precautions: General Precautions  Position Activity Restriction  Other position/activity restrictions: Hx of back pain and surgery. Recommend no BLT    Subjective  Chart Reviewed: Yes, Orders, Progress Notes, History and Physical  Patient assessed for rehabilitation

## 2024-08-08 NOTE — PROGRESS NOTES
[facility]  INPATIENT PHYSICAL THERAPY  EVALUATION  [facility] 4A - 4A-19/019-A    Discharge Recommendations: Discharge Recommendations: Outpatient PT (Recommend OP PT for TMJ)  Equipment Recommendations: Equipment Needed: No    Time In: 830  Time Out: 901  Timed Code Treatment Minutes: 23 Minutes  Minutes: 31          Date: 2024  Patient Name: Becca Keating,  Gender:  female        MRN: [id number]  : 1965  (58 y.o.)      Referring Practitioner: Keren Gonzalez APRN - CNP  Diagnosis: Stroke-like symptom  Additional Pertinent Hx: Per H&P: \"Becca Keating is a 58 y.o. female with PMHx of hypothyroidism who presented to [facility] with chief complaint of left sided jaw \"popping\". Symptoms have been ongoing for about 2 weeks.  She had no recent trauma.  She says she is constantly biting the inside of her cheek and tongue, causing her to forcibly move her tongue to the right side and affecting her speech.  She wasn't sure if she should see her PCP or a dentist, but she said that she recently met her deductible so she decided to go to the emergency department.    On arrival to the emergency department, the patient was assessed and thought to have dysarthria, right sided facial droop, and tongue deviation.  A code stroke was activated.  Imaging obtained was negative.  On-call neurointerventionalist Dr. Gaviria was notified and recommended MRI.  Her symptoms appeared to have resolved prior to transfer to [facility].   On my assessment, the patient does have some slurred speech.  She tells me she has been talking like this since the jaw problems began.  She has to forcibly move her tongue out of the way as she has been biting it along with the inside of her cheek.  She denies any numbness, tingling, or weakness of the extremities.\"     Restrictions/Precautions:  Restrictions/Precautions: General Precautions  Position Activity Restriction  Other position/activity restrictions: Hx of back

## 2024-08-08 NOTE — PROGRESS NOTES
Discharge teaching and instructions for diagnosis/procedure of observation completed with patient using teachback method. AVS reviewed. Printed prescriptions given to patient. Patient voiced understanding regarding prescriptions, follow up appointments, and care of self at home. Discharged in a wheelchair to  home with support per family       Pt discharging in stable condition.

## 2024-08-14 ENCOUNTER — TELEPHONE (OUTPATIENT)
Dept: PHYSICAL MEDICINE AND REHAB | Age: 59
End: 2024-08-14

## 2024-09-16 ENCOUNTER — HOSPITAL ENCOUNTER (OUTPATIENT)
Age: 59
Discharge: HOME | End: 2024-09-16
Payer: COMMERCIAL

## 2024-09-16 LAB
DEPRECATED MEAN GLUCOSE BLD GHB EST-ACNC: 108 MG/DL (ref 70–126)
GLUCOSE FASTING: 95 MG/DL (ref 70–108)
HBA1C MFR BLD HPLC: 5.6 % (ref 4.4–6.4)
INSULIN SERPL-ACNC: 22.6 MU/L

## 2024-09-16 PROCEDURE — 83525 ASSAY OF INSULIN: CPT

## 2024-09-16 PROCEDURE — 36415 COLL VENOUS BLD VENIPUNCTURE: CPT

## 2024-09-16 PROCEDURE — 82172 ASSAY OF APOLIPOPROTEIN: CPT

## 2024-09-16 PROCEDURE — 83036 HEMOGLOBIN GLYCOSYLATED A1C: CPT

## 2024-09-16 PROCEDURE — 82947 ASSAY GLUCOSE BLOOD QUANT: CPT

## 2024-09-17 LAB — APO B100 SERPL-MCNC: NORMAL MG/DL

## 2024-09-30 ENCOUNTER — HOSPITAL ENCOUNTER (OUTPATIENT)
Dept: ULTRASOUND IMAGING | Age: 59
Discharge: HOME | End: 2024-09-30
Payer: COMMERCIAL

## 2024-09-30 DIAGNOSIS — E04.9 ENLARGED THYROID: ICD-10-CM

## 2024-09-30 PROCEDURE — 76536 US EXAM OF HEAD AND NECK: CPT

## 2024-12-20 ENCOUNTER — HOSPITAL ENCOUNTER (EMERGENCY)
Age: 59
Discharge: HOME | End: 2024-12-20
Attending: EMERGENCY MEDICINE
Payer: COMMERCIAL

## 2024-12-20 ENCOUNTER — APPOINTMENT (OUTPATIENT)
Dept: CT IMAGING | Age: 59
End: 2024-12-20
Payer: COMMERCIAL

## 2024-12-20 VITALS
WEIGHT: 230 LBS | TEMPERATURE: 97.3 F | SYSTOLIC BLOOD PRESSURE: 137 MMHG | DIASTOLIC BLOOD PRESSURE: 79 MMHG | HEIGHT: 63 IN | HEART RATE: 99 BPM | OXYGEN SATURATION: 98 % | RESPIRATION RATE: 16 BRPM | BODY MASS INDEX: 40.75 KG/M2

## 2024-12-20 DIAGNOSIS — M54.50 CHRONIC RIGHT-SIDED LOW BACK PAIN WITHOUT SCIATICA: ICD-10-CM

## 2024-12-20 DIAGNOSIS — G89.29 CHRONIC RIGHT-SIDED LOW BACK PAIN WITHOUT SCIATICA: ICD-10-CM

## 2024-12-20 DIAGNOSIS — R10.9 ACUTE RIGHT FLANK PAIN: Primary | ICD-10-CM

## 2024-12-20 LAB
ALBUMIN SERPL BCP-MCNC: 4.2 GM/DL (ref 3.4–5)
ALP SERPL-CCNC: 67 U/L (ref 46–116)
ALT SERPL W P-5'-P-CCNC: 23 U/L (ref 14–63)
AMORPH SED URNS QL MICRO: ABNORMAL
ANION GAP SERPL CALC-SCNC: 11 MEQ/L (ref 8–16)
AST SERPL W P-5'-P-CCNC: 5 U/L (ref 15–37)
BACTERIA URNS QL MICRO: ABNORMAL
BASOPHILS # BLD: 0.5 % (ref 0–3)
BASOPHILS ABSOLUTE: 0 THOU/MM3 (ref 0–0.1)
BILIRUB SERPL-MCNC: 0.4 MG/DL (ref 0.2–1)
BILIRUB UR QL STRIP.AUTO: NEGATIVE
BUN SERPL-MCNC: 20 MG/DL (ref 7–18)
CALCIUM SERPL-MCNC: 9.4 MG/DL (ref 8.5–10.1)
CASTS #/AREA URNS LPF: ABNORMAL /LPF
CHARACTER UR: CLEAR
CHLORIDE SERPL-SCNC: 101 MEQ/L (ref 98–107)
CO2 SERPL-SCNC: 28 MEQ/L (ref 21–32)
COLOR UR AUTO: YELLOW
CREAT SERPL-MCNC: 0.8 MG/DL (ref 0.6–1.3)
CRYSTALS VITF MICRO: ABNORMAL
EOSINOPHILS ABSOLUTE: 0.2 THOU/MM3 (ref 0–0.5)
EOSINOPHILS RELATIVE PERCENT: 3 % (ref 0–4)
EPI CELLS #/AREA URNS HPF: ABNORMAL /HPF
GFR SERPL CREATININE-BSD FRML MDRD: 86 ML/MIN/1.73M2
GLUCOSE SERPL-MCNC: 125 MG/DL (ref 74–106)
GLUCOSE UR QL STRIP.AUTO: NEGATIVE MG/DL
HCT VFR BLD CALC: 40.9 % (ref 37–47)
HEMOGLOBIN: 13.8 GM/DL (ref 12–16)
HGB UR STRIP.AUTO-MCNC: NEGATIVE MG/L
IMMATURE GRANS (ABS): 0.06 THOU/MM3 (ref 0–0.07)
IMMATURE GRANULOCYTES %: 1 %
KETONES UR QL STRIP.AUTO: NEGATIVE
LEUKOCYTE ESTERASE UR QL STRIP.AUTO: ABNORMAL
LYMPHOCYTES # BLD AUTO: 30.2 % (ref 15–47)
LYMPHOCYTES ABSOLUTE: 1.8 THOU/MM3 (ref 1–4.8)
MCH RBC QN AUTO: 32 PG (ref 26–32)
MCHC RBC AUTO-ENTMCNC: 33.7 GM/DL (ref 31–35)
MCV RBC AUTO: 94.9 FL (ref 81–99)
MONOCYTES: 0.4 THOU/MM3 (ref 0.3–1.3)
MONOCYTES: 7 % (ref 0–12)
MUCOUS THREADS URNS QL MICRO: ABNORMAL
NEUTROPHILS ABSOLUTE: 3.5 THOU/MM3 (ref 1.8–7.7)
NITRITE UR QL STRIP.AUTO: NEGATIVE
PDW BLD-RTO: 13.1 % (ref 11.5–14.9)
PH UR STRIP.AUTO: 6 [PH] (ref 5–9)
PLATELET # BLD AUTO: 256 THOU/MM3 (ref 130–400)
PMV BLD AUTO: 9.9 FL (ref 9.4–12.4)
POTASSIUM SERPL-SCNC: 3.8 MEQ/L (ref 3.5–5.1)
PROT SERPL-MCNC: 7.2 GM/DL (ref 6.4–8.2)
PROT UR STRIP.AUTO-MCNC: NEGATIVE MG/DL
RBC # BLD: 4.31 MILL/MM3 (ref 4.1–5.3)
RBC #/AREA URNS HPF: ABNORMAL /HPF
REFLEX TO URINE C & S: ABNORMAL
SEG NEUTROPHILS: 58.8 % (ref 43–75)
SODIUM SERPL-SCNC: 140 MEQ/L (ref 136–145)
SP GR UR STRIP.AUTO: 1.02 (ref 1–1.03)
UROBILINOGEN UR STRIP-ACNC: 0.2 EU/DL (ref 0–1)
WBC # BLD: 6 THOU/MM3 (ref 4.8–10.8)
WBC # UR STRIP.AUTO: ABNORMAL /HPF

## 2024-12-20 PROCEDURE — 85025 COMPLETE CBC W/AUTO DIFF WBC: CPT

## 2024-12-20 PROCEDURE — 74176 CT ABD & PELVIS W/O CONTRAST: CPT

## 2024-12-20 PROCEDURE — 6370000000 HC RX 637 (ALT 250 FOR IP): Performed by: EMERGENCY MEDICINE

## 2024-12-20 PROCEDURE — 81001 URINALYSIS AUTO W/SCOPE: CPT

## 2024-12-20 PROCEDURE — 87086 URINE CULTURE/COLONY COUNT: CPT

## 2024-12-20 PROCEDURE — 99284 EMERGENCY DEPT VISIT MOD MDM: CPT

## 2024-12-20 PROCEDURE — 80053 COMPREHEN METABOLIC PANEL: CPT

## 2024-12-20 PROCEDURE — 6360000002 HC RX W HCPCS: Performed by: EMERGENCY MEDICINE

## 2024-12-20 PROCEDURE — 96375 TX/PRO/DX INJ NEW DRUG ADDON: CPT

## 2024-12-20 PROCEDURE — 96374 THER/PROPH/DIAG INJ IV PUSH: CPT

## 2024-12-20 RX ORDER — HYDROCODONE BITARTRATE AND ACETAMINOPHEN 5; 325 MG/1; MG/1
1 TABLET ORAL EVERY 6 HOURS PRN
Qty: 12 TABLET | Refills: 0 | Status: SHIPPED | OUTPATIENT
Start: 2024-12-20 | End: 2024-12-23

## 2024-12-20 RX ORDER — ONDANSETRON 2 MG/ML
4 INJECTION INTRAMUSCULAR; INTRAVENOUS ONCE
Status: COMPLETED | OUTPATIENT
Start: 2024-12-20 | End: 2024-12-20

## 2024-12-20 RX ORDER — NITROFURANTOIN 25; 75 MG/1; MG/1
100 CAPSULE ORAL ONCE
Status: COMPLETED | OUTPATIENT
Start: 2024-12-20 | End: 2024-12-20

## 2024-12-20 RX ORDER — NITROFURANTOIN 25; 75 MG/1; MG/1
100 CAPSULE ORAL 2 TIMES DAILY
Qty: 14 CAPSULE | Refills: 0 | Status: SHIPPED | OUTPATIENT
Start: 2024-12-20 | End: 2024-12-27

## 2024-12-20 RX ORDER — HYDROCODONE BITARTRATE AND ACETAMINOPHEN 5; 325 MG/1; MG/1
1 TABLET ORAL ONCE
Status: COMPLETED | OUTPATIENT
Start: 2024-12-20 | End: 2024-12-20

## 2024-12-20 RX ORDER — KETOROLAC TROMETHAMINE 30 MG/ML
30 INJECTION, SOLUTION INTRAMUSCULAR; INTRAVENOUS ONCE
Status: COMPLETED | OUTPATIENT
Start: 2024-12-20 | End: 2024-12-20

## 2024-12-20 RX ADMIN — KETOROLAC TROMETHAMINE 30 MG: 30 INJECTION, SOLUTION INTRAMUSCULAR at 20:42

## 2024-12-20 RX ADMIN — HYDROMORPHONE HYDROCHLORIDE 0.5 MG: 1 INJECTION, SOLUTION INTRAMUSCULAR; INTRAVENOUS; SUBCUTANEOUS at 21:04

## 2024-12-20 RX ADMIN — HYDROCODONE BITARTRATE AND ACETAMINOPHEN 1 TABLET: 5; 325 TABLET ORAL at 21:58

## 2024-12-20 RX ADMIN — ONDANSETRON 4 MG: 2 INJECTION INTRAMUSCULAR; INTRAVENOUS at 20:42

## 2024-12-20 RX ADMIN — NITROFURANTOIN MONOHYDRATE/MACROCRYSTALS 100 MG: 75; 25 CAPSULE ORAL at 22:38

## 2024-12-20 ASSESSMENT — PAIN SCALES - GENERAL
PAINLEVEL_OUTOF10: 10
PAINLEVEL_OUTOF10: 10
PAINLEVEL_OUTOF10: 1
PAINLEVEL_OUTOF10: 8

## 2024-12-20 ASSESSMENT — PAIN DESCRIPTION - LOCATION: LOCATION: FLANK

## 2024-12-20 ASSESSMENT — PAIN - FUNCTIONAL ASSESSMENT: PAIN_FUNCTIONAL_ASSESSMENT: 0-10

## 2024-12-20 ASSESSMENT — PAIN DESCRIPTION - ORIENTATION: ORIENTATION: RIGHT

## 2024-12-21 NOTE — ED NOTES
Patient presents with complaint of right flank pain. Rates pain 10 on 0-10 scale. Patient is tearful but cooperative. Skin is pink, warm and dry. Respirations are even and unlabored.

## 2024-12-21 NOTE — ED NOTES
Discharge teaching and instructions for condition explained to patient. AVS reviewed. Went over prescriptions with patient. Patient voiced understanding regarding prescriptions, follow up appointments and care of self at home. Pt discharged to home in stable condition per 's care.

## 2024-12-21 NOTE — ED NOTES
Pt presents to the front window with complaints of intense 10/10 right flank pain. Pain began earlier today. No history of kidney stones. History of prior lumbar fusion that needs revised in April.

## 2024-12-21 NOTE — DISCHARGE INSTRUCTIONS
Continue current medications.  Hydrocodone as needed for severe pain.  COVID as prescribed for urinary infection.  Follow-up with your primary care provider, pain management provider, and back specialist to determine the best ways to control your pain.

## 2024-12-21 NOTE — ED PROVIDER NOTES
[facility]  eMERGENCY dEPARTMENT eNCOUnter             [facility] CARE [facility]    CHIEF COMPLAINT    Chief Complaint   Patient presents with    Flank Pain     right       Nurses Notes reviewed and I agree except as noted in the HPI.    HPI    Becca Keating is a 59 y.o. female who presents with complaint of severe pain in her right flank area, onset yesterday, but much more intense today.  It was coming and going, and now it is constant.  She is having trouble finding a comfortable position.  No falls, blows, or injuries.  No dysuria.  She does have some chronic back pain, with previous surgery that \"needs to be redone\" in April 2025. Pain is 10/10, aching, constant, worse with every movement.     REVIEW OF SYSTEMS      Physical Exam  Vitals and nursing note reviewed. Exam conducted with a chaperone present.   Constitutional:       General: She is in acute distress.      Appearance: She is ill-appearing. She is not diaphoretic.   HENT:      Nose: Nose normal.      Mouth/Throat:      Mouth: Mucous membranes are moist.   Cardiovascular:      Rate and Rhythm: Normal rate and regular rhythm.      Heart sounds: No murmur heard.  Pulmonary:      Effort: Pulmonary effort is normal. No respiratory distress.      Breath sounds: Normal breath sounds. No wheezing.   Abdominal:      General: Bowel sounds are normal.      Palpations: Abdomen is soft.      Tenderness: There is right CVA tenderness. There is no rebound.   Musculoskeletal:         General: No swelling or tenderness.      Cervical back: Neck supple.      Comments: Tender lower lumbar area, postoperative changes, no erythema, heat, open area   Lymphadenopathy:      Cervical: No cervical adenopathy.   Skin:     General: Skin is warm and dry.      Findings: No erythema or rash.   Neurological:      General: No focal deficit present.      Mental Status: She is alert and oriented to person, place, and time.   Psychiatric:

## 2024-12-21 NOTE — DISCHARGE INSTR - COC
Continuity of Care Form    Patient Name: Becca Keating   :  1965  MRN:  [id number]    Admit date:  2024  Discharge date:  ***    Code Status Order: Prior   Advance Directives:   Advance Care Flowsheet Documentation             Admitting Physician:  No admitting provider for patient encounter.  PCP: Aretha Boykin, APRN - CNP    Discharging Nurse: ***  Discharging Hospital Unit/Room#: E5/E5  Discharging Unit Phone Number: ***    Emergency Contact:   Extended Emergency Contact Information  Primary Emergency Contact: Mert Keating  Address: [address]           [place] [address] [facility]  Home Phone: [phone]  Relation: Spouse  Secondary Emergency Contact: ZuriColleen           [facility]  Home Phone: [phone]  Relation: Other    Past Surgical History:  Past Surgical History:   Procedure Laterality Date    ANKLE FUSION      BACK SURGERY  05/10/2023    fusion of L3 & L4  [place]    CARPAL TUNNEL RELEASE Right      SECTION      ENDOMETRIAL ABLATION      PAIN MANAGEMENT PROCEDURE Right 2023    right transforaminal lumbar 4-5 epidural steroid injection performed by Chris Gallegos MD at [facility] OR    TOOTH EXTRACTION  2020       Immunization History:   Immunization History   Administered Date(s) Administered    COVID-19, MODERNA BLUE border, Primary or Immunocompromised, (age 12y+), IM, 100 mcg/0.5mL 2021, 2021    Influenza, AFLURIA (age 3 y+), FLUZONE, (age 6 mo+), Quadv MDV, 0.5mL 2022    Influenza, FLUCELVAX, (age 6 mo+), MDCK, Quadv PF, 0.5mL 10/22/2018, 10/21/2019    Pneumococcal, PCV20, PREVNAR 20, (age 6w+), IM, 0.5mL 2024    Pneumococcal, PPSV23, PNEUMOVAX 23, (age 2y+), SC/IM, 0.5mL 2021    TDaP, ADACEL (age 10y-64y), BOOSTRIX (age 10y+), IM, 0.5mL 2013, 2023       Active Problems:  Patient Active Problem List   Diagnosis Code

## 2024-12-22 LAB — BACTERIA UR CULT: NORMAL

## 2024-12-23 LAB
BACTERIA UR CULT: ABNORMAL
ORGANISM: ABNORMAL

## 2024-12-27 ENCOUNTER — HOSPITAL ENCOUNTER (EMERGENCY)
Age: 59
Discharge: HOME | End: 2024-12-27
Attending: EMERGENCY MEDICINE
Payer: COMMERCIAL

## 2024-12-27 ENCOUNTER — APPOINTMENT (OUTPATIENT)
Dept: CT IMAGING | Age: 59
End: 2024-12-27
Payer: COMMERCIAL

## 2024-12-27 VITALS
SYSTOLIC BLOOD PRESSURE: 136 MMHG | TEMPERATURE: 96.6 F | OXYGEN SATURATION: 96 % | RESPIRATION RATE: 20 BRPM | BODY MASS INDEX: 42.52 KG/M2 | HEART RATE: 89 BPM | HEIGHT: 63 IN | DIASTOLIC BLOOD PRESSURE: 66 MMHG | WEIGHT: 240 LBS

## 2024-12-27 DIAGNOSIS — M54.50 ACUTE EXACERBATION OF CHRONIC LOW BACK PAIN: ICD-10-CM

## 2024-12-27 DIAGNOSIS — M48.061 SPINAL STENOSIS OF LUMBAR REGION WITHOUT NEUROGENIC CLAUDICATION: Primary | ICD-10-CM

## 2024-12-27 DIAGNOSIS — G89.29 ACUTE EXACERBATION OF CHRONIC LOW BACK PAIN: ICD-10-CM

## 2024-12-27 LAB
ALBUMIN SERPL BCP-MCNC: 4.4 GM/DL (ref 3.4–5)
ALP SERPL-CCNC: 63 U/L (ref 46–116)
ALT SERPL W P-5'-P-CCNC: 33 U/L (ref 14–63)
ANION GAP SERPL CALC-SCNC: 15 MEQ/L (ref 8–16)
AST SERPL W P-5'-P-CCNC: 17 U/L (ref 15–37)
BASOPHILS # BLD: 0.6 % (ref 0–3)
BASOPHILS ABSOLUTE: 0 THOU/MM3 (ref 0–0.1)
BILIRUB SERPL-MCNC: 0.4 MG/DL (ref 0.2–1)
BUN SERPL-MCNC: 18 MG/DL (ref 7–18)
CALCIUM SERPL-MCNC: 9.5 MG/DL (ref 8.5–10.1)
CHLORIDE SERPL-SCNC: 103 MEQ/L (ref 98–107)
CO2 SERPL-SCNC: 24 MEQ/L (ref 21–32)
CREAT SERPL-MCNC: 0.9 MG/DL (ref 0.6–1.3)
EKG ATRIAL RATE: 115 BPM
EKG P AXIS: 75 DEGREES
EKG P-R INTERVAL: 150 MS
EKG Q-T INTERVAL: 316 MS
EKG QRS DURATION: 64 MS
EKG QTC CALCULATION (BAZETT): 437 MS
EKG R AXIS: -14 DEGREES
EKG T AXIS: 34 DEGREES
EKG VENTRICULAR RATE: 115 BPM
EOSINOPHILS ABSOLUTE: 0.2 THOU/MM3 (ref 0–0.5)
EOSINOPHILS RELATIVE PERCENT: 2.2 % (ref 0–4)
GFR SERPL CREATININE-BSD FRML MDRD: 74 ML/MIN/1.73M2
GLUCOSE SERPL-MCNC: 134 MG/DL (ref 74–106)
HCT VFR BLD CALC: 41.2 % (ref 37–47)
HEMOGLOBIN: 14 GM/DL (ref 12–16)
IMMATURE GRANS (ABS): 0 THOU/MM3 (ref 0–0.07)
IMMATURE GRANULOCYTES %: 0 %
LIPASE SERPL-CCNC: 70 U/L (ref 16–77)
LYMPHOCYTES # BLD AUTO: 27 % (ref 15–47)
LYMPHOCYTES ABSOLUTE: 2 THOU/MM3 (ref 1–4.8)
MCH RBC QN AUTO: 31.7 PG (ref 26–32)
MCHC RBC AUTO-ENTMCNC: 34 GM/DL (ref 31–35)
MCV RBC AUTO: 93.2 FL (ref 81–99)
MONOCYTES: 0.6 THOU/MM3 (ref 0.3–1.3)
MONOCYTES: 8.8 % (ref 0–12)
NEUTROPHILS ABSOLUTE: 4.5 THOU/MM3 (ref 1.8–7.7)
PDW BLD-RTO: 13.2 % (ref 11.5–14.9)
PLATELET # BLD AUTO: 288 THOU/MM3 (ref 130–400)
PMV BLD AUTO: 9.9 FL (ref 9.4–12.4)
POTASSIUM SERPL-SCNC: 4.2 MEQ/L (ref 3.5–5.1)
PROT SERPL-MCNC: 7.5 GM/DL (ref 6.4–8.2)
RBC # BLD: 4.42 MILL/MM3 (ref 4.1–5.3)
SEG NEUTROPHILS: 61 % (ref 43–75)
SODIUM SERPL-SCNC: 142 MEQ/L (ref 136–145)
TROPONIN, HIGH SENSITIVITY: < 4 PG/ML (ref 0–51.3)
WBC # BLD: 7.3 THOU/MM3 (ref 4.8–10.8)

## 2024-12-27 PROCEDURE — 85025 COMPLETE CBC W/AUTO DIFF WBC: CPT

## 2024-12-27 PROCEDURE — 83690 ASSAY OF LIPASE: CPT

## 2024-12-27 PROCEDURE — 6360000002 HC RX W HCPCS: Performed by: EMERGENCY MEDICINE

## 2024-12-27 PROCEDURE — 6360000004 HC RX CONTRAST MEDICATION: Performed by: EMERGENCY MEDICINE

## 2024-12-27 PROCEDURE — 96374 THER/PROPH/DIAG INJ IV PUSH: CPT

## 2024-12-27 PROCEDURE — 6370000000 HC RX 637 (ALT 250 FOR IP): Performed by: EMERGENCY MEDICINE

## 2024-12-27 PROCEDURE — 96375 TX/PRO/DX INJ NEW DRUG ADDON: CPT

## 2024-12-27 PROCEDURE — 71275 CT ANGIOGRAPHY CHEST: CPT

## 2024-12-27 PROCEDURE — 99285 EMERGENCY DEPT VISIT HI MDM: CPT

## 2024-12-27 PROCEDURE — 84484 ASSAY OF TROPONIN QUANT: CPT

## 2024-12-27 PROCEDURE — 80053 COMPREHEN METABOLIC PANEL: CPT

## 2024-12-27 PROCEDURE — 93005 ELECTROCARDIOGRAM TRACING: CPT | Performed by: EMERGENCY MEDICINE

## 2024-12-27 PROCEDURE — 93010 ELECTROCARDIOGRAM REPORT: CPT | Performed by: INTERNAL MEDICINE

## 2024-12-27 RX ORDER — KETOROLAC TROMETHAMINE 30 MG/ML
15 INJECTION, SOLUTION INTRAMUSCULAR; INTRAVENOUS ONCE
Status: COMPLETED | OUTPATIENT
Start: 2024-12-27 | End: 2024-12-27

## 2024-12-27 RX ORDER — OXYCODONE AND ACETAMINOPHEN 5; 325 MG/1; MG/1
1 TABLET ORAL EVERY 6 HOURS PRN
Qty: 12 TABLET | Refills: 0 | Status: SHIPPED | OUTPATIENT
Start: 2024-12-27 | End: 2024-12-30

## 2024-12-27 RX ORDER — IOPAMIDOL 755 MG/ML
100 INJECTION, SOLUTION INTRAVASCULAR
Status: COMPLETED | OUTPATIENT
Start: 2024-12-27 | End: 2024-12-27

## 2024-12-27 RX ORDER — CYCLOBENZAPRINE HCL 5 MG
5 TABLET ORAL 3 TIMES DAILY PRN
Qty: 12 TABLET | Refills: 0 | Status: SHIPPED | OUTPATIENT
Start: 2024-12-27

## 2024-12-27 RX ORDER — DROPERIDOL 2.5 MG/ML
1.25 INJECTION, SOLUTION INTRAMUSCULAR; INTRAVENOUS ONCE
Status: COMPLETED | OUTPATIENT
Start: 2024-12-27 | End: 2024-12-27

## 2024-12-27 RX ORDER — PREDNISONE 20 MG/1
20 TABLET ORAL 2 TIMES DAILY
Qty: 10 TABLET | Refills: 0 | Status: SHIPPED | OUTPATIENT
Start: 2024-12-27 | End: 2025-01-01

## 2024-12-27 RX ADMIN — KETOROLAC TROMETHAMINE 15 MG: 30 INJECTION, SOLUTION INTRAMUSCULAR at 19:42

## 2024-12-27 RX ADMIN — DROPERIDOL 1.25 MG: 2.5 INJECTION, SOLUTION INTRAMUSCULAR; INTRAVENOUS at 19:43

## 2024-12-27 RX ADMIN — IOPAMIDOL 100 ML: 755 INJECTION, SOLUTION INTRAVENOUS at 20:04

## 2024-12-27 ASSESSMENT — PAIN SCALES - GENERAL: PAINLEVEL_OUTOF10: 10

## 2024-12-27 ASSESSMENT — PAIN - FUNCTIONAL ASSESSMENT
PAIN_FUNCTIONAL_ASSESSMENT: 0-10
PAIN_FUNCTIONAL_ASSESSMENT: NONE - DENIES PAIN

## 2024-12-27 ASSESSMENT — PAIN DESCRIPTION - PAIN TYPE: TYPE: ACUTE PAIN

## 2024-12-27 ASSESSMENT — PAIN DESCRIPTION - LOCATION: LOCATION: BACK

## 2024-12-27 ASSESSMENT — PAIN DESCRIPTION - DESCRIPTORS: DESCRIPTORS: ACHING

## 2024-12-28 NOTE — ED TRIAGE NOTES
Pt comes into ER room Trauma 2 via [facility] with having back spasms / back pain. Pt ha back surgery 1-2 years ago that \"DIDN\"T TAKE\" and her back pain has been increasingly getting worse.

## 2024-12-28 NOTE — ED NOTES
Pt stable A&O x 3 given discharge and follow up info. Pt voiced no concerns and discharged from ER to self to home. Pt wheel chaired out of ER with no complications .

## 2024-12-28 NOTE — ED PROVIDER NOTES
No clubbing. Good range of motion in all major joints. No tenderness to palpation or major deformities noted.   Back:No CVA tenderness.  Right lateral rib chest wall tenderness  Integument:  Warm, Dry, No erythema, No rash (on exposed areas)   Neurologic:  Alert & oriented x 3, Normal motor function, Normal sensory function, No focal deficits noted.   Psychiatric:  Affect normal, Judgment normal, Mood normal.     DIAGNOSTIC RESULTS         RADIOLOGY:         Interpretation per the Radiologist below, if available at the time of this note:    CTA CHEST W WO CONTRAST   Final Result   Impression:   No pulmonary embolism.   Mild bronchial wall thickening may indicate bronchitis.      This document has been electronically signed by: Audrey Wilkins MD    on 12/27/2024 08:48 PM      All CTs at this facility use dose modulation techniques and iterative    reconstructions, and/or weight-based dosing   when appropriate to reduce radiation to a low as reasonably achievable.      3D Post-processing was performed on this study.            LABS:  Labs Reviewed   COMPREHENSIVE METABOLIC PANEL - Abnormal; Notable for the following components:       Result Value    Glucose 134 (*)     All other components within normal limits   CBC WITH AUTO DIFFERENTIAL   LIPASE   TROPONIN   GLOMERULAR FILTRATION RATE, ESTIMATED   ANION GAP       All other labs were within normal range or not returned as of this dictation.    MIPS:  Not applicable      EMERGENCY DEPARTMENT COURSE and DIFFERENTIAL DIAGNOSIS/MDM:       Patient presents with right-sided pain and discomfort appears to be more related to her chest wall chest at this time.  Hurts to twist and move.  She has had prior lumbar surgery although back and low back area are nontender.    1)  Number and Complexity of Problems  Problem List This Visit: Side pain  Problem List Items Addressed This Visit       Spinal stenosis of lumbar region without neurogenic claudication - Primary     applicable      FINAL  REASSESSMENT   8:52 PM     CTA was done and is negative although shows some bronchial thickening.  There is no other focal findings.  Counseled patient regards to care treatment and evaluation of such.  Offered her further pain medication or further evaluation.  She is feels comfortable going home.  She may follow-up as an outpatient with her back specialist.  Trial of steroids as      CRITICAL CARE TIME   None    PROCEDURES:  None  Procedures     FINAL IMPRESSION      1. Spinal stenosis of lumbar region without neurogenic claudication    2. Acute exacerbation of chronic low back pain          DISPOSITION/PLAN     DISPOSITION Decision To Discharge 12/27/2024 08:52:04 PM   DISPOSITION CONDITION Stable           PATIENT REFERRED TO:  Aretha Boykin, APRN - CNP  [address] [id number]  [phone]    Call   Follow up from ER condition      DISCHARGE MEDICATIONS:  New Prescriptions    CYCLOBENZAPRINE (FLEXERIL) 5 MG TABLET    Take 1 tablet by mouth 3 times daily as needed for Muscle spasms    OXYCODONE-ACETAMINOPHEN (PERCOCET) 5-325 MG PER TABLET    Take 1 tablet by mouth every 6 hours as needed for Pain for up to 3 days. Intended supply: 3 days. Take lowest dose possible to manage pain Max Daily Amount: 4 tablets    PREDNISONE (DELTASONE) 20 MG TABLET    Take 1 tablet by mouth 2 times daily for 5 days       (Please note that portions of this note were completed with a voice recognition program.  Efforts were made to edit the dictations but occasionally words are mis-transcribed.)    Owen Sweeney MD (electronically signed)  Attending Emergency Physician                       Owen Sweeney MD  12/27/24 0847

## 2024-12-28 NOTE — ED NOTES
Pt stable and off to Radiology via ED cart with [employer] tech. Pt states no concerns and vitals stable.

## 2024-12-28 NOTE — DISCHARGE INSTRUCTIONS
Continue Flexeril as a muscle relaxant.  Do not drive or operate machinery tonight or while taking Flexeril or other pain medication.  Tonight you may take Norco at home 1 or 2 every 4-6 hours overnight.  Then switch to Percocet/oxycodone starting tomorrow as needed.  Do not drive or operate machinery while taking Percocet.  Call your back specialist to have close follow-up.

## 2025-01-20 ENCOUNTER — HOSPITAL ENCOUNTER (OUTPATIENT)
Dept: CT IMAGING | Age: 60
Discharge: HOME | End: 2025-01-20
Payer: COMMERCIAL

## 2025-01-20 ENCOUNTER — HOSPITAL ENCOUNTER (OUTPATIENT)
Dept: GENERAL RADIOLOGY | Age: 60
Discharge: HOME | End: 2025-01-20
Payer: COMMERCIAL

## 2025-01-20 ENCOUNTER — HOSPITAL ENCOUNTER (OUTPATIENT)
Dept: MRI IMAGING | Age: 60
Discharge: HOME | End: 2025-01-20
Payer: COMMERCIAL

## 2025-01-20 DIAGNOSIS — M54.50 LUMBAR PAIN: ICD-10-CM

## 2025-01-20 DIAGNOSIS — M54.50 LUMBAR BACK PAIN: ICD-10-CM

## 2025-01-20 PROCEDURE — 72100 X-RAY EXAM L-S SPINE 2/3 VWS: CPT

## 2025-01-20 PROCEDURE — 72148 MRI LUMBAR SPINE W/O DYE: CPT

## 2025-01-20 PROCEDURE — 72131 CT LUMBAR SPINE W/O DYE: CPT

## 2025-02-11 ENCOUNTER — HOSPITAL ENCOUNTER (OUTPATIENT)
Age: 60
Discharge: HOME | End: 2025-02-11
Payer: COMMERCIAL

## 2025-02-11 LAB
ALBUMIN SERPL BCG-MCNC: 4.6 G/DL (ref 3.5–5.1)
ANION GAP SERPL CALC-SCNC: 11 MEQ/L (ref 8–16)
BASOPHILS ABSOLUTE: 0.1 THOU/MM3 (ref 0–0.1)
BASOPHILS NFR BLD AUTO: 1.4 %
BUN SERPL-MCNC: 17 MG/DL (ref 7–22)
CALCIUM SERPL-MCNC: 9.4 MG/DL (ref 8.5–10.5)
CHLORIDE SERPL-SCNC: 106 MEQ/L (ref 98–111)
CO2 SERPL-SCNC: 25 MEQ/L (ref 23–33)
CREAT SERPL-MCNC: 0.7 MG/DL (ref 0.4–1.2)
DEPRECATED MEAN GLUCOSE BLD GHB EST-ACNC: 99 MG/DL (ref 70–126)
DEPRECATED RDW RBC AUTO: 44.9 FL (ref 35–45)
EOSINOPHIL NFR BLD AUTO: 5.4 %
EOSINOPHILS ABSOLUTE: 0.2 THOU/MM3 (ref 0–0.4)
ERYTHROCYTE [DISTWIDTH] IN BLOOD BY AUTOMATED COUNT: 13.2 % (ref 11.5–14.5)
GFR SERPL CREATININE-BSD FRML MDRD: > 90 ML/MIN/1.73M2
GLUCOSE SERPL-MCNC: 112 MG/DL (ref 70–108)
HBA1C MFR BLD HPLC: 5.3 % (ref 4.4–6.4)
HCT VFR BLD AUTO: 40 % (ref 37–47)
HGB BLD-MCNC: 13.1 GM/DL (ref 12–16)
IMM GRANULOCYTES # BLD AUTO: 0.02 THOU/MM3 (ref 0–0.07)
IMM GRANULOCYTES NFR BLD AUTO: 0.5 %
LYMPHOCYTES ABSOLUTE: 1.2 THOU/MM3 (ref 1–4.8)
LYMPHOCYTES NFR BLD AUTO: 27.7 %
MCH RBC QN AUTO: 31 PG (ref 26–33)
MCHC RBC AUTO-ENTMCNC: 32.8 GM/DL (ref 32.2–35.5)
MCV RBC AUTO: 94.6 FL (ref 81–99)
MONOCYTES ABSOLUTE: 0.4 THOU/MM3 (ref 0.4–1.3)
MONOCYTES NFR BLD AUTO: 9.1 %
MRSA DNA SPEC QL NAA+PROBE: NEGATIVE
NEUTROPHILS ABSOLUTE: 2.4 THOU/MM3 (ref 1.8–7.7)
NEUTROPHILS NFR BLD AUTO: 55.9 %
NRBC BLD AUTO-RTO: 0 /100 WBC
PLATELET # BLD AUTO: 244 THOU/MM3 (ref 130–400)
PMV BLD AUTO: 11.1 FL (ref 9.4–12.4)
POTASSIUM SERPL-SCNC: 4.4 MEQ/L (ref 3.5–5.2)
PREALB SERPL-MCNC: 26.1 MG/DL (ref 20–40)
RBC # BLD AUTO: 4.23 MILL/MM3 (ref 4.2–5.4)
SODIUM SERPL-SCNC: 142 MEQ/L (ref 135–145)
WBC # BLD AUTO: 4.3 THOU/MM3 (ref 4.8–10.8)

## 2025-02-11 PROCEDURE — 93005 ELECTROCARDIOGRAM TRACING: CPT | Performed by: ORTHOPAEDIC SURGERY

## 2025-02-11 PROCEDURE — 85025 COMPLETE CBC W/AUTO DIFF WBC: CPT

## 2025-02-11 PROCEDURE — 80048 BASIC METABOLIC PNL TOTAL CA: CPT

## 2025-02-11 PROCEDURE — 84134 ASSAY OF PREALBUMIN: CPT

## 2025-02-11 PROCEDURE — 83036 HEMOGLOBIN GLYCOSYLATED A1C: CPT

## 2025-02-11 PROCEDURE — 36415 COLL VENOUS BLD VENIPUNCTURE: CPT

## 2025-02-11 PROCEDURE — 87641 MR-STAPH DNA AMP PROBE: CPT

## 2025-02-11 PROCEDURE — 82040 ASSAY OF SERUM ALBUMIN: CPT

## 2025-02-12 LAB
EKG ATRIAL RATE: 92 BPM
EKG P AXIS: 61 DEGREES
EKG P-R INTERVAL: 154 MS
EKG Q-T INTERVAL: 362 MS
EKG QRS DURATION: 86 MS
EKG QTC CALCULATION (BAZETT): 447 MS
EKG R AXIS: -5 DEGREES
EKG T AXIS: 18 DEGREES
EKG VENTRICULAR RATE: 92 BPM

## 2025-02-12 PROCEDURE — 93010 ELECTROCARDIOGRAM REPORT: CPT | Performed by: INTERNAL MEDICINE

## 2025-02-26 ENCOUNTER — HOSPITAL ENCOUNTER (OUTPATIENT)
Dept: NUCLEAR MEDICINE | Age: 60
Discharge: HOME | End: 2025-02-26
Payer: COMMERCIAL

## 2025-02-26 ENCOUNTER — HOSPITAL ENCOUNTER (OUTPATIENT)
Age: 60
Discharge: HOME | End: 2025-02-28
Payer: COMMERCIAL

## 2025-02-26 VITALS — HEIGHT: 63 IN | WEIGHT: 241 LBS | BODY MASS INDEX: 42.7 KG/M2

## 2025-02-26 DIAGNOSIS — R94.31 ABNORMAL EKG: ICD-10-CM

## 2025-02-26 DIAGNOSIS — Z01.810 PRE-OPERATIVE CARDIOVASCULAR EXAMINATION: ICD-10-CM

## 2025-02-26 DIAGNOSIS — R94.31 ABNORMAL ELECTROCARDIOGRAPHY: ICD-10-CM

## 2025-02-26 LAB
ECHO BSA: 2.2 M2
NUC STRESS EJECTION FRACTION: 63 %
STRESS BASELINE DIAS BP: 69 MMHG
STRESS BASELINE HR: 84 BPM
STRESS BASELINE SYS BP: 129 MMHG
STRESS ESTIMATED WORKLOAD: 1 METS
STRESS PEAK DIAS BP: 79 MMHG
STRESS PEAK SYS BP: 146 MMHG
STRESS PERCENT HR ACHIEVED: 67 %
STRESS POST PEAK HR: 109 BPM
STRESS RATE PRESSURE PRODUCT: NORMAL BPM*MMHG
STRESS STAGE 1 BP: NORMAL MMHG
STRESS STAGE 1 DURATION: 1 MIN:SEC
STRESS STAGE 1 HR: 104 BPM
STRESS STAGE 2 BP: NORMAL MMHG
STRESS STAGE 2 DURATION: 1 MIN:SEC
STRESS STAGE 2 HR: 108 BPM
STRESS STAGE 3 BP: NORMAL MMHG
STRESS STAGE 3 DURATION: 1 MIN:SEC
STRESS STAGE 3 HR: 102 BPM
STRESS STAGE RECOVERY 1 BP: NORMAL MMHG
STRESS STAGE RECOVERY 1 DURATION: 1 MIN:SEC
STRESS STAGE RECOVERY 1 HR: 100 BPM
STRESS STAGE RECOVERY 2 BP: NORMAL MMHG
STRESS STAGE RECOVERY 2 DURATION: 1 MIN:SEC
STRESS STAGE RECOVERY 2 HR: 94 BPM
STRESS STAGE RECOVERY 3 BP: NORMAL MMHG
STRESS STAGE RECOVERY 3 DURATION: 1 MIN:SEC
STRESS STAGE RECOVERY 3 HR: 94 BPM
STRESS STAGE RECOVERY 4 BP: NORMAL MMHG
STRESS STAGE RECOVERY 4 DURATION: 2 MIN:SEC
STRESS STAGE RECOVERY 4 HR: 88 BPM
STRESS TARGET HR: 163 BPM

## 2025-02-26 PROCEDURE — 78452 HT MUSCLE IMAGE SPECT MULT: CPT

## 2025-02-26 PROCEDURE — 3430000000 HC RX DIAGNOSTIC RADIOPHARMACEUTICAL: Performed by: FAMILY MEDICINE

## 2025-02-26 PROCEDURE — A9500 TC99M SESTAMIBI: HCPCS | Performed by: FAMILY MEDICINE

## 2025-02-26 PROCEDURE — 6360000002 HC RX W HCPCS: Performed by: FAMILY MEDICINE

## 2025-02-26 PROCEDURE — 93017 CV STRESS TEST TRACING ONLY: CPT

## 2025-02-26 RX ORDER — TETRAKIS(2-METHOXYISOBUTYLISOCYANIDE)COPPER(I) TETRAFLUOROBORATE 1 MG/ML
9.3 INJECTION, POWDER, LYOPHILIZED, FOR SOLUTION INTRAVENOUS
Status: COMPLETED | OUTPATIENT
Start: 2025-02-26 | End: 2025-02-26

## 2025-02-26 RX ORDER — TETRAKIS(2-METHOXYISOBUTYLISOCYANIDE)COPPER(I) TETRAFLUOROBORATE 1 MG/ML
33.1 INJECTION, POWDER, LYOPHILIZED, FOR SOLUTION INTRAVENOUS
Status: COMPLETED | OUTPATIENT
Start: 2025-02-26 | End: 2025-02-26

## 2025-02-26 RX ORDER — REGADENOSON 0.08 MG/ML
0.4 INJECTION, SOLUTION INTRAVENOUS
Status: COMPLETED | OUTPATIENT
Start: 2025-02-26 | End: 2025-02-26

## 2025-02-26 RX ADMIN — Medication 9.3 MILLICURIE: at 07:27

## 2025-02-26 RX ADMIN — Medication 33.1 MILLICURIE: at 08:12

## 2025-02-26 RX ADMIN — REGADENOSON 0.4 MG: 0.08 INJECTION, SOLUTION INTRAVENOUS at 08:10

## 2025-03-13 ENCOUNTER — HOSPITAL ENCOUNTER (EMERGENCY)
Age: 60
Discharge: HOME | End: 2025-03-13
Attending: FAMILY MEDICINE
Payer: COMMERCIAL

## 2025-03-13 ENCOUNTER — APPOINTMENT (OUTPATIENT)
Dept: CT IMAGING | Age: 60
End: 2025-03-13
Attending: EMERGENCY MEDICINE
Payer: COMMERCIAL

## 2025-03-13 ENCOUNTER — HOSPITAL ENCOUNTER (EMERGENCY)
Age: 60
Discharge: HOME | End: 2025-03-13
Attending: EMERGENCY MEDICINE
Payer: COMMERCIAL

## 2025-03-13 ENCOUNTER — HOSPITAL ENCOUNTER (EMERGENCY)
Dept: INTERVENTIONAL RADIOLOGY/VASCULAR | Age: 60
Discharge: HOME | End: 2025-03-15
Attending: EMERGENCY MEDICINE
Payer: COMMERCIAL

## 2025-03-13 VITALS
SYSTOLIC BLOOD PRESSURE: 118 MMHG | BODY MASS INDEX: 44.47 KG/M2 | HEART RATE: 106 BPM | HEIGHT: 63 IN | DIASTOLIC BLOOD PRESSURE: 56 MMHG | WEIGHT: 251 LBS | RESPIRATION RATE: 18 BRPM | TEMPERATURE: 97.8 F | OXYGEN SATURATION: 96 %

## 2025-03-13 VITALS
HEIGHT: 63 IN | HEART RATE: 98 BPM | DIASTOLIC BLOOD PRESSURE: 58 MMHG | OXYGEN SATURATION: 95 % | RESPIRATION RATE: 20 BRPM | BODY MASS INDEX: 44.47 KG/M2 | WEIGHT: 251 LBS | SYSTOLIC BLOOD PRESSURE: 114 MMHG | TEMPERATURE: 98 F

## 2025-03-13 DIAGNOSIS — I82.442 ACUTE DEEP VEIN THROMBOSIS (DVT) OF TIBIAL VEIN OF LEFT LOWER EXTREMITY (HCC): Primary | ICD-10-CM

## 2025-03-13 DIAGNOSIS — M79.89 LEG SWELLING: Primary | ICD-10-CM

## 2025-03-13 DIAGNOSIS — D35.01 ADRENAL ADENOMA, RIGHT: ICD-10-CM

## 2025-03-13 LAB
ALBUMIN SERPL BCP-MCNC: 3.5 GM/DL (ref 3.4–5)
ALP SERPL-CCNC: 70 U/L (ref 46–116)
ALT SERPL W P-5'-P-CCNC: 26 U/L (ref 14–63)
ANION GAP SERPL CALC-SCNC: 9 MEQ/L (ref 8–16)
APTT: 36.2 SECONDS (ref 22–38)
AST SERPL W P-5'-P-CCNC: 13 U/L (ref 15–37)
BASOPHILS # BLD: 0.8 % (ref 0–3)
BASOPHILS ABSOLUTE: 0 THOU/MM3 (ref 0–0.1)
BILIRUB SERPL-MCNC: 0.3 MG/DL (ref 0.2–1)
BUN SERPL-MCNC: 11 MG/DL (ref 7–18)
CALCIUM SERPL-MCNC: 9 MG/DL (ref 8.5–10.1)
CHLORIDE SERPL-SCNC: 106 MEQ/L (ref 98–107)
CO2 SERPL-SCNC: 28 MEQ/L (ref 21–32)
CREAT SERPL-MCNC: 0.9 MG/DL (ref 0.6–1.3)
ECHO BSA: 2.25 M2
EKG ATRIAL RATE: 103 BPM
EKG P AXIS: 60 DEGREES
EKG P-R INTERVAL: 144 MS
EKG Q-T INTERVAL: 346 MS
EKG QRS DURATION: 80 MS
EKG QTC CALCULATION (BAZETT): 453 MS
EKG R AXIS: -6 DEGREES
EKG T AXIS: 9 DEGREES
EKG VENTRICULAR RATE: 103 BPM
EOSINOPHILS ABSOLUTE: 0.3 THOU/MM3 (ref 0–0.5)
EOSINOPHILS RELATIVE PERCENT: 6.4 % (ref 0–4)
GFR SERPL CREATININE-BSD FRML MDRD: 74 ML/MIN/1.73M2
GLUCOSE SERPL-MCNC: 147 MG/DL (ref 74–106)
HCT VFR BLD CALC: 30.8 % (ref 37–47)
HEMOGLOBIN: 9.8 GM/DL (ref 12–16)
IMMATURE GRANS (ABS): 0.11 THOU/MM3 (ref 0–0.07)
IMMATURE GRANULOCYTES %: 2 %
INR BLD: 1.08 (ref 0.85–1.13)
LYMPHOCYTES # BLD AUTO: 19.3 % (ref 15–47)
LYMPHOCYTES ABSOLUTE: 1 THOU/MM3 (ref 1–4.8)
MCH RBC QN AUTO: 30.4 PG (ref 26–32)
MCHC RBC AUTO-ENTMCNC: 31.8 GM/DL (ref 31–35)
MCV RBC AUTO: 95.7 FL (ref 81–99)
MONOCYTES: 0.4 THOU/MM3 (ref 0.3–1.3)
MONOCYTES: 7.2 % (ref 0–12)
NEUTROPHILS ABSOLUTE: 3.4 THOU/MM3 (ref 1.8–7.7)
NT PRO BNP: 41 PG/ML (ref 0–125)
PDW BLD-RTO: 13.6 % (ref 11.5–14.9)
PLATELET # BLD AUTO: 284 THOU/MM3 (ref 130–400)
PMV BLD AUTO: 9.3 FL (ref 9.4–12.4)
POTASSIUM SERPL-SCNC: 4 MEQ/L (ref 3.5–5.1)
PROT SERPL-MCNC: 6.3 GM/DL (ref 6.4–8.2)
RBC # BLD: 3.22 MILL/MM3 (ref 4.1–5.3)
SEG NEUTROPHILS: 64.6 % (ref 43–75)
SODIUM SERPL-SCNC: 143 MEQ/L (ref 136–145)
TROPONIN, HIGH SENSITIVITY: < 4 PG/ML (ref 0–51.3)
WBC # BLD: 5.3 THOU/MM3 (ref 4.8–10.8)

## 2025-03-13 PROCEDURE — 71275 CT ANGIOGRAPHY CHEST: CPT

## 2025-03-13 PROCEDURE — 93970 EXTREMITY STUDY: CPT

## 2025-03-13 PROCEDURE — 85730 THROMBOPLASTIN TIME PARTIAL: CPT

## 2025-03-13 PROCEDURE — 85610 PROTHROMBIN TIME: CPT

## 2025-03-13 PROCEDURE — 99285 EMERGENCY DEPT VISIT HI MDM: CPT

## 2025-03-13 PROCEDURE — 84484 ASSAY OF TROPONIN QUANT: CPT

## 2025-03-13 PROCEDURE — 99283 EMERGENCY DEPT VISIT LOW MDM: CPT

## 2025-03-13 PROCEDURE — 6360000004 HC RX CONTRAST MEDICATION: Performed by: EMERGENCY MEDICINE

## 2025-03-13 PROCEDURE — 93010 ELECTROCARDIOGRAM REPORT: CPT | Performed by: INTERNAL MEDICINE

## 2025-03-13 PROCEDURE — 6360000002 HC RX W HCPCS: Performed by: EMERGENCY MEDICINE

## 2025-03-13 PROCEDURE — 85025 COMPLETE CBC W/AUTO DIFF WBC: CPT

## 2025-03-13 PROCEDURE — 6370000000 HC RX 637 (ALT 250 FOR IP): Performed by: FAMILY MEDICINE

## 2025-03-13 PROCEDURE — 83880 ASSAY OF NATRIURETIC PEPTIDE: CPT

## 2025-03-13 PROCEDURE — 96374 THER/PROPH/DIAG INJ IV PUSH: CPT

## 2025-03-13 PROCEDURE — 93005 ELECTROCARDIOGRAM TRACING: CPT | Performed by: EMERGENCY MEDICINE

## 2025-03-13 PROCEDURE — 80053 COMPREHEN METABOLIC PANEL: CPT

## 2025-03-13 RX ORDER — IOPAMIDOL 755 MG/ML
100 INJECTION, SOLUTION INTRAVASCULAR
Status: COMPLETED | OUTPATIENT
Start: 2025-03-13 | End: 2025-03-13

## 2025-03-13 RX ORDER — FUROSEMIDE 10 MG/ML
20 INJECTION INTRAMUSCULAR; INTRAVENOUS ONCE
Status: DISCONTINUED | OUTPATIENT
Start: 2025-03-13 | End: 2025-03-13

## 2025-03-13 RX ORDER — FUROSEMIDE 20 MG/1
20 TABLET ORAL DAILY
Qty: 5 TABLET | Refills: 0 | Status: SHIPPED | OUTPATIENT
Start: 2025-03-13

## 2025-03-13 RX ORDER — FUROSEMIDE 10 MG/ML
20 INJECTION INTRAMUSCULAR; INTRAVENOUS ONCE
Status: COMPLETED | OUTPATIENT
Start: 2025-03-13 | End: 2025-03-13

## 2025-03-13 RX ORDER — HYDROCODONE BITARTRATE AND ACETAMINOPHEN 5; 325 MG/1; MG/1
2 TABLET ORAL EVERY 6 HOURS PRN
COMMUNITY

## 2025-03-13 RX ADMIN — IOPAMIDOL 100 ML: 755 INJECTION, SOLUTION INTRAVENOUS at 12:49

## 2025-03-13 RX ADMIN — APIXABAN 10 MG: 5 TABLET, FILM COATED ORAL at 17:54

## 2025-03-13 RX ADMIN — FUROSEMIDE 20 MG: 10 INJECTION, SOLUTION INTRAMUSCULAR; INTRAVENOUS at 13:11

## 2025-03-13 ASSESSMENT — PAIN - FUNCTIONAL ASSESSMENT
PAIN_FUNCTIONAL_ASSESSMENT: NONE - DENIES PAIN
PAIN_FUNCTIONAL_ASSESSMENT: 0-10

## 2025-03-13 ASSESSMENT — PAIN DESCRIPTION - LOCATION: LOCATION: BACK

## 2025-03-13 ASSESSMENT — PAIN SCALES - GENERAL: PAINLEVEL_OUTOF10: 2

## 2025-03-13 ASSESSMENT — PAIN DESCRIPTION - PAIN TYPE: TYPE: ACUTE PAIN

## 2025-03-13 NOTE — DISCHARGE INSTRUCTIONS
Get ultrasound of your legs today.  If you have blood clots in your legs they will send you to the main emergency department for further evaluation.  If this test is normal or shows no clots we will send you home and have you start your water pills for the next few days.  
supervision

## 2025-03-13 NOTE — ED TRIAGE NOTES
Patient presents to the ed from vascular from [facility]. Pt states that she started having lower leg swelling. Pt states that she had doppler studies done and was positive so they told her to come in for blood thinners. Pt states that the pain she is having is normal from her post op.

## 2025-03-13 NOTE — ED PROVIDER NOTES
[facility] Emergency Department  [address]  [facility] [address]  Phone: [phone]  EMERGENCY DEPARTMENT ENCOUNTER      Pt Name: Becca Keating  MRN: [id number]  Birthdate 1965  Date of evaluation: 3/13/2025  Provider: Owen Sweeney MD    CHIEF COMPLAINT       Chief Complaint   Patient presents with    Leg Swelling     bilateral         HISTORY OF PRESENT ILLNESS      Becca Keating is a 59 y.o. female who presents to the emergency department with above noted complaint.  Patient is been doing fine has had recent back surgery.  She was in the surgical center for 3 days.  Subsequently has developed some lower extremity swelling.  Bilateral legs although feels like it is more in her right although both are swollen.  She states she has gained 10 pounds.  She denies other symptoms chest pain nausea vomiting diaphoresis or other problems.        REVIEW OF SYSTEMS     Positive for swelling.  Review of Systems  All systems negative except as marked.     PAST MEDICAL HISTORY     Past Medical History:   Diagnosis Date    Arthritis     Diabetes mellitus (HCC)     Headache(784.0)     Thyroid disease          SURGICAL HISTORY       Past Surgical History:   Procedure Laterality Date    ANKLE FUSION      BACK SURGERY  05/10/2023    fusion of L3 & L4  [place]    CARPAL TUNNEL RELEASE Right      SECTION      ENDOMETRIAL ABLATION      PAIN MANAGEMENT PROCEDURE Right 2023    right transforaminal lumbar 4-5 epidural steroid injection performed by Chris Gallegos MD at [facility] [place] [facility]    TOOTH EXTRACTION  2020         CURRENT MEDICATIONS       Previous Medications    ASPIRIN-ACETAMINOPHEN-CAFFEINE (EXCEDRIN MIGRAINE) 250-250-65 MG PER TABLET    Take 1 tablet by mouth every 6 hours as needed for Headaches    CYCLOBENZAPRINE (FLEXERIL) 5 MG TABLET    Take 1 tablet by mouth 3 times daily as needed for Muscle spasms    DIPHENHYDRAMINE (BENADRYL) 50 MG CAPSULE

## 2025-03-13 NOTE — ED PROVIDER NOTES
[facility]  EMERGENCY DEPARTMENT ENCOUNTER          Pt Name: Becca Keating  MRN: [id number]  Birthdate 1965  Date of evaluation: 3/13/2025  Treating Resident Physician: Eric Garibay MD  Supervising Physician: Sudhir Scott    History obtained from the patient.    CHIEF COMPLAINT       Chief Complaint   Patient presents with    Swelling           HISTORY OF PRESENT ILLNESS    HPI  Becca Keating is a 59 y.o. female who presents to the emergency department for evaluation of bilateral leg swelling. Patient presented to [facility] ED this morning due to b/l leg swelling that started 2 days ago. No pain in the legs, redness, warmth. Denies chest pain, palpitations or sob.  Recent h/o back surgery 3/3 with minimal mobilization. She was worked up at [facility] and given a dose of Lasix. Was referred to [facility] as doppler US is not available there. US doppler significant for left posterior tibial vein, seen in [facility] ED for management.No h/o GI bleed/ICH/ previous anticoagulation.       The patient has no other acute complaints at this time.         PAST MEDICAL AND SURGICAL HISTORY     Past Medical History:   Diagnosis Date    Arthritis     Diabetes mellitus (HCC)     Headache(784.0)     Thyroid disease      Past Surgical History:   Procedure Laterality Date    ANKLE FUSION      BACK SURGERY  05/10/2023    fusion of L3 & L4  [place]    CARPAL TUNNEL RELEASE Right      SECTION      ENDOMETRIAL ABLATION      PAIN MANAGEMENT PROCEDURE Right 2023    right transforaminal lumbar 4-5 epidural steroid injection performed by Chris Gallegos MD at [facility] [place]    TOOTH EXTRACTION  2020         MEDICATIONS   No current facility-administered medications for this encounter.    Current Outpatient Medications:     HYDROcodone-acetaminophen (NORCO) 5-325 MG per tablet, Take 2 tablets by mouth every 6 hours as needed for Pain. Max Daily Amount: 8

## 2025-03-13 NOTE — DISCHARGE INSTRUCTIONS
You came in for evaluation of leg swelling today. Your workup was significant for a blood clot in the left leg. Plan to start on blood thinners, take as instructed. If you develop any significant bleeding, stop the medication and come to ED. Follow up with  your PCP

## 2025-03-13 NOTE — ED NOTES
Pt arrived to ED1, c/o bilateral lower leg swelling and swelling noted in her butt.  Pt reports that she had surgery 3/3/25.  Pt states she woke up yesterday afternoon from her nap when she noticed swelling in both of her lower legs and her butt.  Pt states the swelling had gotten better since then, but she is still noting swelling.  Pt also notes shortness of breath when she is up and moving, but states it gets better when she is sitting or at rest.  Pt states that she feels a lot better than she did yesterday, but she had tried calling the office yesterday and didn't hear back so she called again today and they instructed her to come to the ER.  Pt also notes that she has been having issues with her sugar dropping so she has not taken her metformin since yesterday morning.  Pt's gait slow, but steady with her wheeled walker, respirations easy and unlabored, skin is pink, warm, and dry.

## 2025-03-13 NOTE — ED NOTES
Pt ambulated out of department at this time to check in for her Vascular US and then depart for her Vascular US of the bilateral lower extremities to be performed at [facility]  Pt's gait slow, but steady with her wheeled walker.  Pt's respirations easy and unlabored, skin is pink, warm, and dry.

## 2025-03-14 PROBLEM — I82.442 ACUTE DEEP VEIN THROMBOSIS (DVT) OF TIBIAL VEIN OF LEFT LOWER EXTREMITY (HCC): Status: ACTIVE | Noted: 2025-03-14

## 2025-03-14 PROBLEM — E11.69 TYPE 2 DIABETES MELLITUS WITH OTHER SPECIFIED COMPLICATION, UNSPECIFIED WHETHER LONG TERM INSULIN USE (HCC): Status: ACTIVE | Noted: 2024-08-08

## 2025-06-26 PROBLEM — B35.1 FUNGAL INFECTION OF TOENAIL: Status: RESOLVED | Noted: 2021-09-07 | Resolved: 2025-06-26

## 2025-06-26 PROBLEM — F32.A DEPRESSION: Status: RESOLVED | Noted: 2024-08-08 | Resolved: 2025-06-26

## 2025-06-26 PROBLEM — F17.200 SMOKER: Status: RESOLVED | Noted: 2021-09-07 | Resolved: 2025-06-26

## 2025-06-28 ENCOUNTER — HOSPITAL ENCOUNTER (OUTPATIENT)
Age: 60
Discharge: HOME | End: 2025-06-28
Payer: COMMERCIAL

## 2025-06-28 DIAGNOSIS — E66.813 CLASS 3 SEVERE OBESITY WITHOUT SERIOUS COMORBIDITY WITH BODY MASS INDEX (BMI) OF 40.0 TO 44.9 IN ADULT, UNSPECIFIED OBESITY TYPE (HCC): ICD-10-CM

## 2025-06-28 DIAGNOSIS — E11.69 TYPE 2 DIABETES MELLITUS WITH OTHER SPECIFIED COMPLICATION, UNSPECIFIED WHETHER LONG TERM INSULIN USE (HCC): ICD-10-CM

## 2025-06-28 DIAGNOSIS — Z79.01 ANTICOAGULATED: ICD-10-CM

## 2025-06-28 DIAGNOSIS — E55.9 VITAMIN D DEFICIENCY: ICD-10-CM

## 2025-06-28 DIAGNOSIS — E03.9 ACQUIRED HYPOTHYROIDISM: ICD-10-CM

## 2025-06-28 LAB
25(OH)D3 SERPL-MCNC: 24 NG/ML (ref 30–100)
ALBUMIN SERPL BCG-MCNC: 4.5 G/DL (ref 3.4–4.9)
ALP SERPL-CCNC: 64 U/L (ref 38–126)
ALT SERPL W/O P-5'-P-CCNC: 31 U/L (ref 10–35)
ANION GAP SERPL CALC-SCNC: 12 MEQ/L (ref 8–16)
AST SERPL-CCNC: 24 U/L (ref 10–35)
BASOPHILS ABSOLUTE: 0.1 THOU/MM3 (ref 0–0.1)
BASOPHILS NFR BLD AUTO: 2 %
BILIRUB SERPL-MCNC: 0.4 MG/DL (ref 0.3–1.2)
BUN SERPL-MCNC: 13 MG/DL (ref 8–23)
CALCIUM SERPL-MCNC: 9.4 MG/DL (ref 8.6–10)
CHLORIDE SERPL-SCNC: 106 MEQ/L (ref 98–111)
CHOLEST SERPL-MCNC: 159 MG/DL (ref 100–199)
CO2 SERPL-SCNC: 23 MEQ/L (ref 22–29)
CREAT SERPL-MCNC: 0.8 MG/DL (ref 0.5–0.9)
DEPRECATED RDW RBC AUTO: 49.7 FL (ref 35–45)
EOSINOPHIL NFR BLD AUTO: 6.1 %
EOSINOPHILS ABSOLUTE: 0.3 THOU/MM3 (ref 0–0.4)
ERYTHROCYTE [DISTWIDTH] IN BLOOD BY AUTOMATED COUNT: 15.4 % (ref 11.5–14.5)
GFR SERPL CREATININE-BSD FRML MDRD: 85 ML/MIN/1.73M2
GLUCOSE SERPL-MCNC: 101 MG/DL (ref 74–109)
HCT VFR BLD AUTO: 42.1 % (ref 37–47)
HDLC SERPL-MCNC: 39 MG/DL
HGB BLD-MCNC: 13.4 GM/DL (ref 12–16)
IMM GRANULOCYTES # BLD AUTO: 0.01 THOU/MM3 (ref 0–0.07)
IMM GRANULOCYTES NFR BLD AUTO: 0.2 %
LDLC SERPL CALC-MCNC: 93 MG/DL
LYMPHOCYTES ABSOLUTE: 1.3 THOU/MM3 (ref 1–4.8)
LYMPHOCYTES NFR BLD AUTO: 29 %
MCH RBC QN AUTO: 28.2 PG (ref 26–33)
MCHC RBC AUTO-ENTMCNC: 31.8 GM/DL (ref 32.2–35.5)
MCV RBC AUTO: 88.6 FL (ref 81–99)
MONOCYTES ABSOLUTE: 0.4 THOU/MM3 (ref 0.4–1.3)
MONOCYTES NFR BLD AUTO: 8.1 %
NEUTROPHILS ABSOLUTE: 2.5 THOU/MM3 (ref 1.8–7.7)
NEUTROPHILS NFR BLD AUTO: 54.6 %
NRBC BLD AUTO-RTO: 0 /100 WBC
PLATELET # BLD AUTO: 276 THOU/MM3 (ref 130–400)
PMV BLD AUTO: 11.1 FL (ref 9.4–12.4)
POTASSIUM SERPL-SCNC: 4.4 MEQ/L (ref 3.5–5.2)
PROT SERPL-MCNC: 7 G/DL (ref 6.4–8.3)
RBC # BLD AUTO: 4.75 MILL/MM3 (ref 4.2–5.4)
SODIUM SERPL-SCNC: 141 MEQ/L (ref 135–145)
T4 FREE SERPL-MCNC: 0.7 NG/DL (ref 0.92–1.68)
TRIGL SERPL-MCNC: 135 MG/DL (ref 0–199)
TSH SERPL DL<=0.05 MIU/L-ACNC: 12.4 UIU/ML (ref 0.27–4.2)
WBC # BLD AUTO: 4.6 THOU/MM3 (ref 4.8–10.8)

## 2025-06-28 PROCEDURE — 84439 ASSAY OF FREE THYROXINE: CPT

## 2025-06-28 PROCEDURE — 80061 LIPID PANEL: CPT

## 2025-06-28 PROCEDURE — 80053 COMPREHEN METABOLIC PANEL: CPT

## 2025-06-28 PROCEDURE — 36415 COLL VENOUS BLD VENIPUNCTURE: CPT

## 2025-06-28 PROCEDURE — 85025 COMPLETE CBC W/AUTO DIFF WBC: CPT

## 2025-06-28 PROCEDURE — 84443 ASSAY THYROID STIM HORMONE: CPT

## 2025-06-28 PROCEDURE — 82306 VITAMIN D 25 HYDROXY: CPT

## 2025-08-04 ENCOUNTER — HOSPITAL ENCOUNTER (OUTPATIENT)
Dept: GENERAL RADIOLOGY | Age: 60
Discharge: HOME | End: 2025-08-04
Payer: COMMERCIAL

## 2025-08-04 ENCOUNTER — HOSPITAL ENCOUNTER (OUTPATIENT)
Age: 60
Discharge: HOME | End: 2025-08-04
Payer: COMMERCIAL

## 2025-08-04 DIAGNOSIS — S99.921A INJURY OF RIGHT FOOT, INITIAL ENCOUNTER: ICD-10-CM

## 2025-08-04 DIAGNOSIS — S46.211A STRAIN OF RIGHT BICEPS, INITIAL ENCOUNTER: ICD-10-CM

## 2025-08-04 PROCEDURE — 73630 X-RAY EXAM OF FOOT: CPT

## 2025-08-04 PROCEDURE — 73060 X-RAY EXAM OF HUMERUS: CPT

## 2025-08-11 ENCOUNTER — HOSPITAL ENCOUNTER (OUTPATIENT)
Dept: MAMMOGRAPHY | Age: 60
Discharge: HOME | End: 2025-08-11
Payer: COMMERCIAL

## 2025-08-11 DIAGNOSIS — Z12.39 BREAST SCREENING: ICD-10-CM

## 2025-08-11 PROCEDURE — 77063 BREAST TOMOSYNTHESIS BI: CPT

## 2025-09-01 ENCOUNTER — APPOINTMENT (OUTPATIENT)
Dept: GENERAL RADIOLOGY | Age: 60
End: 2025-09-01
Payer: COMMERCIAL

## 2025-09-01 ENCOUNTER — HOSPITAL ENCOUNTER (EMERGENCY)
Age: 60
Discharge: HOME | End: 2025-09-01
Attending: FAMILY MEDICINE
Payer: COMMERCIAL

## 2025-09-01 VITALS
BODY MASS INDEX: 43.41 KG/M2 | HEIGHT: 63 IN | DIASTOLIC BLOOD PRESSURE: 62 MMHG | RESPIRATION RATE: 14 BRPM | HEART RATE: 99 BPM | WEIGHT: 245 LBS | OXYGEN SATURATION: 99 % | TEMPERATURE: 97 F | SYSTOLIC BLOOD PRESSURE: 159 MMHG

## 2025-09-01 DIAGNOSIS — M77.31 HEEL SPUR, RIGHT: ICD-10-CM

## 2025-09-01 DIAGNOSIS — S93.401A MODERATE RIGHT ANKLE SPRAIN, INITIAL ENCOUNTER: Primary | ICD-10-CM

## 2025-09-01 PROCEDURE — 99283 EMERGENCY DEPT VISIT LOW MDM: CPT

## 2025-09-01 PROCEDURE — 73610 X-RAY EXAM OF ANKLE: CPT

## 2025-09-01 ASSESSMENT — PAIN SCALES - GENERAL
PAINLEVEL_OUTOF10: 6
PAINLEVEL_OUTOF10: 4

## 2025-09-01 ASSESSMENT — PAIN - FUNCTIONAL ASSESSMENT
PAIN_FUNCTIONAL_ASSESSMENT: 0-10
PAIN_FUNCTIONAL_ASSESSMENT: 0-10

## 2025-09-01 ASSESSMENT — ENCOUNTER SYMPTOMS
NAUSEA: 0
VOMITING: 0

## 2025-09-01 ASSESSMENT — PAIN DESCRIPTION - LOCATION
LOCATION: ANKLE
LOCATION: ANKLE

## 2025-09-01 ASSESSMENT — LIFESTYLE VARIABLES: HOW OFTEN DO YOU HAVE A DRINK CONTAINING ALCOHOL: NEVER

## 2025-09-01 ASSESSMENT — PAIN DESCRIPTION - ORIENTATION
ORIENTATION: RIGHT
ORIENTATION: RIGHT

## (undated) DEVICE — NEEDLE SYR 18GA L1.5IN RED PLAS HUB S STL BLNT FILL W/O

## (undated) DEVICE — NEEDLE SPNL 22GA L3.5IN BLK HUB S STL REG WALL FIT STYL W/

## (undated) DEVICE — 6 ML SYRINGE LUER-LOCK TIP: Brand: MONOJECT

## (undated) DEVICE — 3 ML SYRINGE LUER-LOCK TIP: Brand: MONOJECT

## (undated) DEVICE — HYPODERMIC SAFETY NEEDLE: Brand: MAGELLAN

## (undated) DEVICE — GLOVE ORANGE PI 7 1/2   MSG9075

## (undated) DEVICE — CHLORAPREP 26ML CLEAR

## (undated) DEVICE — GAUZE SPONGES,USP TYPE VII GAUZE, 12 PLY: Brand: CURITY

## (undated) DEVICE — SHEET,DRAPE,3/4,53X77,STERILE: Brand: MEDLINE